# Patient Record
Sex: FEMALE | Race: WHITE | Employment: OTHER | URBAN - METROPOLITAN AREA
[De-identification: names, ages, dates, MRNs, and addresses within clinical notes are randomized per-mention and may not be internally consistent; named-entity substitution may affect disease eponyms.]

---

## 2017-01-24 ENCOUNTER — OP HISTORICAL/CONVERTED ENCOUNTER (OUTPATIENT)
Dept: OTHER | Age: 62
End: 2017-01-24

## 2017-01-31 ENCOUNTER — OP HISTORICAL/CONVERTED ENCOUNTER (OUTPATIENT)
Dept: OTHER | Age: 62
End: 2017-01-31

## 2017-02-22 ENCOUNTER — ED HISTORICAL/CONVERTED ENCOUNTER (OUTPATIENT)
Dept: OTHER | Age: 62
End: 2017-02-22

## 2017-06-26 ENCOUNTER — OP HISTORICAL/CONVERTED ENCOUNTER (OUTPATIENT)
Dept: OTHER | Age: 62
End: 2017-06-26

## 2017-08-18 ENCOUNTER — OP HISTORICAL/CONVERTED ENCOUNTER (OUTPATIENT)
Dept: OTHER | Age: 62
End: 2017-08-18

## 2017-09-14 ENCOUNTER — OP HISTORICAL/CONVERTED ENCOUNTER (OUTPATIENT)
Dept: OTHER | Age: 62
End: 2017-09-14

## 2017-10-04 ENCOUNTER — OP HISTORICAL/CONVERTED ENCOUNTER (OUTPATIENT)
Dept: OTHER | Age: 62
End: 2017-10-04

## 2017-10-20 ENCOUNTER — OP HISTORICAL/CONVERTED ENCOUNTER (OUTPATIENT)
Dept: OTHER | Age: 62
End: 2017-10-20

## 2017-12-15 ENCOUNTER — OP HISTORICAL/CONVERTED ENCOUNTER (OUTPATIENT)
Dept: OTHER | Age: 62
End: 2017-12-15

## 2017-12-21 ENCOUNTER — OP HISTORICAL/CONVERTED ENCOUNTER (OUTPATIENT)
Dept: OTHER | Age: 62
End: 2017-12-21

## 2018-04-12 ENCOUNTER — OP HISTORICAL/CONVERTED ENCOUNTER (OUTPATIENT)
Dept: OTHER | Age: 63
End: 2018-04-12

## 2018-04-23 ENCOUNTER — OP HISTORICAL/CONVERTED ENCOUNTER (OUTPATIENT)
Dept: OTHER | Age: 63
End: 2018-04-23

## 2018-08-12 ENCOUNTER — ED HISTORICAL/CONVERTED ENCOUNTER (OUTPATIENT)
Dept: OTHER | Age: 63
End: 2018-08-12

## 2018-11-01 ENCOUNTER — OP HISTORICAL/CONVERTED ENCOUNTER (OUTPATIENT)
Dept: OTHER | Age: 63
End: 2018-11-01

## 2018-11-12 ENCOUNTER — IP HISTORICAL/CONVERTED ENCOUNTER (OUTPATIENT)
Dept: OTHER | Age: 63
End: 2018-11-12

## 2018-12-11 ENCOUNTER — ED HISTORICAL/CONVERTED ENCOUNTER (OUTPATIENT)
Dept: OTHER | Age: 63
End: 2018-12-11

## 2018-12-14 ENCOUNTER — ED HISTORICAL/CONVERTED ENCOUNTER (OUTPATIENT)
Dept: OTHER | Age: 63
End: 2018-12-14

## 2019-02-14 ENCOUNTER — OP HISTORICAL/CONVERTED ENCOUNTER (OUTPATIENT)
Dept: OTHER | Age: 64
End: 2019-02-14

## 2019-04-24 ENCOUNTER — OP HISTORICAL/CONVERTED ENCOUNTER (OUTPATIENT)
Dept: OTHER | Age: 64
End: 2019-04-24

## 2019-05-02 ENCOUNTER — OP HISTORICAL/CONVERTED ENCOUNTER (OUTPATIENT)
Dept: OTHER | Age: 64
End: 2019-05-02

## 2019-05-06 ENCOUNTER — OP HISTORICAL/CONVERTED ENCOUNTER (OUTPATIENT)
Dept: OTHER | Age: 64
End: 2019-05-06

## 2019-05-14 ENCOUNTER — OP HISTORICAL/CONVERTED ENCOUNTER (OUTPATIENT)
Dept: OTHER | Age: 64
End: 2019-05-14

## 2019-05-19 ENCOUNTER — OP HISTORICAL/CONVERTED ENCOUNTER (OUTPATIENT)
Dept: OTHER | Age: 64
End: 2019-05-19

## 2019-10-23 ENCOUNTER — OP HISTORICAL/CONVERTED ENCOUNTER (OUTPATIENT)
Dept: OTHER | Age: 64
End: 2019-10-23

## 2019-11-15 ENCOUNTER — OP HISTORICAL/CONVERTED ENCOUNTER (OUTPATIENT)
Dept: OTHER | Age: 64
End: 2019-11-15

## 2020-05-28 ENCOUNTER — OP HISTORICAL/CONVERTED ENCOUNTER (OUTPATIENT)
Dept: OTHER | Age: 65
End: 2020-05-28

## 2020-10-19 ENCOUNTER — HOSPITAL ENCOUNTER (OUTPATIENT)
Dept: NON INVASIVE DIAGNOSTICS | Age: 65
Discharge: HOME | End: 2020-10-19
Attending: INTERNAL MEDICINE
Payer: MEDICARE

## 2020-10-19 ENCOUNTER — TRANSCRIBE ORDER (OUTPATIENT)
Dept: SCHEDULING | Age: 65
End: 2020-10-19

## 2020-10-19 DIAGNOSIS — M79.605 LEFT LEG PAIN: Primary | ICD-10-CM

## 2020-10-19 DIAGNOSIS — M79.605 LEFT LEG PAIN: ICD-10-CM

## 2020-10-19 DIAGNOSIS — M79.604 BILATERAL LEG PAIN: ICD-10-CM

## 2020-10-19 DIAGNOSIS — M79.605 BILATERAL LEG PAIN: ICD-10-CM

## 2020-10-19 PROCEDURE — 93970 EXTREMITY STUDY: CPT

## 2020-10-21 ENCOUNTER — TRANSCRIBE ORDER (OUTPATIENT)
Dept: SCHEDULING | Age: 65
End: 2020-10-21

## 2020-10-21 DIAGNOSIS — R06.02 SHORTNESS OF BREATH: Primary | ICD-10-CM

## 2020-10-21 DIAGNOSIS — R53.83 FATIGUE: ICD-10-CM

## 2020-10-21 DIAGNOSIS — D05.11 INTRADUCTAL CARCINOMA IN SITU OF RIGHT BREAST: ICD-10-CM

## 2020-10-29 ENCOUNTER — HOSPITAL ENCOUNTER (OUTPATIENT)
Dept: CT IMAGING | Age: 65
Discharge: HOME | End: 2020-10-29
Attending: INTERNAL MEDICINE
Payer: MEDICARE

## 2020-10-29 DIAGNOSIS — R53.83 FATIGUE: ICD-10-CM

## 2020-10-29 DIAGNOSIS — D05.11 INTRADUCTAL CARCINOMA IN SITU OF RIGHT BREAST: ICD-10-CM

## 2020-10-29 DIAGNOSIS — R06.02 SHORTNESS OF BREATH: ICD-10-CM

## 2020-10-29 PROCEDURE — 74011000636 HC RX REV CODE- 636: Performed by: INTERNAL MEDICINE

## 2020-10-29 PROCEDURE — 71260 CT THORAX DX C+: CPT

## 2020-10-29 RX ADMIN — IOPAMIDOL 100 ML: 755 INJECTION, SOLUTION INTRAVENOUS at 11:45

## 2021-01-06 ENCOUNTER — TRANSCRIBE ORDER (OUTPATIENT)
Dept: SCHEDULING | Age: 66
End: 2021-01-06

## 2021-01-06 DIAGNOSIS — M79.89 LEFT LEG SWELLING: Primary | ICD-10-CM

## 2021-01-08 ENCOUNTER — HOSPITAL ENCOUNTER (OUTPATIENT)
Dept: NON INVASIVE DIAGNOSTICS | Age: 66
Discharge: HOME | End: 2021-01-08
Attending: FAMILY MEDICINE
Payer: MEDICARE

## 2021-01-08 DIAGNOSIS — M79.89 LEFT LEG SWELLING: ICD-10-CM

## 2021-01-08 PROCEDURE — 93971 EXTREMITY STUDY: CPT

## 2021-01-19 ENCOUNTER — HOSPITAL ENCOUNTER (OUTPATIENT)
Dept: GENERAL RADIOLOGY | Age: 66
Discharge: HOME | End: 2021-01-19
Payer: MEDICARE

## 2021-01-19 ENCOUNTER — TRANSCRIBE ORDER (OUTPATIENT)
Dept: REGISTRATION | Age: 66
End: 2021-01-19

## 2021-01-19 DIAGNOSIS — M25.579 ANKLE PAIN: ICD-10-CM

## 2021-01-19 DIAGNOSIS — M79.673 FOOT PAIN: Primary | ICD-10-CM

## 2021-01-19 DIAGNOSIS — M79.673 FOOT PAIN: ICD-10-CM

## 2021-01-19 PROCEDURE — 73610 X-RAY EXAM OF ANKLE: CPT

## 2021-01-19 PROCEDURE — 73630 X-RAY EXAM OF FOOT: CPT

## 2021-03-03 ENCOUNTER — OFFICE VISIT (OUTPATIENT)
Dept: PRIMARY CARE CLINIC | Age: 66
End: 2021-03-03
Payer: MEDICARE

## 2021-03-03 VITALS
SYSTOLIC BLOOD PRESSURE: 118 MMHG | DIASTOLIC BLOOD PRESSURE: 72 MMHG | RESPIRATION RATE: 20 BRPM | HEART RATE: 65 BPM | TEMPERATURE: 97.8 F | OXYGEN SATURATION: 96 %

## 2021-03-03 DIAGNOSIS — R68.83 CHILLS: ICD-10-CM

## 2021-03-03 DIAGNOSIS — Z13.83 SCREENING FOR CARDIOVASCULAR, RESPIRATORY, AND GENITOURINARY DISEASES: Primary | ICD-10-CM

## 2021-03-03 DIAGNOSIS — Z13.89 SCREENING FOR CARDIOVASCULAR, RESPIRATORY, AND GENITOURINARY DISEASES: Primary | ICD-10-CM

## 2021-03-03 DIAGNOSIS — J34.89 FRONTAL SINUS PAIN: ICD-10-CM

## 2021-03-03 DIAGNOSIS — R53.83 FATIGUE, UNSPECIFIED TYPE: ICD-10-CM

## 2021-03-03 DIAGNOSIS — R52 GENERALIZED BODY ACHES: ICD-10-CM

## 2021-03-03 DIAGNOSIS — R11.0 NAUSEA: ICD-10-CM

## 2021-03-03 DIAGNOSIS — R19.7 DIARRHEA, UNSPECIFIED TYPE: ICD-10-CM

## 2021-03-03 DIAGNOSIS — Z13.6 SCREENING FOR CARDIOVASCULAR, RESPIRATORY, AND GENITOURINARY DISEASES: Primary | ICD-10-CM

## 2021-03-03 PROCEDURE — 99213 OFFICE O/P EST LOW 20 MIN: CPT | Performed by: NURSE PRACTITIONER

## 2021-03-03 RX ORDER — FLUTICASONE PROPIONATE 50 MCG
SPRAY, SUSPENSION (ML) NASAL
Qty: 1 BOTTLE | Refills: 1 | Status: SHIPPED | OUTPATIENT
Start: 2021-03-03 | End: 2021-09-22

## 2021-03-03 RX ORDER — ONDANSETRON 8 MG/1
8 TABLET, ORALLY DISINTEGRATING ORAL
Qty: 21 TAB | Refills: 0 | Status: ON HOLD | OUTPATIENT
Start: 2021-03-03 | End: 2021-09-19

## 2021-03-03 RX ORDER — GUAIFENESIN 600 MG/1
600 TABLET, EXTENDED RELEASE ORAL 2 TIMES DAILY
Qty: 30 TAB | Refills: 0 | Status: SHIPPED | OUTPATIENT
Start: 2021-03-03 | End: 2022-08-31

## 2021-03-03 RX ORDER — SOD CHLOR,BICARB/SQUEEZ BOTTLE
1 PACKET, WITH RINSE DEVICE NASAL 2 TIMES DAILY
Qty: 1 EACH | Refills: 1 | Status: ON HOLD | OUTPATIENT
Start: 2021-03-03 | End: 2021-09-19

## 2021-03-03 RX ORDER — SODIUM SULFIDE 10 MG/G
15 GEL TOPICAL
Qty: 240 ML | Refills: 0 | Status: ON HOLD | OUTPATIENT
Start: 2021-03-03 | End: 2021-09-19

## 2021-03-03 NOTE — PROGRESS NOTES
Chief Complaint   Patient presents with    Chills     Patient sent by PCP's office, has c/o symptoms x 4 days.      Generalized Body Aches    Diarrhea    Headache    Fatigue    Nausea     Visit Vitals  /72 (BP 1 Location: Left upper arm, BP Patient Position: Sitting, BP Cuff Size: Adult)   Pulse 65   Temp 97.8 °F (36.6 °C) (Skin)   Resp 20   SpO2 96% Comment: 2L oxygen

## 2021-03-03 NOTE — PROGRESS NOTES
Radha Campa is a 63 y.o. female who was seen in clinic today (3/3/2021) for an acute visit. Assessment/Plan:            * COVID-19 sample collected and submitted       * Patient given detailed CDC instructions contained within After Visit Summary    Diagnoses and all orders for this visit:    1. Screening for cardiovascular, respiratory, and genitourinary diseases  -     NOVEL CORONAVIRUS (COVID-19)    2. Frontal sinus pain  -     guaiFENesin ER (MUCINEX) 600 mg ER tablet; Take 1 Tab by mouth two (2) times a day. Indications: cold symptoms, cough  -     sod chlor-bicarb-squeez bottle (Neilmed Sinus Rinse Complete) pkdv; 1 Kit by sinus irrigation route two (2) times a day. Indications: stuffy nose  -     fluticasone propionate (FLONASE) 50 mcg/actuation nasal spray; 1-2 sprays each nostril daily; place tip of nozzle in nose, aim towards same side sinus/eye, sniff gently and depress quickly, do not swallow. Indications: inflammation of the nose due to an allergy    3. Chills  -     NOVEL CORONAVIRUS (COVID-19)    4. Generalized body aches  -     NOVEL CORONAVIRUS (COVID-19)    5. Fatigue, unspecified type  -     NOVEL CORONAVIRUS (COVID-19)  -     doxylamine-DM-acetaminophen (Coricidin HBP Cold-Multi Sympt) 6.25- mg/15 mL liqd; Take 15 mL by mouth every six (6) hours as needed for Pain, Fever or Cough. Indications: cold symptoms, cough, flu-like symptoms    6. Nausea  -     NOVEL CORONAVIRUS (COVID-19)  -     ondansetron (ZOFRAN ODT) 8 mg disintegrating tablet; Take 1 Tab by mouth every eight (8) hours as needed for Nausea or Vomiting. 7. Diarrhea, unspecified type  -     NOVEL CORONAVIRUS (COVID-19)       Covid like s/s with no known covid exposure. Discussed expected course/resolution/complications of diagnosis in detail with patient. Advised pt on CDC guidance, OTC medications for symptom management, red flags reviewed and should develop to seek emergency medical attn.    Reviewed with her that COVID-19 pandemic is an evolving situation with rapidly changing recommendations & guidelines, continue to practice hand hygiene, social distancing, wearing of facial coverings. Regardless of testing results, should still follow CDC guidelines as there is a chance of a false negative, or symptoms may be due to a cold or flu which are also transmissible. Medical decisions are made based on the the best information available at the time. Recommended she stay tuned for updates published by trusted sources and to advise your PCP of any unexpected changes in clinical condition     Subjective:   Nelson Pope was seen today for Chills (Patient sent by PCP's office, has c/o symptoms x 4 days. ), Generalized Body Aches, Diarrhea, Headache, Fatigue, and Nausea  Symptoms are not worsening since onset. She has no known covid 19 exposure. Has a hx of COPD and uses 2L of 02 via nasal cannula, symptoms are at baseline, she denies increased sputum production/increased sputum thickness. Hasn't attempted to treat symptoms with any OTC med. She denies a recent history/current: loss of smell/taste, cough, fever, wheezing, SOB, and HERNANDEZ. Travel Screening     Question   Response    In the last month, have you been in contact with someone who was confirmed or suspected to have Coronavirus / COVID-19? No / Unsure    Have you had a COVID-19 viral test in the last 14 days? No    Do you have any of the following new or worsening symptoms? Chills;Diarrhea;Muscle pain; Fatigue    Have you traveled internationally in the last month? No      Travel History   Travel since 02/03/21     No documented travel since 02/03/21          ROS - Pertinent items are noted in HPI    There is no problem list on file for this patient. Home Medications    Medication Sig Start Date End Date Taking? Authorizing Provider   rivaroxaban (XARELTO PO) Take  by mouth.    Yes Provider, Historical   doxylamine-DM-acetaminophen (Coricidin HBP Cold-Multi Sympt) 6.25- mg/15 mL liqd Take 15 mL by mouth every six (6) hours as needed for Pain, Fever or Cough. Indications: cold symptoms, cough, flu-like symptoms 3/3/21  Yes Lashell Minor, NP   guaiFENesin ER (MUCINEX) 600 mg ER tablet Take 1 Tab by mouth two (2) times a day. Indications: cold symptoms, cough 3/3/21  Yes Lashell Minor, NP   ondansetron (ZOFRAN ODT) 8 mg disintegrating tablet Take 1 Tab by mouth every eight (8) hours as needed for Nausea or Vomiting. 3/3/21  Yes Lashell Minor, NP   sod chlor-bicarb-squeez bottle (Neilmed Sinus Rinse Complete) pkdv 1 Kit by sinus irrigation route two (2) times a day. Indications: stuffy nose 3/3/21  Yes Lashell Minor, NP   fluticasone propionate (FLONASE) 50 mcg/actuation nasal spray 1-2 sprays each nostril daily; place tip of nozzle in nose, aim towards same side sinus/eye, sniff gently and depress quickly, do not swallow. Indications: inflammation of the nose due to an allergy 3/3/21  Yes Lashell Minor, NP      No Known Allergies       Objective:   Physical Exam  General:  alert, cooperative, no distress, wearing nasal cannula under procedural face mask   Ears: Normal external ear canals AU   Sinuses: Frontal and maxillary TTP   Mouth:  Lips, mucosa, and tongue normal. Teeth and gums normal: oropharynx: clear   Neck: supple, symmetrical, trachea midline. Heart: normal rate, regular rhythm, normal S1, S2, no murmurs, rubs, clicks or gallops. Lungs: clear to auscultation bilaterally       Visit Vitals  /72 (BP 1 Location: Left upper arm, BP Patient Position: Sitting, BP Cuff Size: Adult)   Pulse 65   Temp 97.8 °F (36.6 °C) (Skin)   Resp 20   SpO2 96% Comment: 2L oxygen         Disclaimer:        Medication risks/benefits/costs/interactions/alternatives discussed with patient. She was given an after visit summary which includes diagnoses, current medications, & vitals. She expressed understanding with the diagnosis and plan.       Aspects of this note may have been generated using voice recognition software. Despite editing, there may be some syntax errors.        Verito Baldwin NP

## 2021-03-04 ENCOUNTER — TELEPHONE (OUTPATIENT)
Dept: PRIMARY CARE CLINIC | Age: 66
End: 2021-03-04

## 2021-03-04 LAB — SARS-COV-2, NAA: NOT DETECTED

## 2021-05-20 ENCOUNTER — TRANSCRIBE ORDER (OUTPATIENT)
Dept: SCHEDULING | Age: 66
End: 2021-05-20

## 2021-05-20 DIAGNOSIS — D05.11 INTRADUCTAL CARCINOMA IN SITU OF RIGHT BREAST: Primary | ICD-10-CM

## 2021-06-01 ENCOUNTER — HOSPITAL ENCOUNTER (OUTPATIENT)
Dept: MAMMOGRAPHY | Age: 66
Discharge: HOME | End: 2021-06-01
Attending: INTERNAL MEDICINE
Payer: MEDICARE

## 2021-06-01 DIAGNOSIS — D05.11 INTRADUCTAL CARCINOMA IN SITU OF RIGHT BREAST: ICD-10-CM

## 2021-06-01 PROCEDURE — 77062 BREAST TOMOSYNTHESIS BI: CPT

## 2021-06-16 ENCOUNTER — HOSPITAL ENCOUNTER (OUTPATIENT)
Dept: GENERAL RADIOLOGY | Age: 66
Discharge: HOME | End: 2021-06-16
Payer: MEDICARE

## 2021-06-16 ENCOUNTER — TRANSCRIBE ORDER (OUTPATIENT)
Dept: REGISTRATION | Age: 66
End: 2021-06-16

## 2021-06-16 DIAGNOSIS — J44.9 COPD (CHRONIC OBSTRUCTIVE PULMONARY DISEASE) (HCC): ICD-10-CM

## 2021-06-16 DIAGNOSIS — J44.9 COPD (CHRONIC OBSTRUCTIVE PULMONARY DISEASE) (HCC): Primary | ICD-10-CM

## 2021-06-16 PROCEDURE — 71046 X-RAY EXAM CHEST 2 VIEWS: CPT

## 2021-09-18 ENCOUNTER — HOSPITAL ENCOUNTER (INPATIENT)
Age: 66
LOS: 2 days | Discharge: HOME | DRG: 813 | End: 2021-09-22
Attending: EMERGENCY MEDICINE | Admitting: HOSPITALIST
Payer: MEDICARE

## 2021-09-18 DIAGNOSIS — R04.0 NOSEBLEED: Primary | ICD-10-CM

## 2021-09-18 DIAGNOSIS — Z79.01 ANTICOAGULATED BY ANTICOAGULATION TREATMENT: ICD-10-CM

## 2021-09-18 LAB
ANION GAP SERPL CALC-SCNC: 12 MMOL/L (ref 5–15)
APTT PPP: 28.2 SEC (ref 22.1–31)
BASOPHILS # BLD: 0.1 K/UL (ref 0–0.1)
BASOPHILS NFR BLD: 1 % (ref 0–1)
BUN SERPL-MCNC: 33 MG/DL (ref 6–20)
BUN/CREAT SERPL: 24 (ref 12–20)
CA-I BLD-MCNC: 9.4 MG/DL (ref 8.5–10.1)
CHLORIDE SERPL-SCNC: 100 MMOL/L (ref 97–108)
CO2 SERPL-SCNC: 28 MMOL/L (ref 21–32)
CREAT SERPL-MCNC: 1.38 MG/DL (ref 0.55–1.02)
DIFFERENTIAL METHOD BLD: NORMAL
EOSINOPHIL # BLD: 0.2 K/UL (ref 0–0.4)
EOSINOPHIL NFR BLD: 2 % (ref 0–7)
ERYTHROCYTE [DISTWIDTH] IN BLOOD BY AUTOMATED COUNT: 14.1 % (ref 11.5–14.5)
GLUCOSE SERPL-MCNC: 105 MG/DL (ref 65–100)
HCT VFR BLD AUTO: 40.6 % (ref 35–47)
HGB BLD-MCNC: 13.3 G/DL (ref 11.5–16)
IMM GRANULOCYTES # BLD AUTO: 0 K/UL (ref 0–0.04)
IMM GRANULOCYTES NFR BLD AUTO: 0 % (ref 0–0.5)
INR PPP: 1.2 (ref 0.9–1.1)
LYMPHOCYTES # BLD: 2.8 K/UL (ref 0.8–3.5)
LYMPHOCYTES NFR BLD: 30 % (ref 12–49)
MCH RBC QN AUTO: 30.9 PG (ref 26–34)
MCHC RBC AUTO-ENTMCNC: 32.8 G/DL (ref 30–36.5)
MCV RBC AUTO: 94.2 FL (ref 80–99)
MONOCYTES # BLD: 0.9 K/UL (ref 0–1)
MONOCYTES NFR BLD: 9 % (ref 5–13)
NEUTS SEG # BLD: 5.3 K/UL (ref 1.8–8)
NEUTS SEG NFR BLD: 58 % (ref 32–75)
PLATELET # BLD AUTO: 169 K/UL (ref 150–400)
PMV BLD AUTO: 12.2 FL (ref 8.9–12.9)
POTASSIUM SERPL-SCNC: 3.7 MMOL/L (ref 3.5–5.1)
PROTHROMBIN TIME: 12.3 SEC (ref 9–11.1)
RBC # BLD AUTO: 4.31 M/UL (ref 3.8–5.2)
SODIUM SERPL-SCNC: 140 MMOL/L (ref 136–145)
THERAPEUTIC RANGE,PTTT: NORMAL SEC (ref 82–109)
WBC # BLD AUTO: 9.1 K/UL (ref 3.6–11)

## 2021-09-18 PROCEDURE — 36415 COLL VENOUS BLD VENIPUNCTURE: CPT

## 2021-09-18 PROCEDURE — 86901 BLOOD TYPING SEROLOGIC RH(D): CPT

## 2021-09-18 PROCEDURE — 80048 BASIC METABOLIC PNL TOTAL CA: CPT

## 2021-09-18 PROCEDURE — 85610 PROTHROMBIN TIME: CPT

## 2021-09-18 PROCEDURE — 85025 COMPLETE CBC W/AUTO DIFF WBC: CPT

## 2021-09-18 PROCEDURE — 85730 THROMBOPLASTIN TIME PARTIAL: CPT

## 2021-09-18 PROCEDURE — 2Y41X5Z PACKING OF NASAL REGION USING PACKING MATERIAL: ICD-10-PCS | Performed by: HOSPITALIST

## 2021-09-18 PROCEDURE — 99284 EMERGENCY DEPT VISIT MOD MDM: CPT

## 2021-09-18 RX ORDER — ATORVASTATIN CALCIUM 40 MG/1
40 TABLET, FILM COATED ORAL DAILY
COMMUNITY
Start: 2021-08-20 | End: 2022-08-31

## 2021-09-18 RX ORDER — ASPIRIN 81 MG/1
81 TABLET ORAL DAILY
COMMUNITY

## 2021-09-18 NOTE — Clinical Note
Patient Class[de-identified] OBSERVATION [104]   Type of Bed: Medical [8]   Cardiac Monitoring Required?: No   Reason for Observation: posterior nose bleed   Admitting Diagnosis: Bleeding from the nose [[id number]]   Admitting Diagnosis: Anticoagulated by anticoagulation treatment [[id number]]   Admitting Physician: Ludwin Griffin [[id number]   Attending Physician: Ludwin Griffin [[id number]]

## 2021-09-19 LAB
ABO + RH BLD: NORMAL
ANION GAP SERPL CALC-SCNC: 4 MMOL/L (ref 5–15)
BASOPHILS # BLD: 0 K/UL (ref 0–0.1)
BASOPHILS NFR BLD: 1 % (ref 0–1)
BLOOD GROUP ANTIBODIES SERPL: NEGATIVE
BUN SERPL-MCNC: 30 MG/DL (ref 6–20)
BUN/CREAT SERPL: 35 (ref 12–20)
CA-I BLD-MCNC: 9.1 MG/DL (ref 8.5–10.1)
CHLORIDE SERPL-SCNC: 105 MMOL/L (ref 97–108)
CO2 SERPL-SCNC: 32 MMOL/L (ref 21–32)
CREAT SERPL-MCNC: 0.85 MG/DL (ref 0.55–1.02)
DIFFERENTIAL METHOD BLD: ABNORMAL
EOSINOPHIL # BLD: 0.1 K/UL (ref 0–0.4)
EOSINOPHIL NFR BLD: 2 % (ref 0–7)
ERYTHROCYTE [DISTWIDTH] IN BLOOD BY AUTOMATED COUNT: 14.2 % (ref 11.5–14.5)
GLUCOSE SERPL-MCNC: 120 MG/DL (ref 65–100)
HCT VFR BLD AUTO: 36.8 % (ref 35–47)
HGB BLD-MCNC: 11.9 G/DL (ref 11.5–16)
IMM GRANULOCYTES # BLD AUTO: 0 K/UL (ref 0–0.04)
IMM GRANULOCYTES NFR BLD AUTO: 0 % (ref 0–0.5)
LYMPHOCYTES # BLD: 1.7 K/UL (ref 0.8–3.5)
LYMPHOCYTES NFR BLD: 27 % (ref 12–49)
MCH RBC QN AUTO: 30.4 PG (ref 26–34)
MCHC RBC AUTO-ENTMCNC: 32.3 G/DL (ref 30–36.5)
MCV RBC AUTO: 94.1 FL (ref 80–99)
MONOCYTES # BLD: 0.7 K/UL (ref 0–1)
MONOCYTES NFR BLD: 11 % (ref 5–13)
NEUTS SEG # BLD: 3.9 K/UL (ref 1.8–8)
NEUTS SEG NFR BLD: 59 % (ref 32–75)
NRBC # BLD: 0 K/UL (ref 0–0.01)
NRBC BLD-RTO: 0 PER 100 WBC
PLATELET # BLD AUTO: 142 K/UL (ref 150–400)
PMV BLD AUTO: 13 FL (ref 8.9–12.9)
POTASSIUM SERPL-SCNC: 3.9 MMOL/L (ref 3.5–5.1)
RBC # BLD AUTO: 3.91 M/UL (ref 3.8–5.2)
SODIUM SERPL-SCNC: 141 MMOL/L (ref 136–145)
SPECIMEN EXP DATE BLD: NORMAL
WBC # BLD AUTO: 6.6 K/UL (ref 3.6–11)

## 2021-09-19 PROCEDURE — 99218 HC RM OBSERVATION: CPT

## 2021-09-19 PROCEDURE — 85025 COMPLETE CBC W/AUTO DIFF WBC: CPT

## 2021-09-19 PROCEDURE — 74011250637 HC RX REV CODE- 250/637: Performed by: HOSPITALIST

## 2021-09-19 PROCEDURE — 74011250637 HC RX REV CODE- 250/637: Performed by: PHYSICIAN ASSISTANT

## 2021-09-19 PROCEDURE — 99222 1ST HOSP IP/OBS MODERATE 55: CPT | Performed by: OTOLARYNGOLOGY

## 2021-09-19 PROCEDURE — 80048 BASIC METABOLIC PNL TOTAL CA: CPT

## 2021-09-19 PROCEDURE — 36415 COLL VENOUS BLD VENIPUNCTURE: CPT

## 2021-09-19 PROCEDURE — 77010033678 HC OXYGEN DAILY

## 2021-09-19 RX ORDER — ESCITALOPRAM OXALATE 10 MG/1
10 TABLET ORAL EVERY EVENING
Status: DISCONTINUED | OUTPATIENT
Start: 2021-09-19 | End: 2021-09-19

## 2021-09-19 RX ORDER — ONDANSETRON 4 MG/1
4 TABLET, ORALLY DISINTEGRATING ORAL
Status: DISCONTINUED | OUTPATIENT
Start: 2021-09-19 | End: 2021-09-22 | Stop reason: HOSPADM

## 2021-09-19 RX ORDER — LEVOTHYROXINE SODIUM 100 UG/1
75 TABLET ORAL
COMMUNITY
End: 2021-09-22

## 2021-09-19 RX ORDER — POTASSIUM CHLORIDE 750 MG/1
10 TABLET, FILM COATED, EXTENDED RELEASE ORAL DAILY
COMMUNITY

## 2021-09-19 RX ORDER — FLUTICASONE PROPIONATE 50 MCG
2 SPRAY, SUSPENSION (ML) NASAL DAILY
Status: DISCONTINUED | OUTPATIENT
Start: 2021-09-19 | End: 2021-09-19

## 2021-09-19 RX ORDER — LEVOTHYROXINE SODIUM 75 UG/1
75 TABLET ORAL
Status: DISCONTINUED | OUTPATIENT
Start: 2021-09-20 | End: 2021-09-19

## 2021-09-19 RX ORDER — ESCITALOPRAM OXALATE 10 MG/1
10 TABLET ORAL DAILY
Status: ON HOLD | COMMUNITY
End: 2021-09-19

## 2021-09-19 RX ORDER — MONTELUKAST SODIUM 10 MG/1
10 TABLET ORAL DAILY
Status: DISCONTINUED | OUTPATIENT
Start: 2021-09-19 | End: 2021-09-22 | Stop reason: HOSPADM

## 2021-09-19 RX ORDER — PRAMIPEXOLE DIHYDROCHLORIDE 0.12 MG/1
0.5 TABLET ORAL
Status: DISCONTINUED | OUTPATIENT
Start: 2021-09-19 | End: 2021-09-19

## 2021-09-19 RX ORDER — PRAMIPEXOLE DIHYDROCHLORIDE 0.25 MG/1
0.25 TABLET ORAL
COMMUNITY

## 2021-09-19 RX ORDER — ACETAMINOPHEN 650 MG/1
650 SUPPOSITORY RECTAL
Status: DISCONTINUED | OUTPATIENT
Start: 2021-09-19 | End: 2021-09-22 | Stop reason: HOSPADM

## 2021-09-19 RX ORDER — TRAZODONE HYDROCHLORIDE 50 MG/1
50 TABLET ORAL
COMMUNITY

## 2021-09-19 RX ORDER — SODIUM CHLORIDE 0.9 % (FLUSH) 0.9 %
5-40 SYRINGE (ML) INJECTION EVERY 8 HOURS
Status: DISCONTINUED | OUTPATIENT
Start: 2021-09-19 | End: 2021-09-19

## 2021-09-19 RX ORDER — LANOLIN ALCOHOL/MO/W.PET/CERES
1000 CREAM (GRAM) TOPICAL DAILY
Status: DISCONTINUED | OUTPATIENT
Start: 2021-09-19 | End: 2021-09-22 | Stop reason: HOSPADM

## 2021-09-19 RX ORDER — PANTOPRAZOLE SODIUM 20 MG/1
20 TABLET, DELAYED RELEASE ORAL
Status: DISCONTINUED | OUTPATIENT
Start: 2021-09-19 | End: 2021-09-22 | Stop reason: HOSPADM

## 2021-09-19 RX ORDER — LANOLIN ALCOHOL/MO/W.PET/CERES
5000 CREAM (GRAM) TOPICAL DAILY
COMMUNITY

## 2021-09-19 RX ORDER — SODIUM CHLORIDE 0.9 % (FLUSH) 0.9 %
5-40 SYRINGE (ML) INJECTION AS NEEDED
Status: DISCONTINUED | OUTPATIENT
Start: 2021-09-19 | End: 2021-09-19

## 2021-09-19 RX ORDER — ATORVASTATIN CALCIUM 40 MG/1
40 TABLET, FILM COATED ORAL DAILY
Status: DISCONTINUED | OUTPATIENT
Start: 2021-09-19 | End: 2021-09-22 | Stop reason: HOSPADM

## 2021-09-19 RX ORDER — PRAMIPEXOLE DIHYDROCHLORIDE 0.12 MG/1
0.5 TABLET ORAL
Status: DISCONTINUED | OUTPATIENT
Start: 2021-09-19 | End: 2021-09-22 | Stop reason: HOSPADM

## 2021-09-19 RX ORDER — POTASSIUM CHLORIDE 750 MG/1
10 TABLET, FILM COATED, EXTENDED RELEASE ORAL DAILY
Status: DISCONTINUED | OUTPATIENT
Start: 2021-09-19 | End: 2021-09-22 | Stop reason: HOSPADM

## 2021-09-19 RX ORDER — MELOXICAM 15 MG/1
15 TABLET ORAL DAILY
COMMUNITY
End: 2021-09-22

## 2021-09-19 RX ORDER — MINERAL OIL
180 ENEMA (ML) RECTAL DAILY
COMMUNITY

## 2021-09-19 RX ORDER — ALBUTEROL SULFATE 90 UG/1
1 AEROSOL, METERED RESPIRATORY (INHALATION)
Status: DISCONTINUED | OUTPATIENT
Start: 2021-09-19 | End: 2021-09-22 | Stop reason: HOSPADM

## 2021-09-19 RX ORDER — OXYCODONE HYDROCHLORIDE 5 MG/1
5 TABLET ORAL
Status: DISCONTINUED | OUTPATIENT
Start: 2021-09-19 | End: 2021-09-22 | Stop reason: HOSPADM

## 2021-09-19 RX ORDER — ALBUTEROL SULFATE 90 UG/1
2 AEROSOL, METERED RESPIRATORY (INHALATION)
COMMUNITY

## 2021-09-19 RX ORDER — FUROSEMIDE 40 MG/1
80 TABLET ORAL DAILY
Status: DISCONTINUED | OUTPATIENT
Start: 2021-09-19 | End: 2021-09-22 | Stop reason: HOSPADM

## 2021-09-19 RX ORDER — MONTELUKAST SODIUM 10 MG/1
10 TABLET ORAL DAILY
COMMUNITY

## 2021-09-19 RX ORDER — PHENOL/SODIUM PHENOLATE
20 AEROSOL, SPRAY (ML) MUCOUS MEMBRANE DAILY
COMMUNITY

## 2021-09-19 RX ORDER — LEVOTHYROXINE SODIUM 75 UG/1
75 TABLET ORAL
Status: DISCONTINUED | OUTPATIENT
Start: 2021-09-19 | End: 2021-09-22 | Stop reason: HOSPADM

## 2021-09-19 RX ORDER — PAROXETINE 30 MG/1
30 TABLET, FILM COATED ORAL
COMMUNITY
End: 2022-08-31

## 2021-09-19 RX ORDER — AZITHROMYCIN 500 MG/1
500 TABLET, FILM COATED ORAL DAILY
Status: DISCONTINUED | OUTPATIENT
Start: 2021-09-19 | End: 2021-09-22 | Stop reason: HOSPADM

## 2021-09-19 RX ORDER — TRAZODONE HYDROCHLORIDE 50 MG/1
50 TABLET ORAL
Status: DISCONTINUED | OUTPATIENT
Start: 2021-09-19 | End: 2021-09-22 | Stop reason: HOSPADM

## 2021-09-19 RX ORDER — MELATONIN
1000 DAILY
COMMUNITY

## 2021-09-19 RX ORDER — TRAZODONE HYDROCHLORIDE 50 MG/1
50 TABLET ORAL
Status: DISCONTINUED | OUTPATIENT
Start: 2021-09-19 | End: 2021-09-19

## 2021-09-19 RX ORDER — ONDANSETRON 2 MG/ML
4 INJECTION INTRAMUSCULAR; INTRAVENOUS
Status: DISCONTINUED | OUTPATIENT
Start: 2021-09-19 | End: 2021-09-22 | Stop reason: HOSPADM

## 2021-09-19 RX ORDER — MELATONIN
1000 DAILY
Status: DISCONTINUED | OUTPATIENT
Start: 2021-09-19 | End: 2021-09-22 | Stop reason: HOSPADM

## 2021-09-19 RX ORDER — GUAIFENESIN 600 MG/1
600 TABLET, EXTENDED RELEASE ORAL 2 TIMES DAILY
Status: DISCONTINUED | OUTPATIENT
Start: 2021-09-19 | End: 2021-09-22 | Stop reason: HOSPADM

## 2021-09-19 RX ORDER — POLYETHYLENE GLYCOL 3350 17 G/17G
17 POWDER, FOR SOLUTION ORAL DAILY PRN
Status: DISCONTINUED | OUTPATIENT
Start: 2021-09-19 | End: 2021-09-22 | Stop reason: HOSPADM

## 2021-09-19 RX ORDER — ACETAMINOPHEN 325 MG/1
650 TABLET ORAL
Status: DISCONTINUED | OUTPATIENT
Start: 2021-09-19 | End: 2021-09-22 | Stop reason: HOSPADM

## 2021-09-19 RX ORDER — FUROSEMIDE 80 MG/1
80 TABLET ORAL DAILY
COMMUNITY
End: 2022-09-05

## 2021-09-19 RX ORDER — ONDANSETRON 4 MG/1
8 TABLET, ORALLY DISINTEGRATING ORAL
Status: DISCONTINUED | OUTPATIENT
Start: 2021-09-19 | End: 2021-09-19

## 2021-09-19 RX ADMIN — PRAMIPEXOLE DIHYDROCHLORIDE 0.5 MG: 0.12 TABLET ORAL at 22:59

## 2021-09-19 RX ADMIN — Medication 5 ML: at 04:23

## 2021-09-19 RX ADMIN — ACETAMINOPHEN 650 MG: 325 TABLET ORAL at 04:27

## 2021-09-19 RX ADMIN — FUROSEMIDE 80 MG: 40 TABLET ORAL at 12:19

## 2021-09-19 RX ADMIN — MONTELUKAST 10 MG: 10 TABLET, FILM COATED ORAL at 12:19

## 2021-09-19 RX ADMIN — OXYCODONE 5 MG: 5 TABLET ORAL at 16:26

## 2021-09-19 RX ADMIN — TRAZODONE HYDROCHLORIDE 50 MG: 50 TABLET ORAL at 22:00

## 2021-09-19 RX ADMIN — LEVOTHYROXINE SODIUM 75 MCG: 0.07 TABLET ORAL at 09:15

## 2021-09-19 RX ADMIN — PRAMIPEXOLE DIHYDROCHLORIDE 0.5 MG: 0.12 TABLET ORAL at 04:20

## 2021-09-19 RX ADMIN — GUAIFENESIN 600 MG: 600 TABLET, EXTENDED RELEASE ORAL at 22:55

## 2021-09-19 RX ADMIN — GUAIFENESIN 600 MG: 600 TABLET, EXTENDED RELEASE ORAL at 09:15

## 2021-09-19 RX ADMIN — ATORVASTATIN CALCIUM 40 MG: 40 TABLET, FILM COATED ORAL at 09:15

## 2021-09-19 RX ADMIN — PANTOPRAZOLE SODIUM 20 MG: 20 TABLET, DELAYED RELEASE ORAL at 09:15

## 2021-09-19 RX ADMIN — CYANOCOBALAMIN TAB 1000 MCG 1000 MCG: 1000 TAB at 12:19

## 2021-09-19 RX ADMIN — OXYCODONE 5 MG: 5 TABLET ORAL at 22:55

## 2021-09-19 RX ADMIN — PAROXETINE HYDROCHLORIDE 30 MG: 20 TABLET, FILM COATED ORAL at 04:21

## 2021-09-19 RX ADMIN — POTASSIUM CHLORIDE 10 MEQ: 750 TABLET, FILM COATED, EXTENDED RELEASE ORAL at 12:19

## 2021-09-19 RX ADMIN — AZITHROMYCIN MONOHYDRATE 500 MG: 500 TABLET ORAL at 12:19

## 2021-09-19 RX ADMIN — OXYCODONE 5 MG: 5 TABLET ORAL at 12:19

## 2021-09-19 RX ADMIN — PAROXETINE HYDROCHLORIDE 30 MG: 20 TABLET, FILM COATED ORAL at 22:54

## 2021-09-19 RX ADMIN — TRAZODONE HYDROCHLORIDE 50 MG: 50 TABLET ORAL at 04:22

## 2021-09-19 RX ADMIN — Medication 1000 UNITS: at 12:19

## 2021-09-19 NOTE — ED PROVIDER NOTES
EMERGENCY DEPARTMENT HISTORY AND PHYSICAL EXAM      Date: 9/18/2021  Patient Name: Faisal Cheung    History of Presenting Illness     Chief Complaint   Patient presents with    Epistaxis       History Provided By: Patient and daughter    HPI: Faisal Cheung, 63 y.o. female   presents to the ED with cc of nosebleed. Patient who is currently on aspirin and Xarelto came to emergency room complaining of nosebleed from left nare for 2 hours. The bleeding has been episodic without obvious alleviating or aggravating factors. No injury. Patient denies vomiting blood, bright red blood per rectum, hematuria, or easy bruisability. PCP: Dionne Auguste MD    No current facility-administered medications on file prior to encounter. Current Outpatient Medications on File Prior to Encounter   Medication Sig Dispense Refill    furosemide (Lasix) 80 mg tablet Take 80 mg by mouth daily.  escitalopram oxalate (Lexapro) 10 mg tablet Take 10 mg by mouth daily.  PARoxetine (PaxiL) 30 mg tablet Take 30 mg by mouth nightly.  pramipexole (Mirapex) 0.5 mg tablet Take 0.5 mg by mouth nightly.  traZODone (DESYREL) 50 mg tablet Take 50 mg by mouth nightly.  atorvastatin (LIPITOR) 40 mg tablet Take 40 mg by mouth daily.  aspirin delayed-release 81 mg tablet Take 81 mg by mouth daily.  rivaroxaban (XARELTO PO) Take  by mouth.  doxylamine-DM-acetaminophen (Coricidin HBP Cold-Multi Sympt) 6.25- mg/15 mL liqd Take 15 mL by mouth every six (6) hours as needed for Pain, Fever or Cough. Indications: cold symptoms, cough, flu-like symptoms (Patient not taking: Reported on 9/19/2021) 240 mL 0    guaiFENesin ER (MUCINEX) 600 mg ER tablet Take 1 Tab by mouth two (2) times a day.  Indications: cold symptoms, cough (Patient not taking: Reported on 9/19/2021) 30 Tab 0    ondansetron (ZOFRAN ODT) 8 mg disintegrating tablet Take 1 Tab by mouth every eight (8) hours as needed for Nausea or Vomiting. (Patient not taking: Reported on 2021) 21 Tab 0    sod chlor-bicarb-squeez bottle (Neilmed Sinus Rinse Complete) pkdv 1 Kit by sinus irrigation route two (2) times a day. Indications: stuffy nose (Patient not taking: Reported on 2021) 1 Each 1    fluticasone propionate (FLONASE) 50 mcg/actuation nasal spray 1-2 sprays each nostril daily; place tip of nozzle in nose, aim towards same side sinus/eye, sniff gently and depress quickly, do not swallow. Indications: inflammation of the nose due to an allergy (Patient not taking: Reported on 2021) 1 Bottle 1       Past History     Past Medical History:  Past Medical History:   Diagnosis Date    [facility] [address]     Chronic obstructive pulmonary disease [address] [address]     Stroke (cerebrum) [address] [address]        Past Surgical History:  Past Surgical History:   Procedure Laterality Date    HX BREAST LUMPECTOMY Right     HX HEART CATHETERIZATION         Family History:  Family History   Family history unknown: Yes       Social History:  Social History     Tobacco Use    Smoking status: Former Smoker     Packs/day: 1.50     Years: 20.00     Pack years: 30.00     Types: Cigarettes     Quit date: 2016     Years since quittin.7    Smokeless tobacco: Never Used   Substance Use Topics    Alcohol use: Not Currently    Drug use: Never       Allergies:  No Known Allergies      Review of Systems   Review of Systems   Constitutional: Negative for activity change, appetite change, chills and fever. HENT: Positive for nosebleeds. Negative for sore throat. Eyes: Negative for pain. Respiratory: Negative for shortness of breath. Cardiovascular: Negative for chest pain. Gastrointestinal: Negative for nausea. Endocrine: Negative for polyuria. Genitourinary: Negative for hematuria. Skin: Negative for rash. Neurological: Negative for headaches. Hematological: Does not bruise/bleed easily.    Psychiatric/Behavioral: Negative for suicidal ideas. All other systems reviewed and are negative. Physical Exam   Physical Exam  Vitals and nursing note reviewed. Constitutional:       Appearance: Normal appearance. HENT:      Head: Normocephalic and atraumatic. Nose:      Comments: Active bleeding from left nare and left lacrimal orifice. The exact source of bleeding unable to visualize. Mouth/Throat:      Mouth: Mucous membranes are moist.      Pharynx: Oropharynx is clear. Eyes:      Conjunctiva/sclera: Conjunctivae normal.   Cardiovascular:      Rate and Rhythm: Normal rate and regular rhythm. Heart sounds: Normal heart sounds. Pulmonary:      Effort: Pulmonary effort is normal.      Breath sounds: Normal breath sounds. Abdominal:      General: Abdomen is flat. Bowel sounds are normal.      Palpations: Abdomen is soft. Musculoskeletal:      Cervical back: Neck supple. Right lower leg: No edema. Left lower leg: No edema. Skin:     General: Skin is warm and dry. Neurological:      General: No focal deficit present. Mental Status: She is alert and oriented to person, place, and time. Psychiatric:         Mood and Affect: Mood normal.         Diagnostic Study Results     Labs -     Recent Results (from the past 12 hour(s))   CBC WITH AUTOMATED DIFF    Collection Time: 09/18/21 10:15 PM   Result Value Ref Range    WBC 9.1 3.6 - 11.0 K/uL    RBC 4.31 3.80 - 5.20 M/uL    HGB 13.3 11.5 - 16.0 g/dL    HCT 40.6 35.0 - 47.0 %    MCV 94.2 80.0 - 99.0 FL    MCH 30.9 26.0 - 34.0 PG    MCHC 32.8 30.0 - 36.5 g/dL    RDW 14.1 11.5 - 14.5 %    PLATELET 980 134 - 491 K/uL    MPV 12.2 8.9 - 12.9 FL    NEUTROPHILS 58 32 - 75 %    LYMPHOCYTES 30 12 - 49 %    MONOCYTES 9 5 - 13 %    EOSINOPHILS 2 0 - 7 %    BASOPHILS 1 0 - 1 %    IMMATURE GRANULOCYTES 0 0.0 - 0.5 %    ABS. NEUTROPHILS 5.3 1.8 - 8.0 K/UL    ABS. LYMPHOCYTES 2.8 0.8 - 3.5 K/UL    ABS. MONOCYTES 0.9 0.0 - 1.0 K/UL    ABS. EOSINOPHILS 0.2 0.0 - 0.4 K/UL    ABS. BASOPHILS 0.1 0.0 - 0.1 K/UL    ABS. IMM. GRANS. 0.0 0.00 - 0.04 K/UL    DF AUTOMATED     TYPE & SCREEN    Collection Time: 09/18/21 10:15 PM   Result Value Ref Range    Crossmatch Expiration 09/21/2021,2359     ABO/Rh(D) A Negative     Antibody screen Negative    PROTHROMBIN TIME + INR    Collection Time: 09/18/21 10:15 PM   Result Value Ref Range    Prothrombin time 12.3 (H) 9.0 - 11.1 sec    INR 1.2 (H) 0.9 - 1.1     METABOLIC PANEL, BASIC    Collection Time: 09/18/21 10:15 PM   Result Value Ref Range    Sodium 140 136 - 145 mmol/L    Potassium 3.7 3.5 - 5.1 mmol/L    Chloride 100 97 - 108 mmol/L    CO2 28 21 - 32 mmol/L    Anion gap 12 5 - 15 mmol/L    Glucose 105 (H) 65 - 100 mg/dL    BUN 33 (H) 6 - 20 mg/dL    Creatinine 1.38 (H) 0.55 - 1.02 mg/dL    BUN/Creatinine ratio 24 (H) 12 - 20      GFR est AA 47 (L) >60 ml/min/1.73m2    GFR est non-AA 39 (L) >60 ml/min/1.73m2    Calcium 9.4 8.5 - 10.1 mg/dL   PTT    Collection Time: 09/18/21 10:15 PM   Result Value Ref Range    aPTT 28.2 22.1 - 31.0 sec    aPTT, therapeutic range   82 - 109 sec       Radiologic Studies -   No orders to display     CT Results  (Last 48 hours)    None        CXR Results  (Last 48 hours)    None            Medical Decision Making   I am the first provider for this patient. I reviewed the vital signs, available nursing notes, past medical history, past surgical history, family history and social history. Vital Signs-Reviewed the patient's vital signs. Patient Vitals for the past 12 hrs:   Temp Pulse Resp BP SpO2   09/19/21 0328 97.7 °F (36.5 °C) 67 20 120/67 92 %   09/19/21 0013 -- 78 22 129/67 94 %   09/19/21 0003 98.2 °F (36.8 °C) -- -- -- --   09/18/21 2210 -- 98 22 (!) 142/80 92 %       Records Reviewed:     Provider Notes (Medical Decision Making):       ED Course:   Initial assessment performed.  The patients presenting problems have been discussed, and they are in agreement with the care plan formulated and outlined with them.  I have encouraged them to ask questions as they arise throughout their visit. The bleeding was controlled Rhino Rocket placement with intermittent episode of patient spitting up the blood from the mouth. I discussed with ENT on-call Dr Suyapa Longoria for consult. I discussed with hospitalist for admission for observation and evaluation by Dr. Suyapa Longoria     The patient will be admitted for observation in light of possible posterior bleeding on anticoagulation. PROCEDURES  Nosebleed  4.5 Rhino Rocket was placed. Patient was unable to tolerate the longer ones  Total procedure time 5 minutes    Disposition: Condition improved and stable     PLAN:  1. Current Discharge Medication List        2. Follow-up Information    None       Return to ED if worse     Diagnosis     Clinical Impression:   1. Nosebleed    2. Anticoagulated by anticoagulation treatment        Please note that this dictation was completed with [email or web address], the computer voice recognition software. Quite often unanticipated grammatical, syntax, homophones, and other interpretive errors are inadvertently transcribed by the computer software. Please disregard these errors. Please excuse any errors that have escaped final proofreading. Thank you.

## 2021-09-19 NOTE — H&P
History and Physical    Subjective:   Chief Complaint : nose bleed 2 hours  Source of information : patient     History of present illness:     65F, h/o COPD on 3L NC and allergies, h/o DVT with stroke and breast cancer takes xarelto and aspirin with nosebleed since 2 hours     Episodic, 6oz glass full of blood, with cloths presented to [facility]    FSER: posterior packing, bleeding stopped. She states she has been on xarelto for 30 + years, for DVT which is unknown, in  they tried to stop xarelto and she ended up with a stroke, no residual weakness. She has anxiety with depression and RLS. Past Medical History:   Diagnosis Date    [facility] ([facility] [address]     Chronic obstructive pulmonary disease ([facility] [address]     Stroke (cerebrum) ([facility] [address]      Past Surgical History:   Procedure Laterality Date    HX BREAST LUMPECTOMY Right     HX HEART CATHETERIZATION       Family History   Family history unknown: Yes      Social History     Tobacco Use    Smoking status: Former Smoker     Packs/day: 1.50     Years: 20.00     Pack years: 30.00     Types: Cigarettes     Quit date: 2016     Years since quittin.7    Smokeless tobacco: Never Used   Substance Use Topics    Alcohol use: Not Currently       Prior to Admission medications    Medication Sig Start Date End Date Taking? Authorizing Provider   aspirin delayed-release 81 mg tablet Take 81 mg by mouth daily. Yes Other, MD Rose   atorvastatin (LIPITOR) 40 mg tablet Take 40 mg by mouth daily. 21   Other, MD Rose   rivaroxaban (XARELTO PO) Take  by mouth. Provider, Historical   doxylamine-DM-acetaminophen (Coricidin HBP Cold-Multi Sympt) 6.25- mg/15 mL liqd Take 15 mL by mouth every six (6) hours as needed for Pain, Fever or Cough. Indications: cold symptoms, cough, flu-like symptoms 3/3/21   Brooke Roman NP   guaiFENesin ER (MUCINEX) 600 mg ER tablet Take 1 Tab by mouth two (2) times a day.  Indications: cold symptoms, cough 3/3/21 Aubree Pacheco NP   ondansetron (ZOFRAN ODT) 8 mg disintegrating tablet Take 1 Tab by mouth every eight (8) hours as needed for Nausea or Vomiting. 3/3/21   Aubree Pacheco NP   sod chlor-bicarb-squeez bottle (Neilmed Sinus Rinse Complete) pkdv 1 Kit by sinus irrigation route two (2) times a day. Indications: stuffy nose 3/3/21   Aubree Pacheco NP   fluticasone propionate (FLONASE) 50 mcg/actuation nasal spray 1-2 sprays each nostril daily; place tip of nozzle in nose, aim towards same side sinus/eye, sniff gently and depress quickly, do not swallow. Indications: inflammation of the nose due to an allergy 3/3/21   Aubree Pacheco NP     No Known Allergies          Review of Systems:  Review of Systems   Constitutional: Negative for activity change, appetite change, chills and fever. HENT: Positive for nosebleeds. Negative for sore throat. Eyes: Negative for pain. Respiratory: Negative for shortness of breath. Cardiovascular: Negative for chest pain. Gastrointestinal: Negative for nausea. Endocrine: Negative for polyuria. Genitourinary: Negative for hematuria. Skin: Negative for rash. Neurological: Negative for headaches. Hematological: Does not bruise/bleed easily. Psychiatric/Behavioral: Negative for suicidal ideas. All other systems reviewed and are negative. Vitals:     Visit Vitals  /67 (BP 1 Location: Left upper arm, BP Patient Position: At rest)   Pulse 78   Temp 98.2 °F (36.8 °C)   Resp 22   Ht 5' 7\" (1.702 m)   Wt 112.5 kg (248 lb)   SpO2 94%   BMI 38.84 kg/m²       Physical Exam:   Physical Exam  Vitals and nursing note reviewed. Constitutional:       Appearance: Normal appearance. HENT:      Head: Normocephalic and atraumatic. Nose:      Comments: Active bleeding from left nare and left lacrimal orifice. The exact source of bleeding unable to visualize. Mouth/Throat:      Mouth: Mucous membranes are moist.      Pharynx: Oropharynx is clear.    Eyes: Conjunctiva/sclera: Conjunctivae normal.   Cardiovascular:      Rate and Rhythm: Normal rate and regular rhythm. Heart sounds: Normal heart sounds. Pulmonary:      Effort: Pulmonary effort is normal.      Breath sounds: Normal breath sounds. Abdominal:      General: Abdomen is flat. Bowel sounds are normal.      Palpations: Abdomen is soft. Musculoskeletal:      Cervical back: Neck supple. Right lower leg: No edema. Left lower leg: No edema. Skin:     General: Skin is warm and dry. Neurological:      General: No focal deficit present. Mental Status: She is alert and oriented to person, place, and time. Psychiatric:         Mood and Affect: Mood normal.         Data Review:   Recent Results (from the past 24 hour(s))   CBC WITH AUTOMATED DIFF    Collection Time: 09/18/21 10:15 PM   Result Value Ref Range    WBC 9.1 3.6 - 11.0 K/uL    RBC 4.31 3.80 - 5.20 M/uL    HGB 13.3 11.5 - 16.0 g/dL    HCT 40.6 35.0 - 47.0 %    MCV 94.2 80.0 - 99.0 FL    MCH 30.9 26.0 - 34.0 PG    MCHC 32.8 30.0 - 36.5 g/dL    RDW 14.1 11.5 - 14.5 %    PLATELET 466 210 - 131 K/uL    MPV 12.2 8.9 - 12.9 FL    NEUTROPHILS 58 32 - 75 %    LYMPHOCYTES 30 12 - 49 %    MONOCYTES 9 5 - 13 %    EOSINOPHILS 2 0 - 7 %    BASOPHILS 1 0 - 1 %    IMMATURE GRANULOCYTES 0 0.0 - 0.5 %    ABS. NEUTROPHILS 5.3 1.8 - 8.0 K/UL    ABS. LYMPHOCYTES 2.8 0.8 - 3.5 K/UL    ABS. MONOCYTES 0.9 0.0 - 1.0 K/UL    ABS. EOSINOPHILS 0.2 0.0 - 0.4 K/UL    ABS. BASOPHILS 0.1 0.0 - 0.1 K/UL    ABS. IMM.  GRANS. 0.0 0.00 - 0.04 K/UL    DF AUTOMATED     TYPE & SCREEN    Collection Time: 09/18/21 10:15 PM   Result Value Ref Range    Crossmatch Expiration 09/21/2021,2359     ABO/Rh(D) A Negative     Antibody screen Negative    PROTHROMBIN TIME + INR    Collection Time: 09/18/21 10:15 PM   Result Value Ref Range    Prothrombin time 12.3 (H) 9.0 - 11.1 sec    INR 1.2 (H) 0.9 - 1.1     METABOLIC PANEL, BASIC    Collection Time: 09/18/21 10:15 PM   Result Value Ref Range    Sodium 140 136 - 145 mmol/L    Potassium 3.7 3.5 - 5.1 mmol/L    Chloride 100 97 - 108 mmol/L    CO2 28 21 - 32 mmol/L    Anion gap 12 5 - 15 mmol/L    Glucose 105 (H) 65 - 100 mg/dL    BUN 33 (H) 6 - 20 mg/dL    Creatinine 1.38 (H) 0.55 - 1.02 mg/dL    BUN/Creatinine ratio 24 (H) 12 - 20      GFR est AA 47 (L) >60 ml/min/1.73m2    GFR est non-AA 39 (L) >60 ml/min/1.73m2    Calcium 9.4 8.5 - 10.1 mg/dL   PTT    Collection Time: 09/18/21 10:15 PM   Result Value Ref Range    aPTT 28.2 22.1 - 31.0 sec    aPTT, therapeutic range   82 - 109 sec             Assessment and Plan :     (1) nose bleed: holding xarelto and aspirin. Posterior packing, HH stable, observation, ENT consult     (2) COPD + seasonal allergies: triggered #1. (3) chronic CVA: holding aspirin and xarelto for #1, will resume after 48 hours if bleeding stops. (4) HLD: statin    (5) Anxiety with depression and RLS: pramiprexole, trazodone and escitalopram.     DVT: Scds    DISPO: tomorrow when ok with ENT.  Home     Signed By: Davidson Tejada MD     September 19, 2021

## 2021-09-19 NOTE — PROGRESS NOTES
Problem: Falls - Risk of  Goal: *Absence of Falls  Description: Document Sherry Spare Fall Risk and appropriate interventions in the flowsheet.   Outcome: Progressing Towards Goal  Note: Fall Risk Interventions:            Medication Interventions: Teach patient to arise slowly, Patient to call before getting OOB    Elimination Interventions: Call light in reach, Toileting schedule/hourly rounds              Problem: Patient Education: Go to Patient Education Activity  Goal: Patient/Family Education  Outcome: Progressing Towards Goal

## 2021-09-19 NOTE — PROGRESS NOTES
Hospitalist Progress Note    Subjective:   Daily Progress Note: 9/19/2021 9:44 AM    Left nare pain as well as sinus pressure. No obvious epistaxis. Current Facility-Administered Medications   Medication Dose Route Frequency    atorvastatin (LIPITOR) tablet 40 mg  40 mg Oral DAILY    fluticasone propionate (FLONASE) 50 mcg/actuation nasal spray 2 Spray  2 Spray Both Nostrils DAILY    guaiFENesin ER (MUCINEX) tablet 600 mg  600 mg Oral BID    sodium chloride (NS) flush 5-40 mL  5-40 mL IntraVENous Q8H    sodium chloride (NS) flush 5-40 mL  5-40 mL IntraVENous PRN    acetaminophen (TYLENOL) tablet 650 mg  650 mg Oral Q6H PRN    Or    acetaminophen (TYLENOL) suppository 650 mg  650 mg Rectal Q6H PRN    polyethylene glycol (MIRALAX) packet 17 g  17 g Oral DAILY PRN    ondansetron (ZOFRAN ODT) tablet 4 mg  4 mg Oral Q8H PRN    Or    ondansetron (ZOFRAN) injection 4 mg  4 mg IntraVENous Q6H PRN    escitalopram oxalate (LEXAPRO) tablet 10 mg  10 mg Oral QPM    PARoxetine (PAXIL) tablet 30 mg  30 mg Oral QPM    levothyroxine (SYNTHROID) tablet 75 mcg  75 mcg Oral ACB    traZODone (DESYREL) tablet 50 mg  50 mg Oral QHS    pramipexole (MIRAPEX) tablet 0.5 mg  0.5 mg Oral QHS    pantoprazole (PROTONIX) tablet 20 mg  20 mg Oral ACB        Review of Systems  Review of Systems   Constitutional: Positive for malaise/fatigue. Negative for chills and fever. HENT: Negative. Rhino Rocket in the left nare. No obvious bleeding   Eyes: Negative. Respiratory: Negative for cough and shortness of breath. Cardiovascular: Negative for chest pain and leg swelling. Gastrointestinal: Negative for abdominal pain, nausea and vomiting. Genitourinary: Negative. Musculoskeletal: Negative. Skin: Negative. Neurological: Negative. Psychiatric/Behavioral: Negative.              Objective:     Visit Vitals  BP 91/60 (BP 1 Location: Right upper arm, BP Patient Position: Lying left side)   Pulse 75   Temp 97.8 °F (36.6 °C)   Resp 20   Ht 5' 7\" (1.702 m)   Wt 112.5 kg (248 lb)   SpO2 92%   Breastfeeding No   BMI 38.84 kg/m²    O2 Flow Rate (L/min): 4 l/min O2 Device: Nasal cannula    Temp (24hrs), Av.9 °F (36.6 °C), Min:97.7 °F (36.5 °C), Max:98.2 °F (36.8 °C)      No intake/output data recorded. No intake/output data recorded. Recent Results (from the past 24 hour(s))   CBC WITH AUTOMATED DIFF    Collection Time: 21 10:15 PM   Result Value Ref Range    WBC 9.1 3.6 - 11.0 K/uL    RBC 4.31 3.80 - 5.20 M/uL    HGB 13.3 11.5 - 16.0 g/dL    HCT 40.6 35.0 - 47.0 %    MCV 94.2 80.0 - 99.0 FL    MCH 30.9 26.0 - 34.0 PG    MCHC 32.8 30.0 - 36.5 g/dL    RDW 14.1 11.5 - 14.5 %    PLATELET 844 702 - 870 K/uL    MPV 12.2 8.9 - 12.9 FL    NEUTROPHILS 58 32 - 75 %    LYMPHOCYTES 30 12 - 49 %    MONOCYTES 9 5 - 13 %    EOSINOPHILS 2 0 - 7 %    BASOPHILS 1 0 - 1 %    IMMATURE GRANULOCYTES 0 0.0 - 0.5 %    ABS. NEUTROPHILS 5.3 1.8 - 8.0 K/UL    ABS. LYMPHOCYTES 2.8 0.8 - 3.5 K/UL    ABS. MONOCYTES 0.9 0.0 - 1.0 K/UL    ABS. EOSINOPHILS 0.2 0.0 - 0.4 K/UL    ABS. BASOPHILS 0.1 0.0 - 0.1 K/UL    ABS. IMM.  GRANS. 0.0 0.00 - 0.04 K/UL    DF AUTOMATED     TYPE & SCREEN    Collection Time: 21 10:15 PM   Result Value Ref Range    Crossmatch Expiration 2021,2359     ABO/Rh(D) A Negative     Antibody screen Negative    PROTHROMBIN TIME + INR    Collection Time: 21 10:15 PM   Result Value Ref Range    Prothrombin time 12.3 (H) 9.0 - 11.1 sec    INR 1.2 (H) 0.9 - 1.1     METABOLIC PANEL, BASIC    Collection Time: 21 10:15 PM   Result Value Ref Range    Sodium 140 136 - 145 mmol/L    Potassium 3.7 3.5 - 5.1 mmol/L    Chloride 100 97 - 108 mmol/L    CO2 28 21 - 32 mmol/L    Anion gap 12 5 - 15 mmol/L    Glucose 105 (H) 65 - 100 mg/dL    BUN 33 (H) 6 - 20 mg/dL    Creatinine 1.38 (H) 0.55 - 1.02 mg/dL    BUN/Creatinine ratio 24 (H) 12 - 20      GFR est AA 47 (L) >60 ml/min/1.73m2    GFR est non-AA 39 (L) >60 ml/min/1.73m2    Calcium 9.4 8.5 - 10.1 mg/dL   PTT    Collection Time: 09/18/21 10:15 PM   Result Value Ref Range    aPTT 28.2 22.1 - 31.0 sec    aPTT, therapeutic range   82 - 109 sec        No orders to display        PHYSICAL EXAM:    Physical Exam  Vitals reviewed. Constitutional:       General: She is not in acute distress. Appearance: She is not ill-appearing. HENT:      Head: Normocephalic and atraumatic. Nose:      Comments: Rhino Rocket left nare. No obvious bleeding     Mouth/Throat:      Mouth: Mucous membranes are moist.      Pharynx: Oropharynx is clear. Eyes:      Conjunctiva/sclera: Conjunctivae normal.   Cardiovascular:      Rate and Rhythm: Normal rate and regular rhythm. Heart sounds: Normal heart sounds. Pulmonary:      Effort: Pulmonary effort is normal.      Breath sounds: Normal breath sounds. Abdominal:      General: Abdomen is flat. Bowel sounds are normal.      Palpations: Abdomen is soft. Musculoskeletal:         General: Normal range of motion. Cervical back: Normal range of motion and neck supple. Skin:     General: Skin is warm and dry. Neurological:      General: No focal deficit present. Mental Status: She is alert and oriented to person, place, and time. Mental status is at baseline. Psychiatric:         Mood and Affect: Mood normal.          Data Review    Recent Results (from the past 24 hour(s))   CBC WITH AUTOMATED DIFF    Collection Time: 09/18/21 10:15 PM   Result Value Ref Range    WBC 9.1 3.6 - 11.0 K/uL    RBC 4.31 3.80 - 5.20 M/uL    HGB 13.3 11.5 - 16.0 g/dL    HCT 40.6 35.0 - 47.0 %    MCV 94.2 80.0 - 99.0 FL    MCH 30.9 26.0 - 34.0 PG    MCHC 32.8 30.0 - 36.5 g/dL    RDW 14.1 11.5 - 14.5 %    PLATELET 870 974 - 290 K/uL    MPV 12.2 8.9 - 12.9 FL    NEUTROPHILS 58 32 - 75 %    LYMPHOCYTES 30 12 - 49 %    MONOCYTES 9 5 - 13 %    EOSINOPHILS 2 0 - 7 %    BASOPHILS 1 0 - 1 %    IMMATURE GRANULOCYTES 0 0.0 - 0.5 %    ABS.  NEUTROPHILS 5. 3 1.8 - 8.0 K/UL    ABS. LYMPHOCYTES 2.8 0.8 - 3.5 K/UL    ABS. MONOCYTES 0.9 0.0 - 1.0 K/UL    ABS. EOSINOPHILS 0.2 0.0 - 0.4 K/UL    ABS. BASOPHILS 0.1 0.0 - 0.1 K/UL    ABS. IMM. GRANS. 0.0 0.00 - 0.04 K/UL    DF AUTOMATED     TYPE & SCREEN    Collection Time: 09/18/21 10:15 PM   Result Value Ref Range    Crossmatch Expiration 09/21/2021,2359     ABO/Rh(D) A Negative     Antibody screen Negative    PROTHROMBIN TIME + INR    Collection Time: 09/18/21 10:15 PM   Result Value Ref Range    Prothrombin time 12.3 (H) 9.0 - 11.1 sec    INR 1.2 (H) 0.9 - 1.1     METABOLIC PANEL, BASIC    Collection Time: 09/18/21 10:15 PM   Result Value Ref Range    Sodium 140 136 - 145 mmol/L    Potassium 3.7 3.5 - 5.1 mmol/L    Chloride 100 97 - 108 mmol/L    CO2 28 21 - 32 mmol/L    Anion gap 12 5 - 15 mmol/L    Glucose 105 (H) 65 - 100 mg/dL    BUN 33 (H) 6 - 20 mg/dL    Creatinine 1.38 (H) 0.55 - 1.02 mg/dL    BUN/Creatinine ratio 24 (H) 12 - 20      GFR est AA 47 (L) >60 ml/min/1.73m2    GFR est non-AA 39 (L) >60 ml/min/1.73m2    Calcium 9.4 8.5 - 10.1 mg/dL   PTT    Collection Time: 09/18/21 10:15 PM   Result Value Ref Range    aPTT 28.2 22.1 - 31.0 sec    aPTT, therapeutic range   82 - 109 sec        Assessment/Plan:     Active Problems:    Bleeding from the nose (9/18/2021)      Anticoagulated by anticoagulation treatment (9/18/2021)      Hospital course: This is 72-year-old female admitted on 9/18/2021 with epistaxis from the left nare. 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 Patient was discontinued on her anticoagulation and had a stroke 72 hours later. This reason patient has been on Xarelto or anticoagulation for 30+ years. Unfortunately she developed large volume epistaxis from the left nare. Rhino Rocket was placed with cessation of the bleeding. Anticoagulation and aspirin have been discontinued. ENT consult pending.   Most likely the cause was related to the patient's chronic respiratory failure with hypoxia and requiring 3 L of oxygen via nasal cannula. She does not use humidified circuit. Chest x-ray revealed reticular opacities and peribronchial thickening. Have started azithromycin for sinus prophylaxis as well as possible bronchitis. Impression\plan:    1. Left nare epistaxis  Rhino Rocket with cessation of bleeding  Hemoglobin stable  Xarelto and aspirin discontinued  ENT consultation  Prophylactic azithromycin    2. History of DVT and CVA  Holding Xarelto and aspirin for now    3. Chronic respiratory failure with hypoxia secondary to emphysema  Continue oxygen via nasal cannula, humidified circuit    4. Bronchitis via x-ray  Azithromycin    5. KASSANDRA  BUN/creatinine ratio greater than 20 most likely related to dehydration  Continue gentle hydration    CODE STATUS: Full code  Ulcer prophylaxis: Protonix  DVT prophylaxis: SCDs    Discharge barriers: Cessation of epistaxis, ENT consultation    Care Plan discussed with: Patient/Family    Total time spent with patient: >35 minutes.

## 2021-09-19 NOTE — ROUTINE PROCESS
TRANSFER - OUT REPORT:    Verbal report given to Beatrice ELIZABETH RN(name) on Chandan Davies  being transferred to (unit) for routine progression of care       Report consisted of patients Situation, Background, Assessment and   Recommendations(SBAR). Information from the following report(s) SBAR was reviewed with the receiving nurse. Lines:       Opportunity for questions and clarification was provided.       Patient transported with:   Monitor

## 2021-09-20 PROBLEM — R04.0 EPISTAXIS: Status: ACTIVE | Noted: 2021-09-20

## 2021-09-20 LAB
ALBUMIN SERPL-MCNC: 3.4 G/DL (ref 3.5–5)
ALBUMIN/GLOB SERPL: 0.9 {RATIO} (ref 1.1–2.2)
ALP SERPL-CCNC: 52 U/L (ref 45–117)
ALT SERPL-CCNC: 35 U/L (ref 12–78)
ANION GAP SERPL CALC-SCNC: 1 MMOL/L (ref 5–15)
AST SERPL W P-5'-P-CCNC: 32 U/L (ref 15–37)
BASOPHILS # BLD: 0 K/UL (ref 0–0.1)
BASOPHILS NFR BLD: 1 % (ref 0–1)
BILIRUB SERPL-MCNC: 0.5 MG/DL (ref 0.2–1)
BUN SERPL-MCNC: 20 MG/DL (ref 6–20)
BUN/CREAT SERPL: 27 (ref 12–20)
CA-I BLD-MCNC: 9.4 MG/DL (ref 8.5–10.1)
CHLORIDE SERPL-SCNC: 106 MMOL/L (ref 97–108)
CO2 SERPL-SCNC: 35 MMOL/L (ref 21–32)
CREAT SERPL-MCNC: 0.75 MG/DL (ref 0.55–1.02)
DIFFERENTIAL METHOD BLD: ABNORMAL
EOSINOPHIL # BLD: 0.1 K/UL (ref 0–0.4)
EOSINOPHIL NFR BLD: 2 % (ref 0–7)
ERYTHROCYTE [DISTWIDTH] IN BLOOD BY AUTOMATED COUNT: 14.5 % (ref 11.5–14.5)
GLOBULIN SER CALC-MCNC: 3.8 G/DL (ref 2–4)
GLUCOSE BLD STRIP.AUTO-MCNC: 134 MG/DL (ref 65–117)
GLUCOSE SERPL-MCNC: 124 MG/DL (ref 65–100)
HCT VFR BLD AUTO: 39.4 % (ref 35–47)
HGB BLD-MCNC: 12.1 G/DL (ref 11.5–16)
IMM GRANULOCYTES # BLD AUTO: 0 K/UL (ref 0–0.04)
IMM GRANULOCYTES NFR BLD AUTO: 0 % (ref 0–0.5)
LYMPHOCYTES # BLD: 1.9 K/UL (ref 0.8–3.5)
LYMPHOCYTES NFR BLD: 21 % (ref 12–49)
MCH RBC QN AUTO: 30 PG (ref 26–34)
MCHC RBC AUTO-ENTMCNC: 30.7 G/DL (ref 30–36.5)
MCV RBC AUTO: 97.8 FL (ref 80–99)
MONOCYTES # BLD: 1 K/UL (ref 0–1)
MONOCYTES NFR BLD: 12 % (ref 5–13)
NEUTS SEG # BLD: 5.7 K/UL (ref 1.8–8)
NEUTS SEG NFR BLD: 64 % (ref 32–75)
NRBC # BLD: 0 K/UL (ref 0–0.01)
NRBC BLD-RTO: 0 PER 100 WBC
PERFORMED BY, TECHID: ABNORMAL
PLATELET # BLD AUTO: 140 K/UL (ref 150–400)
PMV BLD AUTO: 12.7 FL (ref 8.9–12.9)
POTASSIUM SERPL-SCNC: 4.3 MMOL/L (ref 3.5–5.1)
PROT SERPL-MCNC: 7.2 G/DL (ref 6.4–8.2)
RBC # BLD AUTO: 4.03 M/UL (ref 3.8–5.2)
SODIUM SERPL-SCNC: 142 MMOL/L (ref 136–145)
WBC # BLD AUTO: 8.8 K/UL (ref 3.6–11)

## 2021-09-20 PROCEDURE — 74011250637 HC RX REV CODE- 250/637: Performed by: HOSPITALIST

## 2021-09-20 PROCEDURE — 77010033678 HC OXYGEN DAILY

## 2021-09-20 PROCEDURE — 65270000029 HC RM PRIVATE

## 2021-09-20 PROCEDURE — 36415 COLL VENOUS BLD VENIPUNCTURE: CPT

## 2021-09-20 PROCEDURE — 85025 COMPLETE CBC W/AUTO DIFF WBC: CPT

## 2021-09-20 PROCEDURE — 80053 COMPREHEN METABOLIC PANEL: CPT

## 2021-09-20 PROCEDURE — 74011250637 HC RX REV CODE- 250/637: Performed by: PHYSICIAN ASSISTANT

## 2021-09-20 PROCEDURE — 99218 HC RM OBSERVATION: CPT

## 2021-09-20 PROCEDURE — 94760 N-INVAS EAR/PLS OXIMETRY 1: CPT

## 2021-09-20 PROCEDURE — 82962 GLUCOSE BLOOD TEST: CPT

## 2021-09-20 RX ORDER — BENZONATATE 100 MG/1
100 CAPSULE ORAL
Status: DISCONTINUED | OUTPATIENT
Start: 2021-09-20 | End: 2021-09-22 | Stop reason: HOSPADM

## 2021-09-20 RX ADMIN — PAROXETINE HYDROCHLORIDE 30 MG: 20 TABLET, FILM COATED ORAL at 21:52

## 2021-09-20 RX ADMIN — PANTOPRAZOLE SODIUM 20 MG: 20 TABLET, DELAYED RELEASE ORAL at 10:06

## 2021-09-20 RX ADMIN — CYANOCOBALAMIN TAB 1000 MCG 1000 MCG: 1000 TAB at 10:06

## 2021-09-20 RX ADMIN — GUAIFENESIN 600 MG: 600 TABLET, EXTENDED RELEASE ORAL at 10:06

## 2021-09-20 RX ADMIN — LEVOTHYROXINE SODIUM 75 MCG: 0.07 TABLET ORAL at 10:06

## 2021-09-20 RX ADMIN — OXYCODONE 5 MG: 5 TABLET ORAL at 04:50

## 2021-09-20 RX ADMIN — TRAZODONE HYDROCHLORIDE 50 MG: 50 TABLET ORAL at 21:52

## 2021-09-20 RX ADMIN — OXYCODONE 5 MG: 5 TABLET ORAL at 10:06

## 2021-09-20 RX ADMIN — ACETAMINOPHEN 650 MG: 325 TABLET ORAL at 13:33

## 2021-09-20 RX ADMIN — Medication 1000 UNITS: at 10:06

## 2021-09-20 RX ADMIN — BENZONATATE 100 MG: 100 CAPSULE ORAL at 06:22

## 2021-09-20 RX ADMIN — POTASSIUM CHLORIDE 10 MEQ: 750 TABLET, FILM COATED, EXTENDED RELEASE ORAL at 10:06

## 2021-09-20 RX ADMIN — FUROSEMIDE 80 MG: 40 TABLET ORAL at 10:06

## 2021-09-20 RX ADMIN — AZITHROMYCIN MONOHYDRATE 500 MG: 500 TABLET ORAL at 10:06

## 2021-09-20 RX ADMIN — OXYCODONE 5 MG: 5 TABLET ORAL at 17:02

## 2021-09-20 RX ADMIN — ATORVASTATIN CALCIUM 40 MG: 40 TABLET, FILM COATED ORAL at 10:06

## 2021-09-20 RX ADMIN — MONTELUKAST 10 MG: 10 TABLET, FILM COATED ORAL at 10:06

## 2021-09-20 RX ADMIN — PRAMIPEXOLE DIHYDROCHLORIDE 0.5 MG: 0.12 TABLET ORAL at 21:50

## 2021-09-20 RX ADMIN — OXYCODONE 5 MG: 5 TABLET ORAL at 21:51

## 2021-09-20 RX ADMIN — GUAIFENESIN 600 MG: 600 TABLET, EXTENDED RELEASE ORAL at 21:52

## 2021-09-20 NOTE — CONSULTS
Otolaryngology-HNS Consult    Subjective:    Nila Dawson   63 y.o.   1955     Chief complaint/reason for consult -epistaxis    History of present illness    72-year-old female baseline history of oxygen dependency at home, history of pulmonary embolism and stroke on aspirin and Xarelto at home. Presented with significant epistaxis on evening prior to admission. She ultimately had packing placed by the emergency room physician. This has appeared to have controlled the bleeding at this point. Patient states she had some small nosebleed 1 day prior from the left side. Her daughter is present who reports that the patient has been manipulating the nose and picking at it recently as well. No prior history of significant epistaxis. She has been on antiplatelet medications for up to 30 years. Denies any recent issues with poorly controlled blood pressure. Does endorse some nasal congestion. Review of Systems  Review of Systems   Constitutional: Negative for chills and fever. HENT: Positive for congestion, hearing loss and nosebleeds. Negative for ear pain and tinnitus. Eyes: Negative for blurred vision and double vision. Respiratory: Positive for shortness of breath. Negative for cough and sputum production. Cardiovascular: Negative for chest pain and palpitations. Gastrointestinal: Negative for heartburn, nausea and vomiting. Musculoskeletal: Negative for joint pain and neck pain. Skin: Negative. Neurological: Positive for headaches. Negative for dizziness, speech change and weakness. Endo/Heme/Allergies: Negative for environmental allergies. Bruises/bleeds easily. Psychiatric/Behavioral: Negative for memory loss. The patient does not have insomnia.           Past Medical History:   Diagnosis Date    Breast cancer ([address]     Chronic obstructive pulmonary disease ([facility]     Stroke (cerebrum) ([facility] 2015     Past Surgical History:   Procedure Laterality Date    HX BREAST LUMPECTOMY Right     HX HEART CATHETERIZATION        Family History   Family history unknown: Yes     Social History     Tobacco Use    Smoking status: Former Smoker     Packs/day: 1.50     Years: 20.00     Pack years: 30.00     Types: Cigarettes     Quit date: 2016     Years since quittin.7    Smokeless tobacco: Never Used   Substance Use Topics    Alcohol use: Not Currently      Prior to Admission medications    Medication Sig Start Date End Date Taking? Authorizing Provider   furosemide (Lasix) 80 mg tablet Take 80 mg by mouth daily. Yes Provider, Historical   PARoxetine (PaxiL) 30 mg tablet Take 30 mg by mouth nightly. Yes Provider, Historical   pramipexole (Mirapex) 0.5 mg tablet Take 0.5 mg by mouth nightly. Yes Provider, Historical   traZODone (DESYREL) 50 mg tablet Take 50 mg by mouth nightly. Yes Provider, Historical   meloxicam (MOBIC) 15 mg tablet Take 15 mg by mouth daily. Yes Provider, Historical   montelukast (SINGULAIR) 10 mg tablet Take 10 mg by mouth daily. Yes Provider, Historical   tiotropium (Spiriva with HandiHaler) 18 mcg inhalation capsule Take 1 Capsule by inhalation daily. Yes Provider, Historical   potassium chloride SR (KLOR-CON 10) 10 mEq tablet Take 10 mEq by mouth daily. Yes Provider, Historical   cyanocobalamin (Vitamin B-12) 1,000 mcg tablet Take 1,000 mcg by mouth daily. Yes Provider, Historical   Omeprazole delayed release (PRILOSEC D/R) 20 mg tablet Take 20 mg by mouth daily. Yes Provider, Historical   levothyroxine (Synthroid) 100 mcg tablet Take 75 mcg by mouth Daily (before breakfast). Yes Provider, Historical   albuterol (PROVENTIL HFA, VENTOLIN HFA, PROAIR HFA) 90 mcg/actuation inhaler Take 1 Puff by inhalation every six (6) hours as needed for Wheezing. Yes Provider, Historical   fexofenadine (ALLEGRA) 180 mg tablet Take 180 mg by mouth daily.    Yes Provider, Historical   cholecalciferol (Vitamin D3) (1000 Units /25 mcg) tablet Take 1,000 Units by mouth daily. Yes Provider, Historical   atorvastatin (LIPITOR) 40 mg tablet Take 40 mg by mouth daily. 8/20/21  Yes Other, MD Rose   aspirin delayed-release 81 mg tablet Take 81 mg by mouth daily. Yes Other, MD Rose   rivaroxaban (XARELTO PO) Take 20 mg by mouth. Yes Provider, Historical   guaiFENesin ER (MUCINEX) 600 mg ER tablet Take 1 Tab by mouth two (2) times a day. Indications: cold symptoms, cough 3/3/21  Yes Rylie Purcell, MASON   fluticasone propionate (FLONASE) 50 mcg/actuation nasal spray 1-2 sprays each nostril daily; place tip of nozzle in nose, aim towards same side sinus/eye, sniff gently and depress quickly, do not swallow. Indications: inflammation of the nose due to an allergy 3/3/21  Yes Rylie Purcell NP        No Known Allergies      Objective:     Visit Vitals  /65 (BP 1 Location: Left upper arm, BP Patient Position: At rest)   Pulse 77   Temp 98.2 °F (36.8 °C)   Resp 20   Ht 5' 7\" (1.702 m)   Wt 248 lb (112.5 kg)   SpO2 92%   Breastfeeding No   BMI 38.84 kg/m²        Physical Exam  Vitals reviewed. Constitutional:       General: She is awake. She is not in acute distress. Appearance: Normal appearance. She is well-groomed. She is obese. Interventions: Nasal cannula in place. HENT:      Head: Normocephalic and atraumatic. Jaw: There is normal jaw occlusion. No trismus, tenderness or malocclusion. Salivary Glands: Right salivary gland is not diffusely enlarged or tender. Left salivary gland is not diffusely enlarged or tender. Right Ear: Tympanic membrane, ear canal and external ear normal. Decreased hearing noted. Left Ear: Tympanic membrane, ear canal and external ear normal. Decreased hearing noted. Ears:      Hardwick exam findings: does not lateralize. Right Rinne: AC > BC. Left Rinne: AC > BC. Nose: Mucosal edema present. No nasal deformity.       Left Nostril: Epistaxis (Rapid Rhino packing in left nostril, saturated with dark blood no fresh blood noted) present. Right Turbinates: Not enlarged, swollen or pale. Right Sinus: No maxillary sinus tenderness or frontal sinus tenderness. Left Sinus: No maxillary sinus tenderness or frontal sinus tenderness. Mouth/Throat:      Lips: Pink. No lesions. Mouth: Mucous membranes are dry. No oral lesions. Dentition: Normal dentition. No dental caries. Tongue: No lesions. Palate: No mass and lesions. Pharynx: Uvula midline. No oropharyngeal exudate or posterior oropharyngeal erythema. Tonsils: No tonsillar exudate. 0 on the right. 0 on the left. Comments: Spots of dried blood along posterior oropharynx  Eyes:      General: Vision grossly intact. Extraocular Movements: Extraocular movements intact. Right eye: No nystagmus. Left eye: No nystagmus. Conjunctiva/sclera: Conjunctivae normal.      Pupils: Pupils are equal, round, and reactive to light. Neck:      Thyroid: No thyroid mass, thyromegaly or thyroid tenderness. Trachea: Trachea and phonation normal. No tracheal tenderness or tracheal deviation. Cardiovascular:      Rate and Rhythm: Normal rate and regular rhythm. Pulmonary:      Effort: Pulmonary effort is normal. No respiratory distress. Breath sounds: No stridor. Musculoskeletal:         General: No swelling or tenderness. Normal range of motion. Cervical back: No edema or erythema. Lymphadenopathy:      Cervical: No cervical adenopathy. Skin:     General: Skin is warm and dry. Findings: No lesion or rash. Neurological:      General: No focal deficit present. Mental Status: She is alert and oriented to person, place, and time. Mental status is at baseline. Cranial Nerves: Cranial nerves are intact. Psychiatric:         Mood and Affect: Mood normal.         Behavior: Behavior normal. Behavior is cooperative.            Recent Results (from the past 24 hour(s))   CBC WITH AUTOMATED DIFF    Collection Time: 09/19/21  9:23 AM   Result Value Ref Range    WBC 6.6 3.6 - 11.0 K/uL    RBC 3.91 3.80 - 5.20 M/uL    HGB 11.9 11.5 - 16.0 g/dL    HCT 36.8 35.0 - 47.0 %    MCV 94.1 80.0 - 99.0 FL    MCH 30.4 26.0 - 34.0 PG    MCHC 32.3 30.0 - 36.5 g/dL    RDW 14.2 11.5 - 14.5 %    PLATELET 897 (L) 876 - 400 K/uL    MPV 13.0 (H) 8.9 - 12.9 FL    NRBC 0.0 0.0  WBC    ABSOLUTE NRBC 0.00 0.00 - 0.01 K/uL    NEUTROPHILS 59 32 - 75 %    LYMPHOCYTES 27 12 - 49 %    MONOCYTES 11 5 - 13 %    EOSINOPHILS 2 0 - 7 %    BASOPHILS 1 0 - 1 %    IMMATURE GRANULOCYTES 0 0 - 0.5 %    ABS. NEUTROPHILS 3.9 1.8 - 8.0 K/UL    ABS. LYMPHOCYTES 1.7 0.8 - 3.5 K/UL    ABS. MONOCYTES 0.7 0.0 - 1.0 K/UL    ABS. EOSINOPHILS 0.1 0.0 - 0.4 K/UL    ABS. BASOPHILS 0.0 0.0 - 0.1 K/UL    ABS. IMM.  GRANS. 0.0 0.00 - 0.04 K/UL    DF AUTOMATED     METABOLIC PANEL, BASIC    Collection Time: 09/19/21  9:23 AM   Result Value Ref Range    Sodium 141 136 - 145 mmol/L    Potassium 3.9 3.5 - 5.1 mmol/L    Chloride 105 97 - 108 mmol/L    CO2 32 21 - 32 mmol/L    Anion gap 4 (L) 5 - 15 mmol/L    Glucose 120 (H) 65 - 100 mg/dL    BUN 30 (H) 6 - 20 mg/dL    Creatinine 0.85 0.55 - 1.02 mg/dL    BUN/Creatinine ratio 35 (H) 12 - 20      GFR est AA >60 >60 ml/min/1.73m2    GFR est non-AA >60 >60 ml/min/1.73m2    Calcium 9.1 8.5 - 10.1 mg/dL   CBC WITH AUTOMATED DIFF    Collection Time: 09/20/21  6:45 AM   Result Value Ref Range    WBC 8.8 3.6 - 11.0 K/uL    RBC 4.03 3.80 - 5.20 M/uL    HGB 12.1 11.5 - 16.0 g/dL    HCT 39.4 35.0 - 47.0 %    MCV 97.8 80.0 - 99.0 FL    MCH 30.0 26.0 - 34.0 PG    MCHC 30.7 30.0 - 36.5 g/dL    RDW 14.5 11.5 - 14.5 %    PLATELET 724 (L) 854 - 400 K/uL    MPV 12.7 8.9 - 12.9 FL    NRBC 0.0 0.0  WBC    ABSOLUTE NRBC 0.00 0.00 - 0.01 K/uL    NEUTROPHILS 64 32 - 75 %    LYMPHOCYTES 21 12 - 49 %    MONOCYTES 12 5 - 13 %    EOSINOPHILS 2 0 - 7 %    BASOPHILS 1 0 - 1 %    IMMATURE GRANULOCYTES 0 0 - 0.5 %    ABS. NEUTROPHILS 5.7 1.8 - 8.0 K/UL    ABS. LYMPHOCYTES 1.9 0.8 - 3.5 K/UL    ABS. MONOCYTES 1.0 0.0 - 1.0 K/UL    ABS. EOSINOPHILS 0.1 0.0 - 0.4 K/UL    ABS. BASOPHILS 0.0 0.0 - 0.1 K/UL    ABS. IMM. GRANS. 0.0 0.00 - 0.04 K/UL    DF AUTOMATED     METABOLIC PANEL, COMPREHENSIVE    Collection Time: 09/20/21  6:45 AM   Result Value Ref Range    Sodium 142 136 - 145 mmol/L    Potassium 4.3 3.5 - 5.1 mmol/L    Chloride 106 97 - 108 mmol/L    CO2 35 (H) 21 - 32 mmol/L    Anion gap 1 (L) 5 - 15 mmol/L    Glucose 124 (H) 65 - 100 mg/dL    BUN 20 6 - 20 mg/dL    Creatinine 0.75 0.55 - 1.02 mg/dL    BUN/Creatinine ratio 27 (H) 12 - 20      GFR est AA >60 >60 ml/min/1.73m2    GFR est non-AA >60 >60 ml/min/1.73m2    Calcium 9.4 8.5 - 10.1 mg/dL    Bilirubin, total 0.5 0.2 - 1.0 mg/dL    AST (SGOT) 32 15 - 37 U/L    ALT (SGPT) 35 12 - 78 U/L    Alk. phosphatase 52 45 - 117 U/L    Protein, total 7.2 6.4 - 8.2 g/dL    Albumin 3.4 (L) 3.5 - 5.0 g/dL    Globulin 3.8 2.0 - 4.0 g/dL    A-G Ratio 0.9 (L) 1.1 - 2.2     GLUCOSE, POC    Collection Time: 09/20/21  7:52 AM   Result Value Ref Range    Glucose (POC) 134 (H) 65 - 117 mg/dL    Performed by Neisha Saunders           I have personally reviewed all pertinent notes, labs, results, and imaging studies for this patient. Assessment/Plan:     Epistaxis  Oxygen dependence  Antiplatelet therapy    Patient likely has severe anterior epistaxis from the left side secondary to nasal manipulation and antiplatelet therapy. She has been packed and this is holding appropriately. Currently her aspirin Xarelto are being held, due to her high risk with prior thromboembolic activity I will defer to medical and hematology team as to her appropriate antiplatelet regimen in the setting of recent heavy bleeding. Hemoglobin hematocrit are stable. Would recommend packing remain in for at least 48 hours. I would likely remove the packing Tuesday morning.   In the meantime she can remain on oral antibiotics and pain control. We will add saline spray for the right nostril. Monitor oxygenation as she is dependent upon nasal cannula but only has a patent right nasal cavity at this time. Thank you for this consult. Wojciech Coreas MD, [address] [facility]    [address] [place] [address] [address]  [facility]    [id number] [address]  Renea, [place] [address] [facility] [id number]

## 2021-09-20 NOTE — PROGRESS NOTES
Hospitalist Progress Note         ANGUS Lynn, FNP-C    Daily Progress Note: 9/20/2021    Hospital course:     This is 62-year-old female admitted on 9/18/2021 with epistaxis from the left nare. 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 Patient was discontinued on her anticoagulation and had a stroke 72 hours later. This reason patient has been on Xarelto or anticoagulation for 30+ years. Unfortunately she developed large volume epistaxis from the left nare. Rhino Rocket was placed with cessation of the bleeding. Anticoagulation and aspirin have been discontinued. ENT consult pending. Most likely the cause was related to the patient's chronic respiratory failure with hypoxia and requiring 3 L of oxygen via nasal cannula. She does not use humidified circuit. Chest x-ray revealed reticular opacities and peribronchial thickening. Have started azithromycin for sinus prophylaxis as well as possible bronchitis. Subjective:   Subjective   Patient seen on f/u alert and oriented lying in bed w/ spouse at bedside  No complaints voiced on examination  No acute distress noted    Review of Systems:   Review of Systems   Constitutional: Negative. HENT: Negative. Eyes: Negative. Respiratory: Negative. Cardiovascular: Negative. Genitourinary: Negative. Musculoskeletal: Negative. Skin: Negative. Neurological: Negative. Endo/Heme/Allergies: Negative. Psychiatric/Behavioral: Negative. Objective:   Objective      Vitals:  Patient Vitals for the past 12 hrs:   Temp Pulse Resp BP SpO2   09/20/21 0836 -- -- -- -- 92 %   09/20/21 0752 98.2 °F (36.8 °C) 77 20 127/65 (!) 89 %   09/20/21 0449 98.1 °F (36.7 °C) 80 20 (!) 141/73 93 %   09/19/21 2158 98.3 °F (36.8 °C) 76 20 131/66 90 %        Physical Exam:  Physical Exam  Vitals reviewed. Constitutional:       Appearance: She is obese. HENT:      Nose:      Comments: Rhino Rocket left nare.   No obvious bleeding     Mouth/Throat:      Mouth: Mucous membranes are moist.   Eyes:      Conjunctiva/sclera: Conjunctivae normal.   Cardiovascular:      Rate and Rhythm: Normal rate and regular rhythm. Pulses: Normal pulses. Heart sounds: Normal heart sounds. Pulmonary:      Effort: Pulmonary effort is normal.      Breath sounds: Normal breath sounds. Abdominal:      General: Bowel sounds are normal.   Musculoskeletal:         General: Normal range of motion. Skin:     General: Skin is warm and dry. Capillary Refill: Capillary refill takes less than 2 seconds. Neurological:      Mental Status: She is alert and oriented to person, place, and time. Mental status is at baseline. Psychiatric:         Mood and Affect: Mood normal.         Behavior: Behavior normal.          Lab Results:  Recent Results (from the past 24 hour(s))   CBC WITH AUTOMATED DIFF    Collection Time: 09/19/21  9:23 AM   Result Value Ref Range    WBC 6.6 3.6 - 11.0 K/uL    RBC 3.91 3.80 - 5.20 M/uL    HGB 11.9 11.5 - 16.0 g/dL    HCT 36.8 35.0 - 47.0 %    MCV 94.1 80.0 - 99.0 FL    MCH 30.4 26.0 - 34.0 PG    MCHC 32.3 30.0 - 36.5 g/dL    RDW 14.2 11.5 - 14.5 %    PLATELET 640 (L) 590 - 400 K/uL    MPV 13.0 (H) 8.9 - 12.9 FL    NRBC 0.0 0.0  WBC    ABSOLUTE NRBC 0.00 0.00 - 0.01 K/uL    NEUTROPHILS 59 32 - 75 %    LYMPHOCYTES 27 12 - 49 %    MONOCYTES 11 5 - 13 %    EOSINOPHILS 2 0 - 7 %    BASOPHILS 1 0 - 1 %    IMMATURE GRANULOCYTES 0 0 - 0.5 %    ABS. NEUTROPHILS 3.9 1.8 - 8.0 K/UL    ABS. LYMPHOCYTES 1.7 0.8 - 3.5 K/UL    ABS. MONOCYTES 0.7 0.0 - 1.0 K/UL    ABS. EOSINOPHILS 0.1 0.0 - 0.4 K/UL    ABS. BASOPHILS 0.0 0.0 - 0.1 K/UL    ABS. IMM.  GRANS. 0.0 0.00 - 0.04 K/UL    DF AUTOMATED     METABOLIC PANEL, BASIC    Collection Time: 09/19/21  9:23 AM   Result Value Ref Range    Sodium 141 136 - 145 mmol/L    Potassium 3.9 3.5 - 5.1 mmol/L    Chloride 105 97 - 108 mmol/L    CO2 32 21 - 32 mmol/L    Anion gap 4 (L) 5 - 15 mmol/L    Glucose 120 (H) 65 - 100 mg/dL    BUN 30 (H) 6 - 20 mg/dL    Creatinine 0.85 0.55 - 1.02 mg/dL    BUN/Creatinine ratio 35 (H) 12 - 20      GFR est AA >60 >60 ml/min/1.73m2    GFR est non-AA >60 >60 ml/min/1.73m2    Calcium 9.1 8.5 - 10.1 mg/dL   CBC WITH AUTOMATED DIFF    Collection Time: 09/20/21  6:45 AM   Result Value Ref Range    WBC 8.8 3.6 - 11.0 K/uL    RBC 4.03 3.80 - 5.20 M/uL    HGB 12.1 11.5 - 16.0 g/dL    HCT 39.4 35.0 - 47.0 %    MCV 97.8 80.0 - 99.0 FL    MCH 30.0 26.0 - 34.0 PG    MCHC 30.7 30.0 - 36.5 g/dL    RDW 14.5 11.5 - 14.5 %    PLATELET 139 (L) 019 - 400 K/uL    MPV 12.7 8.9 - 12.9 FL    NRBC 0.0 0.0  WBC    ABSOLUTE NRBC 0.00 0.00 - 0.01 K/uL    NEUTROPHILS 64 32 - 75 %    LYMPHOCYTES 21 12 - 49 %    MONOCYTES 12 5 - 13 %    EOSINOPHILS 2 0 - 7 %    BASOPHILS 1 0 - 1 %    IMMATURE GRANULOCYTES 0 0 - 0.5 %    ABS. NEUTROPHILS 5.7 1.8 - 8.0 K/UL    ABS. LYMPHOCYTES 1.9 0.8 - 3.5 K/UL    ABS. MONOCYTES 1.0 0.0 - 1.0 K/UL    ABS. EOSINOPHILS 0.1 0.0 - 0.4 K/UL    ABS. BASOPHILS 0.0 0.0 - 0.1 K/UL    ABS. IMM. GRANS. 0.0 0.00 - 0.04 K/UL    DF AUTOMATED     METABOLIC PANEL, COMPREHENSIVE    Collection Time: 09/20/21  6:45 AM   Result Value Ref Range    Sodium 142 136 - 145 mmol/L    Potassium 4.3 3.5 - 5.1 mmol/L    Chloride 106 97 - 108 mmol/L    CO2 35 (H) 21 - 32 mmol/L    Anion gap 1 (L) 5 - 15 mmol/L    Glucose 124 (H) 65 - 100 mg/dL    BUN 20 6 - 20 mg/dL    Creatinine 0.75 0.55 - 1.02 mg/dL    BUN/Creatinine ratio 27 (H) 12 - 20      GFR est AA >60 >60 ml/min/1.73m2    GFR est non-AA >60 >60 ml/min/1.73m2    Calcium 9.4 8.5 - 10.1 mg/dL    Bilirubin, total 0.5 0.2 - 1.0 mg/dL    AST (SGOT) 32 15 - 37 U/L    ALT (SGPT) 35 12 - 78 U/L    Alk.  phosphatase 52 45 - 117 U/L    Protein, total 7.2 6.4 - 8.2 g/dL    Albumin 3.4 (L) 3.5 - 5.0 g/dL    Globulin 3.8 2.0 - 4.0 g/dL    A-G Ratio 0.9 (L) 1.1 - 2.2     GLUCOSE, POC    Collection Time: 09/20/21  7:52 AM   Result Value Ref Range    Glucose (POC) 134 (H) 65 - 117 mg/dL    Performed by Juan Diego Jamison           Diagnostic Images:  CT Results  (Last 48 hours)    None          Current Medications:    Current Facility-Administered Medications:     benzonatate (TESSALON) capsule 100 mg, 100 mg, Oral, TID PRN, Bam Gardner MD, 100 mg at 09/20/21 0622    sodium chloride (OCEAN) 0.65 % nasal squeeze bottle 2 Spray, 2 Spray, Right Nostril, Q2H PRN, Wojciech Maciel MD    atorvastatin (LIPITOR) tablet 40 mg, 40 mg, Oral, DAILY, Bam Gardner MD, 40 mg at 09/19/21 0915    guaiFENesin ER (MUCINEX) tablet 600 mg, 600 mg, Oral, BID, Bam Gardner MD, 600 mg at 09/19/21 2255    acetaminophen (TYLENOL) tablet 650 mg, 650 mg, Oral, Q6H PRN, 650 mg at 09/19/21 0427 **OR** acetaminophen (TYLENOL) suppository 650 mg, 650 mg, Rectal, Q6H PRN, Bam Gardner MD    polyethylene glycol (MIRALAX) packet 17 g, 17 g, Oral, DAILY PRN, Bam Gardner MD    ondansetron (ZOFRAN ODT) tablet 4 mg, 4 mg, Oral, Q8H PRN **OR** ondansetron (ZOFRAN) injection 4 mg, 4 mg, IntraVENous, Q6H PRN, Bam Gardner MD    levothyroxine (SYNTHROID) tablet 75 mcg, 75 mcg, Oral, ACB, Bam Gardner MD, 75 mcg at 09/19/21 0915    pantoprazole (PROTONIX) tablet 20 mg, 20 mg, Oral, ACBMaximo Ilona Roles, MD, 20 mg at 09/19/21 0915    azithromycin (ZITHROMAX) tablet 500 mg, 500 mg, Oral, DAILY, Charly Walton PA-C, 500 mg at 09/19/21 1219    oxyCODONE IR (ROXICODONE) tablet 5 mg, 5 mg, Oral, Q4H PRN, Charly Walton PA-C, 5 mg at 09/20/21 0450    albuterol (PROVENTIL HFA, VENTOLIN HFA, PROAIR HFA) inhaler 1 Puff, 1 Puff, Inhalation, Q6H PRN, Charly Walton PA-C    cholecalciferol (VITAMIN D3) (1000 Units /25 mcg) tablet 1,000 Units, 1,000 Units, Oral, DAILY, Chava Walton PA-C, 1,000 Units at 09/19/21 1219    cyanocobalamin tablet 1,000 mcg, 1,000 mcg, Oral, DAILY, Charly Walton PA-C, 1,000 mcg at 09/19/21 1219   furosemide (LASIX) tablet 80 mg, 80 mg, Oral, DAILY, Kimberly Walton PA-C, 80 mg at 09/19/21 1219    montelukast (SINGULAIR) tablet 10 mg, 10 mg, Oral, DAILY, Charly Walton PA-C, 10 mg at 09/19/21 1219    PARoxetine (PAXIL) tablet 30 mg, 30 mg, Oral, QHS, Charly Walton PA-C, 30 mg at 09/19/21 2254    potassium chloride SR (KLOR-CON 10) tablet 10 mEq, 10 mEq, Oral, DAILY, Kimberly Walton PA-C, 10 mEq at 09/19/21 1219    pramipexole (MIRAPEX) tablet 0.5 mg, 0.5 mg, Oral, QHS, Charly Walton PA-C, 0.5 mg at 09/19/21 2259    tiotropium bromide (SPIRIVA RESPIMAT) 2.5 mcg /actuation, 2 Puff, Inhalation, DAILY, Charly Walton PA-C    traZODone (DESYREL) tablet 50 mg, 50 mg, Oral, QHS, Charly Walton PA-C, 50 mg at 09/19/21 2200       ASSESSMENT:    1. Left nare epistaxis: Rhino Rocket with cessation of bleeding. Remove packing on Tuesday morning. Hemoglobin stable. Xarelto and aspirin discontinued. ENT following. Prophylactic azithromycin     2. History of DVT, CVA, and cardiac embolism: Holding Xarelto and aspirin for now. Obtain hematology consult for anticoag recommendation     3. Chronic respiratory failure with hypoxia secondary to emphysema: Continue oxygen via nasal cannula, humidified circuit     4. Bronchitis: via x-ray: Azithromycin     5. KASSANDRA: BUN/creatinine ratio greater than 20 most likely related to dehydration  Continue gentle hydration        Full Code  Dvt Prophylaxis SCDs  GI Prophylaxis protonix  Discharge barriers: Cessation of epistaxis, ENT clearance      Above treatment plan reviewed and discussed with patient/spouse in detail at bedside, all questions answered. Care Plan discussed with: Interdisciplinary team    Total time spent with patient: >35 minutes.     Manuel Dalton NP

## 2021-09-20 NOTE — CONSULTS
Hematology/Oncology Consult    Patient: Aliza Wing MRN: [id number]     YOB: 1955  Age: 63 y.o. Sex: female      BHUPINDER Wing is a 63 y.o. female who is being seen for epistaxis in the setting of hypercoagulable state. Ms. Michael Brown is a pleasant 47-year-old female patient who follows with my partner, Dr. Soni Ward. She has a history of right breast DCIS diagnosed in  which is treated with surgery and radiation therapy. She was reportedly unable to tolerate antiestrogen therapy. She also reports a history of unprovoked pulmonary embolism over 30 years ago and has been on long-term anticoagulation since that time. At the time of admission she has been on Xarelto 20 mg p.o. daily as well as aspirin 81 mg daily. She reports that in  when Xarelto was held for 3 days at the time of her breast surgery, she developed a TIA. She now presents with 1 day history of significant epistaxis from the left nostril. She denies any prior history of epistaxis. She does report manipulating the left nostril over the last few days. Patient reports significant amount of blood and clots. Her left nostril is now packed and she does not seem to have any active bleeding at this time.     Past Medical History:   Diagnosis Date    [facility]     Chronic obstructive pulmonary disease [facility]     Stroke (cerebrum) ([facility]      Past Surgical History:   Procedure Laterality Date    HX BREAST LUMPECTOMY Right     HX HEART CATHETERIZATION        Family History   Family history unknown: Yes     Social History     Tobacco Use    Smoking status: Former Smoker     Packs/day: 1.50     Years: 20.00     Pack years: 30.00     Types: Cigarettes     Quit date: 2016     Years since quittin.7    Smokeless tobacco: Never Used   Substance Use Topics    Alcohol use: Not Currently      Current Facility-Administered Medications   Medication Dose Route Frequency Provider Last Rate Last Admin    benzonatate (TESSALON) capsule 100 mg  100 mg Oral TID PRN Brittaney Baig MD   100 mg at 09/20/21 0622    sodium chloride (OCEAN) 0.65 % nasal squeeze bottle 2 Spray  2 Spray Right Nostril Q2H PRN Alysha Saavedra MD        atorvastatin (LIPITOR) tablet 40 mg  40 mg Oral DAILY Brittaney Baig MD   40 mg at 09/20/21 1006    guaiFENesin ER (MUCINEX) tablet 600 mg  600 mg Oral BID Brittaney Baig MD   600 mg at 09/20/21 1006    acetaminophen (TYLENOL) tablet 650 mg  650 mg Oral Q6H PRN Brittaney Baig MD   650 mg at 09/20/21 1333    Or    acetaminophen (TYLENOL) suppository 650 mg  650 mg Rectal Q6H PRN Brittaney Baig MD        polyethylene glycol (MIRALAX) packet 17 g  17 g Oral DAILY PRN Brittaney Baig MD        ondansetron (ZOFRAN ODT) tablet 4 mg  4 mg Oral Q8H PRN Brittaney Baig MD        Or    ondansetron [facility]) injection 4 mg  4 mg IntraVENous Q6H PRN Brittaney Baig MD        levothyroxine (SYNTHROID) tablet 75 mcg  75 mcg Oral ACB Brittaney Baig MD   75 mcg at 09/20/21 1006    pantoprazole (PROTONIX) tablet 20 mg  20 mg Oral ACB Brittaney Baig MD   20 mg at 09/20/21 1006    azithromycin (ZITHROMAX) tablet 500 mg  500 mg Oral DAILY Laith Walton PA-C   500 mg at 09/20/21 1006    oxyCODONE IR (ROXICODONE) tablet 5 mg  5 mg Oral Q4H PRN Francisco Javier Bruno PA-C   5 mg at 09/20/21 1702    albuterol (PROVENTIL HFA, VENTOLIN HFA, PROAIR HFA) inhaler 1 Puff  1 Puff Inhalation Q6H PRN Laith Walton PA-C        cholecalciferol (VITAMIN D3) (1000 Units /25 mcg) tablet 1,000 Units  1,000 Units Oral DAILY Francisco Javier Bruno PA-C   1,000 Units at 09/20/21 1006    cyanocobalamin tablet 1,000 mcg  1,000 mcg Oral DAILY Francisco Javier Bruno PA-C   1,000 mcg at 09/20/21 1006    furosemide (LASIX) tablet 80 mg  80 mg Oral DAILY Francisco Javier Bruno PA-C   80 mg at 09/20/21 1006    montelukast (SINGULAIR) tablet 10 mg  10 mg Oral DAILY Art Daily, PA-C   10 mg at 09/20/21 1006    PARoxetine (PAXIL) tablet 30 mg  30 mg Oral QHS Art Daily, PA-C   30 mg at 09/19/21 2254    potassium chloride SR (KLOR-CON 10) tablet 10 mEq  10 mEq Oral DAILY Art Daily, PA-C   10 mEq at 09/20/21 1006    pramipexole (MIRAPEX) tablet 0.5 mg  0.5 mg Oral QHS Art Daily, PA-C   0.5 mg at 09/19/21 2259    tiotropium bromide (SPIRIVA RESPIMAT) 2.5 mcg /actuation  2 Puff Inhalation DAILY ReasonerChristiano Ahr, PA-C        traZODone (DESYREL) tablet 50 mg  50 mg Oral QHS Art Daily, PA-C   50 mg at 09/19/21 2200        No Known Allergies    Review of Systems:  Constitutional No fevers, chills, night sweats, excessive fatigue or weight loss. Allergic/Immunologic No recent allergic reactions   Eyes No significant visual difficulties. No diplopia. ENMT No problems with hearing, no sore throat, no sinus drainage. Endocrine No hot flashes or night sweats. No cold intolerance, polyuria, or polydipsia   Hematologic/Lymphatic No easy bruising or bleeding. The patient denies any tender or palpable lymph nodes   Breasts No abnormal masses of breast, nipple discharge or pain. Respiratory No dyspnea on exertion, orthopnea, chest pain, cough or hemoptysis. Cardiovascular No anginal chest pain, irregular heart beat, tachycardia, palpitations or orthopnea. Gastrointestinal No nausea, vomiting, diarrhea, constipation, cramping, dysphagia, reflux, heartburn, GI bleeding, or early satiety. No change in bowel habits. Genitourinary (M) No hematuria, dysuria, increased frequency, urgency, hesitancy or incontinence. Musculoskeletal No joint pain, swelling or redness. No decreased range of motion. Integumentary No chronic rashes, inflammation, ulcerations, pruritus, petechiae, purpura, ecchymoses, or skin changes. Neurologic No headache, blurred vision, and no areas of focal weakness or numbness. Normal gait. No sensory problems. Psychiatric No insomnia, depression, shira or mood swings. No psychotropic drugs. Objective:     Vitals:    09/20/21 1046 09/20/21 1051 09/20/21 1108 09/20/21 1458   BP:  (!) 112/58  112/63   Pulse:  82  80   Resp:  17  17   Temp:  97.8 °F (36.6 °C)  98 °F (36.7 °C)   SpO2: 92% (!) 89% 96% 90%   Weight:       Height:            Physical Exam:  Constitutional Alert, cooperative, oriented. Mood and affect appropriate. Appears close to chronological age. Well nourished. Well developed. Head Normocephalic; no scars   Eyes Conjunctivae and sclerae are clear and without icterus. Pupils are reactive and equal.   ENMT  left nostril is packed by rapid Rhino packing. Neck Supple without masses or thyromegaly. No jugular venous distension. Hematologic/Lymphatic No petechiae or purpura. No tender or palpable lymph nodes in the cervical, supraclavicular, axillary or inguinal area. Respiratory Lungs are clear to auscultation without rhonchi or wheezing. Cardiovascular Regular rate and rhythm of heart without murmurs, gallops or rubs. Chest / Line Site Chest is symmetric with no chest wall deformities. Abdomen Non-tender, non-distended, no masses, ascites or hepatosplenomegaly. Good bowel sounds. No guarding or rebound tenderness. No pulsatile masses. Musculoskeletal No tenderness or swelling, normal range of motion without obvious weakness. Extremities No visible deformities, no cyanosis, clubbing or edema. Skin No rashes, scars, or lesions suggestive of malignancy. No petechiae, purpura, or ecchymoses. No excoriations. Neurologic No sensory or motor deficits, normal cerebellar function, normal gait, cranial nerves intact. Psychiatric Alert and oriented times three. Coherent speech. Verbalizes understanding of our discussions today.      Lab/Data Review:  Recent Labs     09/20/21  0645 09/19/21  0923 09/18/21  2215   WBC 8.8 6.6 9.1   HGB 12.1 11.9 13.3   HCT 39.4 36.8 40.6   * 142* 169 Recent Labs     09/20/21  0645 09/19/21  0923 09/18/21  2215    141 140   K 4.3 3.9 3.7    105 100   CO2 35* 32 28   * 120* 105*   BUN 20 30* 33*   CREA 0.75 0.85 1.38*   CA 9.4 9.1 9.4   ALB 3.4*  --   --    TBILI 0.5  --   --    ALT 35  --   --    INR  --   --  1.2*     No results for input(s): PH, PCO2, PO2, HCO3, FIO2 in the last 72 hours. Recent Results (from the past 24 hour(s))   CBC WITH AUTOMATED DIFF    Collection Time: 09/20/21  6:45 AM   Result Value Ref Range    WBC 8.8 3.6 - 11.0 K/uL    RBC 4.03 3.80 - 5.20 M/uL    HGB 12.1 11.5 - 16.0 g/dL    HCT 39.4 35.0 - 47.0 %    MCV 97.8 80.0 - 99.0 FL    MCH 30.0 26.0 - 34.0 PG    MCHC 30.7 30.0 - 36.5 g/dL    RDW 14.5 11.5 - 14.5 %    PLATELET 971 (L) 801 - 400 K/uL    MPV 12.7 8.9 - 12.9 FL    NRBC 0.0 0.0  WBC    ABSOLUTE NRBC 0.00 0.00 - 0.01 K/uL    NEUTROPHILS 64 32 - 75 %    LYMPHOCYTES 21 12 - 49 %    MONOCYTES 12 5 - 13 %    EOSINOPHILS 2 0 - 7 %    BASOPHILS 1 0 - 1 %    IMMATURE GRANULOCYTES 0 0 - 0.5 %    ABS. NEUTROPHILS 5.7 1.8 - 8.0 K/UL    ABS. LYMPHOCYTES 1.9 0.8 - 3.5 K/UL    ABS. MONOCYTES 1.0 0.0 - 1.0 K/UL    ABS. EOSINOPHILS 0.1 0.0 - 0.4 K/UL    ABS. BASOPHILS 0.0 0.0 - 0.1 K/UL    ABS. IMM. GRANS. 0.0 0.00 - 0.04 K/UL    DF AUTOMATED     METABOLIC PANEL, COMPREHENSIVE    Collection Time: 09/20/21  6:45 AM   Result Value Ref Range    Sodium 142 136 - 145 mmol/L    Potassium 4.3 3.5 - 5.1 mmol/L    Chloride 106 97 - 108 mmol/L    CO2 35 (H) 21 - 32 mmol/L    Anion gap 1 (L) 5 - 15 mmol/L    Glucose 124 (H) 65 - 100 mg/dL    BUN 20 6 - 20 mg/dL    Creatinine 0.75 0.55 - 1.02 mg/dL    BUN/Creatinine ratio 27 (H) 12 - 20      GFR est AA >60 >60 ml/min/1.73m2    GFR est non-AA >60 >60 ml/min/1.73m2    Calcium 9.4 8.5 - 10.1 mg/dL    Bilirubin, total 0.5 0.2 - 1.0 mg/dL    AST (SGOT) 32 15 - 37 U/L    ALT (SGPT) 35 12 - 78 U/L    Alk.  phosphatase 52 45 - 117 U/L    Protein, total 7.2 6.4 - 8.2 g/dL    Albumin 3.4 (L) 3.5 - 5.0 g/dL    Globulin 3.8 2.0 - 4.0 g/dL    A-G Ratio 0.9 (L) 1.1 - 2.2     GLUCOSE, POC    Collection Time: 09/20/21  7:52 AM   Result Value Ref Range    Glucose (POC) 134 (H) 65 - 117 mg/dL    Performed by Alonso Kehr         No results found. Assessment and Plan:     Hospital Problems  Date Reviewed: 3/3/2021        Codes Class Noted POA    * (Principal) Epistaxis ICD-10-CM: R04.0  ICD-9-CM: 784.7  9/20/2021 Unknown        Bleeding from the nose ICD-10-CM: R04.0  ICD-9-CM: 784.7  9/18/2021 Unknown        Anticoagulated by anticoagulation treatment ICD-10-CM: Z79.01  ICD-9-CM: V58.61  9/18/2021 Unknown              Epistaxis;  -Significant epistaxis from the left nostril in the setting of long-term anticoagulation for hypercoagulable state.  -Anticoagulation including antiplatelet therapy is rightly being held. -She currently has a left nostril Rhino packing which is likely to be removed tomorrow per Dr. Alvin Smith.  -There is been about a 2 g/dL follow hemoglobin since admission, but appears to be stabilizing. Hypercoagulable state:  -Reportedly diagnosed with unprovoked pulmonary embolism over 30 years ago and has been on long-term anticoagulation since that time. -Most recently on Xarelto 20 mg daily and aspirin 81 mg daily. Both of these medications have been held due to admission with significant epistaxis.  -We will reassess patient's condition including any further bleeding issues tomorrow.  -Options include restarting aspirin 81 mg p.o. daily for now.  -We could consider restarting Xarelto at a lower dose of 10 mg per daily in a week, provided the patient does not have any further bleeding.  -We will discuss with ENT. History of TIA:  -Reportedly diagnosed in 2015 when Xarelto was held for 3 days for breast surgery.  -Details are unclear at this time.  -As noted above we will consider starting aspirin 81 mg p.o. daily, if no further bleeding after packing is removed tomorrow.     Right breast DCIS:  -Diagnosed in 2015, status post surgery and radiation therapy.  -Could not tolerate antiestrogen therapy. Thank you for the consult. Patient has an appointment with Dr. Onelia Martin on 9/27/2021.     Signed By: Gissel Chiu MD     September 20, 2021

## 2021-09-20 NOTE — PROGRESS NOTES
Reason for Admission: Bleeding from the nose, Anticoagulation                       RUR Score: 8%                    Plan for utilizing home health: none at this time          PCP: First and Last name:  Ulises Hager MD     Name of Practice:    Are you a current patient: Yes/No: yes   Approximate date of last visit: last week   Can you participate in a virtual visit with your PCP: yes                    Current Advanced Directive/Advance Care Plan: Full Code      Healthcare Decision Maker:   Click here to complete [id number] including selection of the Healthcare Decision Maker Relationship (ie \"Primary\")             Primary Decision Maker: Yazan [facility] - [phone]                  Transition of Care Plan:  Cm met with the patient at the bedside along with her friend, Wendie Romberg, to complete assessment. Patient reported she lives in her own \"mini\" house in her son's backyard. She reported she is independent in her level of functioning and takes care of herself. She does not use a cane or walker but reported she uses continuous oxygen at 4L [place] through [facility]. We discussed potential discharge needs and she denies any at this time. CM will continue to follow.

## 2021-09-21 LAB
ALBUMIN SERPL-MCNC: 3.2 G/DL (ref 3.5–5)
ALBUMIN/GLOB SERPL: 0.8 {RATIO} (ref 1.1–2.2)
ALP SERPL-CCNC: 54 U/L (ref 45–117)
ALT SERPL-CCNC: 30 U/L (ref 12–78)
ANION GAP SERPL CALC-SCNC: 2 MMOL/L (ref 5–15)
AST SERPL W P-5'-P-CCNC: 27 U/L (ref 15–37)
BASOPHILS # BLD: 0 K/UL (ref 0–0.1)
BASOPHILS NFR BLD: 0 % (ref 0–1)
BILIRUB SERPL-MCNC: 0.5 MG/DL (ref 0.2–1)
BUN SERPL-MCNC: 17 MG/DL (ref 6–20)
BUN/CREAT SERPL: 22 (ref 12–20)
CA-I BLD-MCNC: 9.3 MG/DL (ref 8.5–10.1)
CHLORIDE SERPL-SCNC: 101 MMOL/L (ref 97–108)
CO2 SERPL-SCNC: 34 MMOL/L (ref 21–32)
CREAT SERPL-MCNC: 0.78 MG/DL (ref 0.55–1.02)
DIFFERENTIAL METHOD BLD: ABNORMAL
EOSINOPHIL # BLD: 0.1 K/UL (ref 0–0.4)
EOSINOPHIL NFR BLD: 2 % (ref 0–7)
ERYTHROCYTE [DISTWIDTH] IN BLOOD BY AUTOMATED COUNT: 14.2 % (ref 11.5–14.5)
GLOBULIN SER CALC-MCNC: 3.9 G/DL (ref 2–4)
GLUCOSE SERPL-MCNC: 149 MG/DL (ref 65–100)
HCT VFR BLD AUTO: 37.3 % (ref 35–47)
HGB BLD-MCNC: 11.8 G/DL (ref 11.5–16)
IMM GRANULOCYTES # BLD AUTO: 0 K/UL (ref 0–0.04)
IMM GRANULOCYTES NFR BLD AUTO: 0 % (ref 0–0.5)
LYMPHOCYTES # BLD: 2.2 K/UL (ref 0.8–3.5)
LYMPHOCYTES NFR BLD: 25 % (ref 12–49)
MCH RBC QN AUTO: 30.6 PG (ref 26–34)
MCHC RBC AUTO-ENTMCNC: 31.6 G/DL (ref 30–36.5)
MCV RBC AUTO: 96.6 FL (ref 80–99)
MONOCYTES # BLD: 1.2 K/UL (ref 0–1)
MONOCYTES NFR BLD: 13 % (ref 5–13)
NEUTS SEG # BLD: 5.5 K/UL (ref 1.8–8)
NEUTS SEG NFR BLD: 60 % (ref 32–75)
NRBC # BLD: 0 K/UL (ref 0–0.01)
NRBC BLD-RTO: 0 PER 100 WBC
PLATELET # BLD AUTO: 145 K/UL (ref 150–400)
PMV BLD AUTO: 12.8 FL (ref 8.9–12.9)
POTASSIUM SERPL-SCNC: 4.2 MMOL/L (ref 3.5–5.1)
PROT SERPL-MCNC: 7.1 G/DL (ref 6.4–8.2)
RBC # BLD AUTO: 3.86 M/UL (ref 3.8–5.2)
SODIUM SERPL-SCNC: 137 MMOL/L (ref 136–145)
WBC # BLD AUTO: 9.1 K/UL (ref 3.6–11)

## 2021-09-21 PROCEDURE — 65270000029 HC RM PRIVATE

## 2021-09-21 PROCEDURE — 74011250637 HC RX REV CODE- 250/637: Performed by: HOSPITALIST

## 2021-09-21 PROCEDURE — 36415 COLL VENOUS BLD VENIPUNCTURE: CPT

## 2021-09-21 PROCEDURE — 80053 COMPREHEN METABOLIC PANEL: CPT

## 2021-09-21 PROCEDURE — 85025 COMPLETE CBC W/AUTO DIFF WBC: CPT

## 2021-09-21 PROCEDURE — 94760 N-INVAS EAR/PLS OXIMETRY 1: CPT

## 2021-09-21 PROCEDURE — 99232 SBSQ HOSP IP/OBS MODERATE 35: CPT | Performed by: OTOLARYNGOLOGY

## 2021-09-21 PROCEDURE — 77010033678 HC OXYGEN DAILY

## 2021-09-21 PROCEDURE — 74011250637 HC RX REV CODE- 250/637: Performed by: PHYSICIAN ASSISTANT

## 2021-09-21 RX ORDER — OXYMETAZOLINE HCL 0.05 %
2 SPRAY, NON-AEROSOL (ML) NASAL
Status: DISCONTINUED | OUTPATIENT
Start: 2021-09-21 | End: 2021-09-22 | Stop reason: HOSPADM

## 2021-09-21 RX ORDER — AZITHROMYCIN 500 MG/1
500 TABLET, FILM COATED ORAL DAILY
Qty: 2 TABLET | Refills: 0 | Status: SHIPPED | OUTPATIENT
Start: 2021-09-22 | End: 2021-09-24

## 2021-09-21 RX ORDER — LEVOTHYROXINE SODIUM 75 UG/1
75 TABLET ORAL
Qty: 30 TABLET | Refills: 0 | Status: SHIPPED | OUTPATIENT
Start: 2021-09-22 | End: 2023-09-01

## 2021-09-21 RX ADMIN — FUROSEMIDE 80 MG: 40 TABLET ORAL at 08:42

## 2021-09-21 RX ADMIN — OXYCODONE 5 MG: 5 TABLET ORAL at 17:10

## 2021-09-21 RX ADMIN — PANTOPRAZOLE SODIUM 20 MG: 20 TABLET, DELAYED RELEASE ORAL at 08:42

## 2021-09-21 RX ADMIN — OXYCODONE 5 MG: 5 TABLET ORAL at 12:59

## 2021-09-21 RX ADMIN — AZITHROMYCIN MONOHYDRATE 500 MG: 500 TABLET ORAL at 08:42

## 2021-09-21 RX ADMIN — OXYCODONE 5 MG: 5 TABLET ORAL at 06:17

## 2021-09-21 RX ADMIN — TRAZODONE HYDROCHLORIDE 50 MG: 50 TABLET ORAL at 21:23

## 2021-09-21 RX ADMIN — GUAIFENESIN 600 MG: 600 TABLET, EXTENDED RELEASE ORAL at 08:42

## 2021-09-21 RX ADMIN — ATORVASTATIN CALCIUM 40 MG: 40 TABLET, FILM COATED ORAL at 08:42

## 2021-09-21 RX ADMIN — ACETAMINOPHEN 650 MG: 325 TABLET ORAL at 08:42

## 2021-09-21 RX ADMIN — OXYCODONE 5 MG: 5 TABLET ORAL at 21:24

## 2021-09-21 RX ADMIN — CYANOCOBALAMIN TAB 1000 MCG 1000 MCG: 1000 TAB at 08:42

## 2021-09-21 RX ADMIN — POTASSIUM CHLORIDE 10 MEQ: 750 TABLET, FILM COATED, EXTENDED RELEASE ORAL at 08:42

## 2021-09-21 RX ADMIN — PRAMIPEXOLE DIHYDROCHLORIDE 0.5 MG: 0.12 TABLET ORAL at 21:23

## 2021-09-21 RX ADMIN — GUAIFENESIN 600 MG: 600 TABLET, EXTENDED RELEASE ORAL at 21:23

## 2021-09-21 RX ADMIN — PAROXETINE HYDROCHLORIDE 30 MG: 20 TABLET, FILM COATED ORAL at 21:24

## 2021-09-21 RX ADMIN — LEVOTHYROXINE SODIUM 75 MCG: 0.07 TABLET ORAL at 08:42

## 2021-09-21 RX ADMIN — Medication 1000 UNITS: at 08:42

## 2021-09-21 RX ADMIN — MONTELUKAST 10 MG: 10 TABLET, FILM COATED ORAL at 08:42

## 2021-09-21 RX ADMIN — OXYCODONE 5 MG: 5 TABLET ORAL at 02:17

## 2021-09-21 RX ADMIN — ACETAMINOPHEN 650 MG: 325 TABLET ORAL at 00:27

## 2021-09-21 NOTE — DISCHARGE SUMMARY
Admit date: 9/18/2021   Admitting Provider: Amanda Tavarez MD    Discharge date: 9/21/2021  Discharging Provider: Sarah Hagen NP      * Admission Diagnoses: Bleeding from the nose [R04.0]  Anticoagulated by anticoagulation treatment [Z79.01]  Epistaxis [R04.0]    * Discharge Diagnoses:    Hospital Problems as of 9/21/2021 Date Reviewed: 3/3/2021        Codes Class Noted - Resolved POA    * (Principal) Epistaxis ICD-10-CM: R04.0  ICD-9-CM: 784.7  9/20/2021 - Present Unknown        Bleeding from the nose ICD-10-CM: R04.0  ICD-9-CM: 784.7  9/18/2021 - Present Unknown        Anticoagulated by anticoagulation treatment ICD-10-CM: Z79.01  ICD-9-CM: V58.61  9/18/2021 - Present Unknown              * Hospital Course: This is 53-year-old female admitted on 9/18/2021 with epistaxis from the left nare.  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  Patient was discontinued on her anticoagulation and had a stroke 72 hours later.  This reason patient has been on Xarelto or anticoagulation for 30+ years.  Unfortunately she developed large volume epistaxis from the left nare.  Rhino Rocket was placed with cessation of the bleeding.  Anticoagulation and aspirin have been discontinued.  ENT consult pending.  Most likely the cause was related to the patient's chronic respiratory failure with hypoxia and requiring 3 L of oxygen via nasal cannula.  She does not use humidified circuit.  Chest x-ray revealed reticular opacities and peribronchial thickening.  Continue azithromycin for sinus prophylaxis as well as possible bronchitis. * Procedures:   * No surgery found *      Consults: Hematology/Oncology and ENT    Significant Diagnostic Studies:     Discharge Exam:  Vitals reviewed. Constitutional:       Appearance: She is obese. HENT:      Nose:      Comments: Rhino Rocket left nare.   No obvious bleeding     Mouth/Throat:      Mouth: Mucous membranes are moist.   Eyes:      Conjunctiva/sclera: Conjunctivae normal.   Cardiovascular:      Rate and Rhythm: Normal rate and regular rhythm. Pulses: Normal pulses. Heart sounds: Normal heart sounds. Pulmonary:      Effort: Pulmonary effort is normal. 4L NC, CTA     Breath sounds: Normal breath sounds. Abdominal:      General: Bowel sounds are normal.   Musculoskeletal:         General: Normal range of motion. Skin:     General: Skin is warm and dry. Capillary Refill: Capillary refill takes less than 2 seconds. Neurological:      Mental Status: She is alert and oriented to person, place, and time. Mental status is at baseline. Psychiatric:         Mood and Affect: Mood normal.         Behavior: Behavior normal.     * Discharge Condition: stable  * Disposition: Home    Discharge Medications:  Current Discharge Medication List      START taking these medications    Details   sodium chloride (OCEAN) 0.65 % nasal squeeze bottle 0.1 mL by Right Nostril route every two (2) hours as needed for Congestion or Nasal Dryness for up to 30 days. Qty: 60 mL, Refills: 0  Start date: 9/21/2021, End date: 10/21/2021      azithromycin (ZITHROMAX) 500 mg tab Take 1 Tablet by mouth daily for 2 doses. Qty: 2 Tablet, Refills: 0  Start date: 9/22/2021, End date: 9/24/2021         CONTINUE these medications which have CHANGED    Details   levothyroxine (SYNTHROID) 75 mcg tablet Take 1 Tablet by mouth Daily (before breakfast) for 30 days. Qty: 30 Tablet, Refills: 0  Start date: 9/22/2021, End date: 10/22/2021         CONTINUE these medications which have NOT CHANGED    Details   furosemide (Lasix) 80 mg tablet Take 80 mg by mouth daily. PARoxetine (PaxiL) 30 mg tablet Take 30 mg by mouth nightly. pramipexole (Mirapex) 0.5 mg tablet Take 0.5 mg by mouth nightly. traZODone (DESYREL) 50 mg tablet Take 50 mg by mouth nightly. montelukast (SINGULAIR) 10 mg tablet Take 10 mg by mouth daily.       tiotropium (Spiriva with HandiHaler) 18 mcg inhalation capsule Take 1 Capsule by inhalation daily. potassium chloride SR (KLOR-CON 10) 10 mEq tablet Take 10 mEq by mouth daily. cyanocobalamin (Vitamin B-12) 1,000 mcg tablet Take 1,000 mcg by mouth daily. Omeprazole delayed release (PRILOSEC D/R) 20 mg tablet Take 20 mg by mouth daily. albuterol (PROVENTIL HFA, VENTOLIN HFA, PROAIR HFA) 90 mcg/actuation inhaler Take 1 Puff by inhalation every six (6) hours as needed for Wheezing. fexofenadine (ALLEGRA) 180 mg tablet Take 180 mg by mouth daily. cholecalciferol (Vitamin D3) (1000 Units /25 mcg) tablet Take 1,000 Units by mouth daily. atorvastatin (LIPITOR) 40 mg tablet Take 40 mg by mouth daily. aspirin delayed-release 81 mg tablet Take 81 mg by mouth daily. guaiFENesin ER (MUCINEX) 600 mg ER tablet Take 1 Tab by mouth two (2) times a day. Indications: cold symptoms, cough  Qty: 30 Tab, Refills: 0    Associated Diagnoses: Frontal sinus pain         STOP taking these medications       meloxicam (MOBIC) 15 mg tablet Comments:   Reason for Stopping:         rivaroxaban (XARELTO PO) Comments:   Reason for Stopping:         fluticasone propionate (FLONASE) 50 mcg/actuation nasal spray Comments:   Reason for Stopping:               * Follow-up Care/Patient Instructions: Activity: Activity as tolerated  Diet: Cardiac Diet  Wound Care: None needed    Patient to continue all medications as prescribed  Patient maintain all f/u appointments  Patient to hold Rosaishan Rice until restarted by hematology  Patient stable for discharge    Follow-up Information     Follow up With Specialties Details Why Contact Zo Kellogg MD Family Medicine  Please call to schedule your follow up when you are discharged.  [address]  [phone]      Geni Cedillo MD Otolaryngology In 1 week  [address] [facility] [address] [facility]      Jenna Haley MD Hematology and Oncology In 1 week  [address] [facility] [address] [address]  [phone]          Time Spent: > 35 minutes    CC: Portia Aadms MD    Signed:  Octavio Vargas NP  9/21/2021  12:08 PM

## 2021-09-21 NOTE — PROGRESS NOTES
Physician Progress Note      PATIENT:               Sonya Lin  CSN #:                  [id number]  :                       1955  ADMIT DATE:       2021 10:21 PM  [address] DATE:  RESPONDING  PROVIDER #:        Wes Ng NP [address] NP          QUERY TEXT:    Patient admitted with epistaxis and is on chronic anticoagulation. If possible, please document in the progress notes and discharge summary if you are evaluating and/or treating any of the following: The medical record reflects the following:  Risk Factors: 64 yo female with history of PE, COPD, allergic rhinitis  Clinical Indicators: patient has been on Xarelto or anticoagulation for 30+ years  Treatment: Xarelto and aspirin discontinued    Thank you,  Timothy Redmond RN, CCDS, CPC  Options provided:  -- Epistaxis associated with Xarelto. -- Bleeding unrelated to anticoagulation  -- Other - I will add my own diagnosis  -- Disagree - Not applicable / Not valid  -- Disagree - Clinically unable to determine / Unknown  -- Refer to Clinical Documentation Reviewer    PROVIDER RESPONSE TEXT:    This patient has bleeding associated with Xarelto.     Query created by: Nidia Archibald on 2021 3:26 PM      Electronically signed by:  Wes Ng NP [address] NP 2021 10:23 AM

## 2021-09-21 NOTE — PROGRESS NOTES
S:    Follow-up visit. Patient denies any further bleeding at this point    O:    Visit Vitals  BP (!) 109/59 (BP 1 Location: Left upper arm, BP Patient Position: Semi fowlers)   Pulse 69   Temp 98 °F (36.7 °C)   Resp 16   Ht 5' 7\" (1.702 m)   Wt 248 lb (112.5 kg)   SpO2 93%   Breastfeeding No   BMI 38.84 kg/m²        Left nasal packing remains in place, dried blood. Packing is deflated and gently removed  Minimal ooze noted from anterior septum no significant bleeding  Oropharynx showing minimal blood in posterior cavity no further pooling after drinking water      Recent Results (from the past 24 hour(s))   CBC WITH AUTOMATED DIFF    Collection Time: 09/21/21  6:46 AM   Result Value Ref Range    WBC 9.1 3.6 - 11.0 K/uL    RBC 3.86 3.80 - 5.20 M/uL    HGB 11.8 11.5 - 16.0 g/dL    HCT 37.3 35.0 - 47.0 %    MCV 96.6 80.0 - 99.0 FL    MCH 30.6 26.0 - 34.0 PG    MCHC 31.6 30.0 - 36.5 g/dL    RDW 14.2 11.5 - 14.5 %    PLATELET 516 (L) 852 - 400 K/uL    MPV 12.8 8.9 - 12.9 FL    NRBC 0.0 0.0  WBC    ABSOLUTE NRBC 0.00 0.00 - 0.01 K/uL    NEUTROPHILS 60 32 - 75 %    LYMPHOCYTES 25 12 - 49 %    MONOCYTES 13 5 - 13 %    EOSINOPHILS 2 0 - 7 %    BASOPHILS 0 0 - 1 %    IMMATURE GRANULOCYTES 0 0 - 0.5 %    ABS. NEUTROPHILS 5.5 1.8 - 8.0 K/UL    ABS. LYMPHOCYTES 2.2 0.8 - 3.5 K/UL    ABS. MONOCYTES 1.2 (H) 0.0 - 1.0 K/UL    ABS. EOSINOPHILS 0.1 0.0 - 0.4 K/UL    ABS. BASOPHILS 0.0 0.0 - 0.1 K/UL    ABS. IMM.  GRANS. 0.0 0.00 - 0.04 K/UL    DF AUTOMATED     METABOLIC PANEL, COMPREHENSIVE    Collection Time: 09/21/21  6:46 AM   Result Value Ref Range    Sodium 137 136 - 145 mmol/L    Potassium 4.2 3.5 - 5.1 mmol/L    Chloride 101 97 - 108 mmol/L    CO2 34 (H) 21 - 32 mmol/L    Anion gap 2 (L) 5 - 15 mmol/L    Glucose 149 (H) 65 - 100 mg/dL    BUN 17 6 - 20 mg/dL    Creatinine 0.78 0.55 - 1.02 mg/dL    BUN/Creatinine ratio 22 (H) 12 - 20      GFR est AA >60 >60 ml/min/1.73m2    GFR est non-AA >60 >60 ml/min/1.73m2 Calcium 9.3 8.5 - 10.1 mg/dL    Bilirubin, total 0.5 0.2 - 1.0 mg/dL    AST (SGOT) 27 15 - 37 U/L    ALT (SGPT) 30 12 - 78 U/L    Alk. phosphatase 54 45 - 117 U/L    Protein, total 7.1 6.4 - 8.2 g/dL    Albumin 3.2 (L) 3.5 - 5.0 g/dL    Globulin 3.9 2.0 - 4.0 g/dL    A-G Ratio 0.8 (L) 1.1 - 2.2                  A/P:    Epistaxis    Packing has been removed. Will monitor. Will need to restart her antiplatelet medications per hematology recommendations. Use nasal saline and Afrin spray as needed. We will continue to follow while inpatient. Wojciech Winston MD, [address] [facility]    [address]  [facility]    [address] [address]  [place], [place] [place] [address]

## 2021-09-22 VITALS
TEMPERATURE: 97.8 F | DIASTOLIC BLOOD PRESSURE: 59 MMHG | HEART RATE: 65 BPM | BODY MASS INDEX: 38.92 KG/M2 | OXYGEN SATURATION: 95 % | RESPIRATION RATE: 16 BRPM | HEIGHT: 67 IN | SYSTOLIC BLOOD PRESSURE: 97 MMHG | WEIGHT: 248 LBS

## 2021-09-22 LAB
ALBUMIN SERPL-MCNC: 3.1 G/DL (ref 3.5–5)
ALBUMIN/GLOB SERPL: 0.9 {RATIO} (ref 1.1–2.2)
ALP SERPL-CCNC: 54 U/L (ref 45–117)
ALT SERPL-CCNC: 32 U/L (ref 12–78)
ANION GAP SERPL CALC-SCNC: 4 MMOL/L (ref 5–15)
AST SERPL W P-5'-P-CCNC: 28 U/L (ref 15–37)
BASOPHILS # BLD: 0.1 K/UL (ref 0–0.1)
BASOPHILS NFR BLD: 1 % (ref 0–1)
BILIRUB SERPL-MCNC: 0.5 MG/DL (ref 0.2–1)
BUN SERPL-MCNC: 16 MG/DL (ref 6–20)
BUN/CREAT SERPL: 24 (ref 12–20)
CA-I BLD-MCNC: 9.1 MG/DL (ref 8.5–10.1)
CHLORIDE SERPL-SCNC: 100 MMOL/L (ref 97–108)
CO2 SERPL-SCNC: 36 MMOL/L (ref 21–32)
CREAT SERPL-MCNC: 0.68 MG/DL (ref 0.55–1.02)
DIFFERENTIAL METHOD BLD: ABNORMAL
EOSINOPHIL # BLD: 0.1 K/UL (ref 0–0.4)
EOSINOPHIL NFR BLD: 2 % (ref 0–7)
ERYTHROCYTE [DISTWIDTH] IN BLOOD BY AUTOMATED COUNT: 13.7 % (ref 11.5–14.5)
GLOBULIN SER CALC-MCNC: 3.4 G/DL (ref 2–4)
GLUCOSE SERPL-MCNC: 109 MG/DL (ref 65–100)
HCT VFR BLD AUTO: 38 % (ref 35–47)
HGB BLD-MCNC: 12 G/DL (ref 11.5–16)
IMM GRANULOCYTES # BLD AUTO: 0 K/UL (ref 0–0.04)
IMM GRANULOCYTES NFR BLD AUTO: 0 % (ref 0–0.5)
LYMPHOCYTES # BLD: 1.9 K/UL (ref 0.8–3.5)
LYMPHOCYTES NFR BLD: 30 % (ref 12–49)
MCH RBC QN AUTO: 30.3 PG (ref 26–34)
MCHC RBC AUTO-ENTMCNC: 31.6 G/DL (ref 30–36.5)
MCV RBC AUTO: 96 FL (ref 80–99)
MONOCYTES # BLD: 0.8 K/UL (ref 0–1)
MONOCYTES NFR BLD: 13 % (ref 5–13)
NEUTS SEG # BLD: 3.5 K/UL (ref 1.8–8)
NEUTS SEG NFR BLD: 54 % (ref 32–75)
NRBC # BLD: 0 K/UL (ref 0–0.01)
NRBC BLD-RTO: 0 PER 100 WBC
PLATELET # BLD AUTO: 142 K/UL (ref 150–400)
PMV BLD AUTO: 12.3 FL (ref 8.9–12.9)
POTASSIUM SERPL-SCNC: 4.2 MMOL/L (ref 3.5–5.1)
PROT SERPL-MCNC: 6.5 G/DL (ref 6.4–8.2)
RBC # BLD AUTO: 3.96 M/UL (ref 3.8–5.2)
SODIUM SERPL-SCNC: 140 MMOL/L (ref 136–145)
WBC # BLD AUTO: 6.4 K/UL (ref 3.6–11)

## 2021-09-22 PROCEDURE — 74011250637 HC RX REV CODE- 250/637: Performed by: NURSE PRACTITIONER

## 2021-09-22 PROCEDURE — 74011250637 HC RX REV CODE- 250/637: Performed by: PHYSICIAN ASSISTANT

## 2021-09-22 PROCEDURE — 80053 COMPREHEN METABOLIC PANEL: CPT

## 2021-09-22 PROCEDURE — 94640 AIRWAY INHALATION TREATMENT: CPT

## 2021-09-22 PROCEDURE — 36415 COLL VENOUS BLD VENIPUNCTURE: CPT

## 2021-09-22 PROCEDURE — 94760 N-INVAS EAR/PLS OXIMETRY 1: CPT

## 2021-09-22 PROCEDURE — 77010033678 HC OXYGEN DAILY

## 2021-09-22 PROCEDURE — 74011250637 HC RX REV CODE- 250/637: Performed by: HOSPITALIST

## 2021-09-22 PROCEDURE — 85025 COMPLETE CBC W/AUTO DIFF WBC: CPT

## 2021-09-22 RX ORDER — GUAIFENESIN 100 MG/5ML
81 LIQUID (ML) ORAL DAILY
Status: DISCONTINUED | OUTPATIENT
Start: 2021-09-22 | End: 2021-09-22 | Stop reason: HOSPADM

## 2021-09-22 RX ADMIN — MONTELUKAST 10 MG: 10 TABLET, FILM COATED ORAL at 08:32

## 2021-09-22 RX ADMIN — Medication 1000 UNITS: at 08:32

## 2021-09-22 RX ADMIN — POTASSIUM CHLORIDE 10 MEQ: 750 TABLET, FILM COATED, EXTENDED RELEASE ORAL at 08:32

## 2021-09-22 RX ADMIN — ASPIRIN 81 MG: 81 TABLET, CHEWABLE ORAL at 08:32

## 2021-09-22 RX ADMIN — LEVOTHYROXINE SODIUM 75 MCG: 0.07 TABLET ORAL at 08:32

## 2021-09-22 RX ADMIN — FUROSEMIDE 80 MG: 40 TABLET ORAL at 08:32

## 2021-09-22 RX ADMIN — PANTOPRAZOLE SODIUM 20 MG: 20 TABLET, DELAYED RELEASE ORAL at 08:32

## 2021-09-22 RX ADMIN — TIOTROPIUM BROMIDE INHALATION SPRAY 2 PUFF: 3.12 SPRAY, METERED RESPIRATORY (INHALATION) at 09:09

## 2021-09-22 RX ADMIN — ATORVASTATIN CALCIUM 40 MG: 40 TABLET, FILM COATED ORAL at 08:32

## 2021-09-22 RX ADMIN — GUAIFENESIN 600 MG: 600 TABLET, EXTENDED RELEASE ORAL at 08:32

## 2021-09-22 RX ADMIN — AZITHROMYCIN MONOHYDRATE 500 MG: 500 TABLET ORAL at 08:32

## 2021-09-22 RX ADMIN — CYANOCOBALAMIN TAB 1000 MCG 1000 MCG: 1000 TAB at 08:32

## 2021-09-22 NOTE — PROGRESS NOTES
Patient ready for discharge. All discharge instructions discussed with the patient. She verbalized understanding. All questions and concerns addressed at this time. Granddaughter to provide transportation. She is waiting at the main entrance. Patient has all personal belongings with her. Patient to be assisted to main entrance via w/c. She needs O2 provided until she can get to her vehicle. Discharge plan of care/case management plan validated with provider discharge order.

## 2021-09-22 NOTE — DISCHARGE SUMMARY
Admit date: 9/18/2021   Admitting Provider: Isreal Singh MD    Discharge date: 9/22/2021  Discharging Provider: Chela Caba NP      * Admission Diagnoses: Bleeding from the nose [R04.0]  Anticoagulated by anticoagulation treatment [Z79.01]  Epistaxis [R04.0]    * Discharge Diagnoses:    Hospital Problems as of 9/22/2021 Date Reviewed: 3/3/2021        Codes Class Noted - Resolved POA    * (Principal) Epistaxis ICD-10-CM: R04.0  ICD-9-CM: 784.7  9/20/2021 - Present Unknown        Bleeding from the nose ICD-10-CM: R04.0  ICD-9-CM: 784.7  9/18/2021 - Present Unknown        Anticoagulated by anticoagulation treatment ICD-10-CM: Z79.01  ICD-9-CM: V58.61  9/18/2021 - Present Unknown              * Hospital Course: This is 62-year-old female admitted on 9/18/2021 with epistaxis from the left nare.  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  Patient was discontinued on her anticoagulation and had a stroke 72 hours later.  This reason patient has been on Xarelto or anticoagulation for 30+ years.  Unfortunately she developed large volume epistaxis from the left nare.  Rhino Rocket was placed with cessation of the bleeding.  Anticoagulation and aspirin have been discontinued.  ENT consult pending.  Most likely the cause was related to the patient's chronic respiratory failure with hypoxia and requiring 3 L of oxygen via nasal cannula.  She does not use humidified circuit.  Chest x-ray revealed reticular opacities and peribronchial thickening.  Continue azithromycin for sinus prophylaxis as well as possible bronchitis. * Procedures:   * No surgery found *      Consults: Hematology/Oncology and ENT    Significant Diagnostic Studies:     Discharge Exam:  Vitals reviewed. Constitutional:       Appearance: She is obese.    HENT:      Nose:      Comments: No obvious bleeding     Mouth/Throat:      Mouth: Mucous membranes are moist.   Eyes:      Conjunctiva/sclera: Conjunctivae normal.   Cardiovascular: Rate and Rhythm: Normal rate and regular rhythm. Pulses: Normal pulses. Heart sounds: Normal heart sounds. Pulmonary:      Effort: Pulmonary effort is normal. 4L NC, CTA     Breath sounds: Normal breath sounds. Abdominal:      General: Bowel sounds are normal.   Musculoskeletal:         General: Normal range of motion. Skin:     General: Skin is warm and dry. Capillary Refill: Capillary refill takes less than 2 seconds. Neurological:      Mental Status: She is alert and oriented to person, place, and time. Mental status is at baseline. Psychiatric:         Mood and Affect: Mood normal.         Behavior: Behavior normal.     * Discharge Condition: stable  * Disposition: Home    Discharge Medications:  Current Discharge Medication List      START taking these medications    Details   sodium chloride (OCEAN) 0.65 % nasal squeeze bottle 0.1 mL by Right Nostril route every two (2) hours as needed for Congestion or Nasal Dryness for up to 30 days. Qty: 60 mL, Refills: 0  Start date: 9/21/2021, End date: 10/21/2021      azithromycin (ZITHROMAX) 500 mg tab Take 1 Tablet by mouth daily for 2 doses. Qty: 2 Tablet, Refills: 0  Start date: 9/22/2021, End date: 9/24/2021         CONTINUE these medications which have CHANGED    Details   levothyroxine (SYNTHROID) 75 mcg tablet Take 1 Tablet by mouth Daily (before breakfast) for 30 days. Qty: 30 Tablet, Refills: 0  Start date: 9/22/2021, End date: 10/22/2021         CONTINUE these medications which have NOT CHANGED    Details   furosemide (Lasix) 80 mg tablet Take 80 mg by mouth daily. PARoxetine (PaxiL) 30 mg tablet Take 30 mg by mouth nightly. pramipexole (Mirapex) 0.5 mg tablet Take 0.5 mg by mouth nightly. traZODone (DESYREL) 50 mg tablet Take 50 mg by mouth nightly. montelukast (SINGULAIR) 10 mg tablet Take 10 mg by mouth daily.       tiotropium (Spiriva with HandiHaler) 18 mcg inhalation capsule Take 1 Capsule by inhalation daily.      potassium chloride SR (KLOR-CON 10) 10 mEq tablet Take 10 mEq by mouth daily. cyanocobalamin (Vitamin B-12) 1,000 mcg tablet Take 1,000 mcg by mouth daily. Omeprazole delayed release (PRILOSEC D/R) 20 mg tablet Take 20 mg by mouth daily. albuterol (PROVENTIL HFA, VENTOLIN HFA, PROAIR HFA) 90 mcg/actuation inhaler Take 1 Puff by inhalation every six (6) hours as needed for Wheezing. fexofenadine (ALLEGRA) 180 mg tablet Take 180 mg by mouth daily. cholecalciferol (Vitamin D3) (1000 Units /25 mcg) tablet Take 1,000 Units by mouth daily. atorvastatin (LIPITOR) 40 mg tablet Take 40 mg by mouth daily. aspirin delayed-release 81 mg tablet Take 81 mg by mouth daily. guaiFENesin ER (MUCINEX) 600 mg ER tablet Take 1 Tab by mouth two (2) times a day. Indications: cold symptoms, cough  Qty: 30 Tab, Refills: 0    Associated Diagnoses: Frontal sinus pain         STOP taking these medications       meloxicam (MOBIC) 15 mg tablet Comments:   Reason for Stopping:         rivaroxaban (XARELTO PO) Comments:   Reason for Stopping:         fluticasone propionate (FLONASE) 50 mcg/actuation nasal spray Comments:   Reason for Stopping:               * Follow-up Care/Patient Instructions: Activity: Activity as tolerated  Diet: Cardiac Diet  Wound Care: None needed    Patient to continue all medications as prescribed  Patient maintain all f/u appointments  Patient to hold [place] until restarted by hematology  Patient stable for discharge  Patient educated to not blow nose or dig into her nose    Follow-up Information     Follow up With Specialties Details Why Contact Miah Adhikari MD Family Medicine  Please call to schedule your follow up when you are discharged.  [address] [facility]  [phone]      Sathish Boyce MD Otolaryngology On 9/29/2021 Hospital discharge follow up on September 29, 2021 at 9:00 am. [address] [facility] [address]  [phone]      Wilson Mancuso MD Hematology and Oncology On 9/27/2021 Hospital discharge appointment  on September 27, 2021 at 2:00 pm. [address] [address]  [phone]          Time Spent: > 35 minutes    CC: Damon Jerome MD    Signed:  Valery Oliva NP  9/22/2021  12:08 PM

## 2021-09-22 NOTE — PROGRESS NOTES
Hematology/Oncology   Progress Note    Patient: Randi Vasquez MRN: [id number]     YOB: 1955  Age: 63 y.o. Sex: female      Admit Date: 9/18/2021    LOS: 1 day     Chief Complaint: Admitted with epistaxis    Subjective:     Rhino packing has been removed from the left nostril. No further epistaxis noted. Constitutional No fevers, chills, night sweats, excessive fatigue or weight loss. Allergic/Immunologic No recent allergic reactions   Eyes No significant visual difficulties. No diplopia. ENMT No problems with hearing, no sore throat, no sinus drainage. Endocrine No hot flashes or night sweats. No cold intolerance, polyuria, or polydipsia   Hematologic/Lymphatic No easy bruising or bleeding. The patient denies any tender or palpable lymph nodes   Breasts No abnormal masses of breast, nipple discharge or pain. Respiratory No dyspnea on exertion, orthopnea, chest pain, cough or hemoptysis. Cardiovascular No anginal chest pain, irregular heart beat, tachycardia, palpitations or orthopnea. Gastrointestinal No nausea, vomiting, diarrhea, constipation, cramping, dysphagia, reflux, heartburn, GI bleeding, or early satiety. No change in bowel habits. Genitourinary (M) No hematuria, dysuria, increased frequency, urgency, hesitancy or incontinence. Musculoskeletal No joint pain, swelling or redness. No decreased range of motion. Integumentary No chronic rashes, inflammation, ulcerations, pruritus, petechiae, purpura, ecchymoses, or skin changes. Neurologic No headache, blurred vision, and no areas of focal weakness or numbness. Normal gait. No sensory problems. Psychiatric No insomnia, depression, shira or mood swings. No psychotropic drugs.         Current Facility-Administered Medications:     oxymetazoline (AFRIN) 0.05 % nasal spray 2 Spray, 2 Spray, Left Nostril, BID PRN, Wojciech Maciel MD    benzonatate (TESSALON) capsule 100 mg, 100 mg, Oral, TID PRN, Maximo Junaid Crisostomo MD, 100 mg at 09/20/21 0622    sodium chloride (OCEAN) 0.65 % nasal squeeze bottle 2 Spray, 2 Spray, Right Nostril, Q2H PRN, Wojciech Maciel MD    atorvastatin (LIPITOR) tablet 40 mg, 40 mg, Oral, DAILY, Yani Bearden MD, 40 mg at 09/21/21 8264    guaiFENesin ER (MUCINEX) tablet 600 mg, 600 mg, Oral, BID, Yani Bearden MD, 600 mg at 09/21/21 5037    acetaminophen (TYLENOL) tablet 650 mg, 650 mg, Oral, Q6H PRN, 650 mg at 09/21/21 0842 **OR** acetaminophen (TYLENOL) suppository 650 mg, 650 mg, Rectal, Q6H PRN, Yani Bearden MD    polyethylene glycol (MIRALAX) packet 17 g, 17 g, Oral, DAILY PRN, Yani Bearden MD    ondansetron (ZOFRAN ODT) tablet 4 mg, 4 mg, Oral, Q8H PRN **OR** ondansetron (ZOFRAN) injection 4 mg, 4 mg, IntraVENous, Q6H PRN, Yani Bearden MD    levothyroxine (SYNTHROID) tablet 75 mcg, 75 mcg, Oral, ACB, Yani Bearden MD, 75 mcg at 09/21/21 0842    pantoprazole (PROTONIX) tablet 20 mg, 20 mg, Oral, ACBYani MD, 20 mg at 09/21/21 0842    azithromycin (ZITHROMAX) tablet 500 mg, 500 mg, Oral, DAILY, Charly Walton PA-C, 500 mg at 09/21/21 9860    oxyCODONE IR (ROXICODONE) tablet 5 mg, 5 mg, Oral, Q4H PRN, Charly Walton PA-C, 5 mg at 09/21/21 1710    albuterol (PROVENTIL HFA, VENTOLIN HFA, PROAIR HFA) inhaler 1 Puff, 1 Puff, Inhalation, Q6H PRN, Charly Walton PA-C    cholecalciferol (VITAMIN D3) (1000 Units /25 mcg) tablet 1,000 Units, 1,000 Units, Oral, DAILY, Will Walton PA-C, 1,000 Units at 09/21/21 1153    cyanocobalamin tablet 1,000 mcg, 1,000 mcg, Oral, DAILY, Charly Walton PA-C, 1,000 mcg at 09/21/21 2314    furosemide (LASIX) tablet 80 mg, 80 mg, Oral, DAILY, Charly Walton PA-C, 80 mg at 09/21/21 0842    montelukast (SINGULAIR) tablet 10 mg, 10 mg, Oral, DAILY, Charly Walton PA-C, 10 mg at 09/21/21 0842    PARoxetine (PAXIL) tablet 30 mg, 30 mg, Oral, QHS, Will Walton PA-C, 30 mg at 09/20/21 2152    potassium chloride SR (KLOR-CON 10) tablet 10 mEq, 10 mEq, Oral, DAILY, Charly Walton PA-C, 10 mEq at 09/21/21 9043    pramipexole (MIRAPEX) tablet 0.5 mg, 0.5 mg, Oral, QHS, Zakiya Walton PA-C, 0.5 mg at 09/20/21 2150    tiotropium bromide (SPIRIVA RESPIMAT) 2.5 mcg /actuation, 2 Puff, Inhalation, DAILY, Charly Walton PA-C    traZODone (DESYREL) tablet 50 mg, 50 mg, Oral, QHS, Casey Blackburn PA-C, 50 mg at 09/20/21 2152     Objective:     Vitals:    09/21/21 0837 09/21/21 1125 09/21/21 1200 09/21/21 1518   BP:   (!) 109/59 110/63   Pulse:   69 68   Resp:   16 16   Temp:   98 °F (36.7 °C) 98 °F (36.7 °C)   SpO2: 92% 92% 93% 93%   Weight:       Height:              Physical Exam:   Constitutional Alert, cooperative, oriented. Mood and affect appropriate. Appears close to chronological age. Well nourished. Well developed. Head Normocephalic; no scars   Eyes Conjunctivae and sclerae are clear and without icterus. Pupils are reactive and equal.   ENMT Sinuses are nontender. No oral exudates, ulcers, masses, thrush or mucositis. Oropharynx clear. Tongue normal.   Neck Supple without masses or thyromegaly. No jugular venous distension. Hematologic/Lymphatic No petechiae or purpura. No tender or palpable lymph nodes in the cervical, supraclavicular, axillary or inguinal area. Respiratory Lungs are clear to auscultation without rhonchi or wheezing. Cardiovascular Regular rate and rhythm of heart without murmurs, gallops or rubs. Chest / Line Site Chest is symmetric with no chest wall deformities. Abdomen Non-tender, non-distended, no masses, ascites or hepatosplenomegaly. Good bowel sounds. No guarding or rebound tenderness. No pulsatile masses. Musculoskeletal No tenderness or swelling, normal range of motion without obvious weakness. Extremities No visible deformities, no cyanosis, clubbing or edema.     Skin No rashes, scars, or lesions suggestive of malignancy. No petechiae, purpura, or ecchymoses. No excoriations. Neurologic No sensory or motor deficits, normal cerebellar function, normal gait, cranial nerves intact. Psychiatric Alert and oriented times three. Coherent speech. Verbalizes understanding of our discussions today. Lab/Data Review:  Recent Labs     09/21/21  0646 09/20/21  0645 09/19/21 0923   WBC 9.1 8.8 6.6   HGB 11.8 12.1 11.9   HCT 37.3 39.4 36.8   * 140* 142*     Recent Labs     09/21/21  0646 09/20/21  0645 09/19/21  0923 09/18/21 2215 09/18/21 2215    142 141   < > 140   K 4.2 4.3 3.9   < > 3.7    106 105   < > 100   CO2 34* 35* 32   < > 28   * 124* 120*   < > 105*   BUN 17 20 30*   < > 33*   CREA 0.78 0.75 0.85   < > 1.38*   CA 9.3 9.4 9.1   < > 9.4   ALB 3.2* 3.4*  --   --   --    TBILI 0.5 0.5  --   --   --    ALT 30 35  --   --   --    INR  --   --   --   --  1.2*    < > = values in this interval not displayed. No results for input(s): PH, PCO2, PO2, HCO3, FIO2 in the last 72 hours. Recent Results (from the past 24 hour(s))   CBC WITH AUTOMATED DIFF    Collection Time: 09/21/21  6:46 AM   Result Value Ref Range    WBC 9.1 3.6 - 11.0 K/uL    RBC 3.86 3.80 - 5.20 M/uL    HGB 11.8 11.5 - 16.0 g/dL    HCT 37.3 35.0 - 47.0 %    MCV 96.6 80.0 - 99.0 FL    MCH 30.6 26.0 - 34.0 PG    MCHC 31.6 30.0 - 36.5 g/dL    RDW 14.2 11.5 - 14.5 %    PLATELET 655 (L) 320 - 400 K/uL    MPV 12.8 8.9 - 12.9 FL    NRBC 0.0 0.0  WBC    ABSOLUTE NRBC 0.00 0.00 - 0.01 K/uL    NEUTROPHILS 60 32 - 75 %    LYMPHOCYTES 25 12 - 49 %    MONOCYTES 13 5 - 13 %    EOSINOPHILS 2 0 - 7 %    BASOPHILS 0 0 - 1 %    IMMATURE GRANULOCYTES 0 0 - 0.5 %    ABS. NEUTROPHILS 5.5 1.8 - 8.0 K/UL    ABS. LYMPHOCYTES 2.2 0.8 - 3.5 K/UL    ABS. MONOCYTES 1.2 (H) 0.0 - 1.0 K/UL    ABS. EOSINOPHILS 0.1 0.0 - 0.4 K/UL    ABS. BASOPHILS 0.0 0.0 - 0.1 K/UL    ABS. IMM.  GRANS. 0.0 0.00 - 0.04 K/UL    DF AUTOMATED     METABOLIC PANEL, COMPREHENSIVE    Collection Time: 09/21/21  6:46 AM   Result Value Ref Range    Sodium 137 136 - 145 mmol/L    Potassium 4.2 3.5 - 5.1 mmol/L    Chloride 101 97 - 108 mmol/L    CO2 34 (H) 21 - 32 mmol/L    Anion gap 2 (L) 5 - 15 mmol/L    Glucose 149 (H) 65 - 100 mg/dL    BUN 17 6 - 20 mg/dL    Creatinine 0.78 0.55 - 1.02 mg/dL    BUN/Creatinine ratio 22 (H) 12 - 20      GFR est AA >60 >60 ml/min/1.73m2    GFR est non-AA >60 >60 ml/min/1.73m2    Calcium 9.3 8.5 - 10.1 mg/dL    Bilirubin, total 0.5 0.2 - 1.0 mg/dL    AST (SGOT) 27 15 - 37 U/L    ALT (SGPT) 30 12 - 78 U/L    Alk. phosphatase 54 45 - 117 U/L    Protein, total 7.1 6.4 - 8.2 g/dL    Albumin 3.2 (L) 3.5 - 5.0 g/dL    Globulin 3.9 2.0 - 4.0 g/dL    A-G Ratio 0.8 (L) 1.1 - 2.2          Radiology:   CT Results  (Last 48 hours)    None           Assessment and Plan:     Principal Problem:    Epistaxis (9/20/2021)    Active Problems:    Bleeding from the nose (9/18/2021)      Anticoagulated by anticoagulation treatment (9/18/2021)    Epistaxis;  -Significant epistaxis from the left nostril in the setting of long-term anticoagulation for hypercoagulable state.  -Anticoagulation including antiplatelet therapy is rightly being held. -There is been about a 2 g/dL fall in hemoglobin since admission, but appears to be stabilizing.  -Rhino packing has been removed without any evidence of further epistaxis.     Hypercoagulable state:  -Reportedly diagnosed with unprovoked pulmonary embolism over 30 years ago and has been on long-term anticoagulation since that time. -Most recently on Xarelto 20 mg daily and aspirin 81 mg daily.   Both of these medications have been held due to admission with significant epistaxis.  -Options include restarting aspirin 81 mg p.o. daily for now.  -We could consider restarting Xarelto at a lower dose of 10 mg per daily in a week, provided the patient does not have any further bleeding.     History of TIA:  -Reportedly diagnosed in 2015 when Xarelto was held for 3 days for breast surgery.  -Details are unclear at this time.  -As noted above we will consider starting aspirin 81 mg p.o. daily, if no further bleeding after packing is removed tomorrow.     Right breast DCIS:  -Diagnosed in 2015, status post surgery and radiation therapy.  -Could not tolerate antiestrogen therapy. Patient has an appointment with Dr. Mara Meade on 9/27/2021.       Signed By: Ramo Cisneros MD     September 21, 2021

## 2021-09-22 NOTE — DISCHARGE INSTRUCTIONS
Patient Education        Nosebleeds: Care Instructions  Your Care Instructions     Nosebleeds are common, especially if you have colds or allergies. Many things can cause a nosebleed. Some nosebleeds stop on their own with pressure. Others need packing. Some get cauterized (sealed). If you have gauze or other packing materials in your nose, you will need to follow up with your doctor to have the packing removed. You may need more treatment if you get nosebleeds a lot. The doctor has checked you carefully, but problems can develop later. If you notice any problems or new symptoms, get medical treatment right away. Follow-up care is a key part of your treatment and safety. Be sure to make and go to all appointments, and call your doctor if you are having problems. It's also a good idea to know your test results and keep a list of the medicines you take. How can you care for yourself at home? · If you get another nosebleed:  ? Sit up and tilt your head slightly forward. This keeps blood from going down your throat. ? Use your thumb and index finger to pinch your nose shut for 10 minutes. Use a clock. Do not check to see if the bleeding has stopped before the 10 minutes are up. If the bleeding has not stopped, pinch your nose shut for another 10 minutes. ? When the bleeding has stopped, try not to pick, rub, or blow your nose for 12 hours. Avoiding these things helps keep your nose from bleeding again. · If your doctor prescribed antibiotics, take them as directed. Do not stop taking them just because you feel better. You need to take the full course of antibiotics. To prevent nosebleeds  · Do not blow your nose too hard. · Try not to lift or strain after a nosebleed. · Raise your head on a pillow while you sleep. · Put a thin layer of a saline- or water-based nasal gel, such as NasoGel, inside your nose. Put it on the septum, which divides your nostrils.  This will prevent dryness that can cause nosebleeds. · Use a vaporizer or humidifier to add moisture to your bedroom. Follow the directions for cleaning the machine. · Do not use aspirin, ibuprofen (Advil, Motrin), or naproxen (Aleve) for 36 to 48 hours after a nosebleed unless your doctor tells you to. You can use acetaminophen (Tylenol) for pain relief. · Talk to your doctor about stopping any other medicines you are taking. Some medicines may make you more likely to get a nosebleed. · Do not use cold medicines or nasal sprays without first talking to your doctor. They can make your nose dry. When should you call for help? Call 911 anytime you think you may need emergency care. For example, call if:    · You passed out (lost consciousness). Call your doctor now or seek immediate medical care if:    · You get another nosebleed and your nose is still bleeding after you have applied pressure 3 times for 10 minutes each time (30 minutes total).     · There is a lot of blood running down the back of your throat even after you pinch your nose and tilt your head forward.     · You have a fever.     · You have sinus pain. Watch closely for changes in your health, and be sure to contact your doctor if:    · You get nosebleeds often, even if they stop.     · You do not get better as expected. Where can you learn more? Go to http://www.carcamo.com/  Enter S156 in the search box to learn more about \"Nosebleeds: Care Instructions. \"  Current as of: July 1, 2021               Content Version: 13.0  © 2006-2021 Healthwise, Incorporated. Care instructions adapted under license by [employer] (which disclaims liability or warranty for this information). If you have questions about a medical condition or this instruction, always ask your healthcare professional. [place] [address] any warranty or liability for your use of this information.          Patient Education        Nosebleeds: Care Instructions  Your Care Instructions     Nosebleeds are common, especially if you have colds or allergies. Many things can cause a nosebleed. Some nosebleeds stop on their own with pressure. Others need packing. Some get cauterized (sealed). If you have gauze or other packing materials in your nose, you will need to follow up with your doctor to have the packing removed. You may need more treatment if you get nosebleeds a lot. The doctor has checked you carefully, but problems can develop later. If you notice any problems or new symptoms, get medical treatment right away. Follow-up care is a key part of your treatment and safety. Be sure to make and go to all appointments, and call your doctor if you are having problems. It's also a good idea to know your test results and keep a list of the medicines you take. How can you care for yourself at home? · If you get another nosebleed:  ? Sit up and tilt your head slightly forward. This keeps blood from going down your throat. ? Use your thumb and index finger to pinch your nose shut for 10 minutes. Use a clock. Do not check to see if the bleeding has stopped before the 10 minutes are up. If the bleeding has not stopped, pinch your nose shut for another 10 minutes. ? When the bleeding has stopped, try not to pick, rub, or blow your nose for 12 hours. Avoiding these things helps keep your nose from bleeding again. · If your doctor prescribed antibiotics, take them as directed. Do not stop taking them just because you feel better. You need to take the full course of antibiotics. To prevent nosebleeds  · Do not blow your nose too hard. · Try not to lift or strain after a nosebleed. · Raise your head on a pillow while you sleep. · Put a thin layer of a saline- or water-based nasal gel, such as NasoGel, inside your nose. Put it on the septum, which divides your nostrils. This will prevent dryness that can cause nosebleeds.   · Use a vaporizer or humidifier to add moisture to your bedroom. Follow the directions for cleaning the machine. · Do not use aspirin, ibuprofen (Advil, Motrin), or naproxen (Aleve) for 36 to 48 hours after a nosebleed unless your doctor tells you to. You can use acetaminophen (Tylenol) for pain relief. · Talk to your doctor about stopping any other medicines you are taking. Some medicines may make you more likely to get a nosebleed. · Do not use cold medicines or nasal sprays without first talking to your doctor. They can make your nose dry. When should you call for help? Call 911 anytime you think you may need emergency care. For example, call if:    · You passed out (lost consciousness). Call your doctor now or seek immediate medical care if:    · You get another nosebleed and your nose is still bleeding after you have applied pressure 3 times for 10 minutes each time (30 minutes total).     · There is a lot of blood running down the back of your throat even after you pinch your nose and tilt your head forward.     · You have a fever.     · You have sinus pain. Watch closely for changes in your health, and be sure to contact your doctor if:    · You get nosebleeds often, even if they stop.     · You do not get better as expected. Where can you learn more? Go to http://www.carcamo.com/  Enter S156 in the search box to learn more about \"Nosebleeds: Care Instructions. \"  Current as of: July 1, 2021               Content Version: 13.0  © 0689-9279 [employer]. Care instructions adapted under license by [employer] (which disclaims liability or warranty for this information). If you have questions about a medical condition or this instruction, always ask your healthcare professional. Tito Ng any warranty or liability for your use of this information.        Patient Education   Ã¯Â»Â¿  Anticoagulants: After Your Visit  Your Care Instructions  Your doctor prescribed an anticoagulant medicine. Anticoagulants, often called blood thinners, prevent new blood clots from forming and keep existing clots from getting larger. They do not actually thin the blood, but they make the blood take longer to clot. This lowers the risk of a blood clot moving to the lungs (pulmonary embolism) or moving to the brain and causing a stroke. Blood thinners come in two forms. Heparin is given by shot, either under your skin or through a needle in your vein, and starts working right away. Warfarin (Coumadin) comes in pill form and takes longer to work. Your doctor may have you begin taking both forms at the same time. As soon as the pills start to work, you will stop the shots but continue to take the pills. If you have a blood clot in your leg, you may need to take warfarin for several months. People who have heart conditions such as atrial fibrillation often need to take it for the rest of their lives. The right dose of this medicine is different for each person. You will need regular blood tests to see if your dose is correct. Follow-up care is a key part of your treatment and safety. Be sure to make and go to all appointments, and call your doctor if you are having problems. Itâs also a good idea to know your test results and keep a list of the medicines you take. How do you take blood thinners? · Take your medicines exactly as prescribed. Call your doctor if you think you are having a problem with your medicine. · Call your doctor if you are not sure what to do if you missed a dose of blood thinner. ¨ Your doctor can tell you exactly what to do so you do not take too much or too little blood thinner. Then you will be as safe as possible. · Some general rules for what to do if you miss a dose:  ¨ If you remember it in the same day, take the missed dose. Then go back to your regular schedule. ¨ If it is the next day, or almost time to take the next dose, do not take the missed dose.  Do not double the dose to make up for the missed one. At your next regularly scheduled time, take your normal dose. ¨ If you miss your dose for 2 or more days, call your doctor. · To help you stay on schedule, use a calendar to remind you when to take your blood thinner. When you take the medicine, note it on the calendar. · If you are going to give yourself shots, your doctor will give you instructions for how to safely inject the medicine. Follow the directions carefully. · Do not take any vitamins, over-the-counter medicines, or herbal products without talking to your doctor first.  · Avoid contact sports and other activities that could lead to injury. Make your home safe and take other measures to reduce your risk of falling. Always wear a seat belt while in a car. · Do not suddenly change your intake of vitamin Kârich foods, such as broccoli, cabbage, asparagus, lettuce, and spinach. This will help blood thinners work evenly from day to day. · Limit alcohol to 2 drinks a day for men and 1 drink a day for women. Alcohol may interfere with blood thinners. It also increases your risk of falls, which can cause bruising and bleeding. · Tell your dentist, pharmacist, and other health professionals that you take blood thinners. Wear medical alert jewelry that says you take blood thinners. You can buy this at most drugstores. When should you call for help? Call 911 anytime you think you may need emergency care. For example, call if:  · You cough up blood. · You vomit blood or what looks like coffee grounds. · You pass maroon or very bloody stools. Call your doctor now or seek immediate medical care if:  · You have new bruises or blood spots under your skin. · You have a nosebleed. · Your gums bleed when you brush your teeth. · You have blood in your urine. · Your stools are black and tarlike or have streaks of blood. · You have vaginal bleeding when you are not having your period, or heavy period bleeding.   Watch closely for changes in your health, and be sure to contact your doctor if:  · You have questions about your medicine. Where can you learn more? Go to DealShowClixer.be  Enter H150 in the search box to learn more about \"Anticoagulants: After Your Visit. \"   Â© 0180-5395 Healthwise, Incorporated. Care instructions adapted under license by [employer] (which disclaims liability or warranty for this information). This care instruction is for use with your licensed healthcare professional. If you have questions about a medical condition or this instruction, always ask your healthcare professional. [place] [address] any warranty or liability for your use of this information. Content Version: 3.2.26169; Last Revised: July 1, 2009   Patient to continue all medications as prescribed  Patient maintain all f/u appointments  Patient to hold Kinsey Calderon until restarted by hematology  Patient stable for discharge    Follow-up Information     Follow up With Specialties Details Why Contact Soledad uDrham MD Family Medicine  Please call to schedule your follow up when you are discharged.  [address]  [phone]      Laine Jarvis MD Otolaryngology In 1 week  [address] [facility] [address] [facility] [address] [facility]      Bette Rowe MD Hematology and Oncology In 1 week  [address] [facility] [address] [facility] [id number] [phone]          Time Spent: > 35 minutes    CC: Delicia Esteves MD

## 2021-09-29 ENCOUNTER — OFFICE VISIT (OUTPATIENT)
Dept: ENT CLINIC | Age: 66
End: 2021-09-29
Payer: MEDICARE

## 2021-09-29 VITALS
BODY MASS INDEX: 38.92 KG/M2 | HEIGHT: 67 IN | RESPIRATION RATE: 18 BRPM | HEART RATE: 75 BPM | SYSTOLIC BLOOD PRESSURE: 120 MMHG | OXYGEN SATURATION: 93 % | DIASTOLIC BLOOD PRESSURE: 72 MMHG | WEIGHT: 248 LBS | TEMPERATURE: 98.3 F

## 2021-09-29 DIAGNOSIS — Z79.01 ANTICOAGULATED BY ANTICOAGULATION TREATMENT: Primary | ICD-10-CM

## 2021-09-29 DIAGNOSIS — R04.0 EPISTAXIS: ICD-10-CM

## 2021-09-29 DIAGNOSIS — Z99.81 OXYGEN DEPENDENT: ICD-10-CM

## 2021-09-29 PROCEDURE — 31231 NASAL ENDOSCOPY DX: CPT | Performed by: OTOLARYNGOLOGY

## 2021-09-29 PROCEDURE — G9899 SCRN MAM PERF RSLTS DOC: HCPCS | Performed by: OTOLARYNGOLOGY

## 2021-09-29 PROCEDURE — G8432 DEP SCR NOT DOC, RNG: HCPCS | Performed by: OTOLARYNGOLOGY

## 2021-09-29 PROCEDURE — 1111F DSCHRG MED/CURRENT MED MERGE: CPT | Performed by: OTOLARYNGOLOGY

## 2021-09-29 PROCEDURE — 99214 OFFICE O/P EST MOD 30 MIN: CPT | Performed by: OTOLARYNGOLOGY

## 2021-09-29 PROCEDURE — 3017F COLORECTAL CA SCREEN DOC REV: CPT | Performed by: OTOLARYNGOLOGY

## 2021-09-29 PROCEDURE — G8419 CALC BMI OUT NRM PARAM NOF/U: HCPCS | Performed by: OTOLARYNGOLOGY

## 2021-09-29 PROCEDURE — G8427 DOCREV CUR MEDS BY ELIG CLIN: HCPCS | Performed by: OTOLARYNGOLOGY

## 2021-09-29 RX ORDER — ESCITALOPRAM OXALATE 20 MG/1
TABLET ORAL
COMMUNITY
Start: 2021-07-27 | End: 2022-08-31

## 2021-09-29 RX ORDER — RIVAROXABAN 10 MG/1
10 TABLET, FILM COATED ORAL DAILY
COMMUNITY
Start: 2021-09-27

## 2021-09-29 RX ORDER — POTASSIUM CHLORIDE 750 MG/1
CAPSULE, EXTENDED RELEASE ORAL
COMMUNITY
Start: 2021-08-23 | End: 2022-08-31

## 2021-09-29 NOTE — LETTER
9/29/2021    Patient: Jet Harris   YOB: 1955   Date of Visit: 9/29/2021     [place] Nose, MD  [address] [facility] [address]  Via Fax: [phone]    Dear Javier Bueno MD,      Thank you for referring Ms. Edward to [facility] [address], [facility] for evaluation. My notes for this consultation are attached. If you have questions, please do not hesitate to call me. I look forward to following your patient along with you.       Sincerely,    Stacey Prescott MD

## 2021-09-29 NOTE — PROGRESS NOTES
Subjective:    Danny Arcos   63 y.o.   1955     Followup Visit    Location -nose    Quality -epistaxis    Severity -severe    Duration -10 days    Timing -intermittent    Context -patient following up from hospitalization she was discharged on 1 week ago. She had left nasal packing done was hospitalized due to blood loss and oxygen requirements. Since discharge her Xarelto has been cut down to 10 mg. She has not had bleeding since being home. She is using humidified oxygen and using saline. She admits to manipulating the left nostril with her finger    Modifying Features -none    Associated symptoms/signs -none      Review of Systems  Review of Systems   Constitutional: Negative for chills and fever. HENT: Positive for nosebleeds. Negative for ear pain, hearing loss and tinnitus. Eyes: Negative for blurred vision and double vision. Respiratory: Positive for shortness of breath. Negative for cough and sputum production. Cardiovascular: Negative for chest pain and palpitations. Gastrointestinal: Negative for heartburn, nausea and vomiting. Musculoskeletal: Negative for joint pain and neck pain. Skin: Negative. Neurological: Negative for dizziness, speech change, weakness and headaches. Endo/Heme/Allergies: Negative for environmental allergies. Bruises/bleeds easily. Psychiatric/Behavioral: Negative for memory loss. The patient does not have insomnia. Past Medical History:   Diagnosis Date    [facility] ([facility] [address]     Chronic obstructive pulmonary disease ([facility] [address]     Stroke (cerebrum) ([facility] [address] 2015     Prior to Admission medications    Medication Sig Start Date End Date Taking?  Authorizing Provider   Xarelto 10 mg tablet  9/27/21   Provider, Historical   potassium chloride SA (MICRO-K) 10 mEq capsule  8/23/21   Provider, Historical   escitalopram oxalate (LEXAPRO) 20 mg tablet  7/27/21   Provider, Historical   levothyroxine (SYNTHROID) 75 mcg tablet Take 1 Tablet by mouth Daily (before breakfast) for 30 days. 9/22/21 10/22/21  Cherise Laughlin NP   sodium chloride (OCEAN) 0.65 % nasal squeeze bottle 0.1 mL by Right Nostril route every two (2) hours as needed for Congestion or Nasal Dryness for up to 30 days. 9/21/21 10/21/21  Cherise Laughlin NP   furosemide (Lasix) 80 mg tablet Take 80 mg by mouth daily. Provider, Historical   PARoxetine (PaxiL) 30 mg tablet Take 30 mg by mouth nightly. Provider, Historical   pramipexole (Mirapex) 0.5 mg tablet Take 0.5 mg by mouth nightly. Provider, Historical   traZODone (DESYREL) 50 mg tablet Take 50 mg by mouth nightly. Provider, Historical   montelukast (SINGULAIR) 10 mg tablet Take 10 mg by mouth daily. Provider, Historical   tiotropium (Spiriva with HandiHaler) 18 mcg inhalation capsule Take 1 Capsule by inhalation daily. Provider, Historical   potassium chloride SR (KLOR-CON 10) 10 mEq tablet Take 10 mEq by mouth daily. Provider, Historical   cyanocobalamin (Vitamin B-12) 1,000 mcg tablet Take 1,000 mcg by mouth daily. Provider, Historical   Omeprazole delayed release (PRILOSEC D/R) 20 mg tablet Take 20 mg by mouth daily. Provider, Historical   albuterol (PROVENTIL HFA, VENTOLIN HFA, PROAIR HFA) 90 mcg/actuation inhaler Take 1 Puff by inhalation every six (6) hours as needed for Wheezing. Provider, Historical   fexofenadine (ALLEGRA) 180 mg tablet Take 180 mg by mouth daily. Provider, Historical   cholecalciferol (Vitamin D3) (1000 Units /25 mcg) tablet Take 1,000 Units by mouth daily. Provider, Historical   atorvastatin (LIPITOR) 40 mg tablet Take 40 mg by mouth daily. 8/20/21   OtherRose MD   aspirin delayed-release 81 mg tablet Take 81 mg by mouth daily. Rose Myers MD   guaiFENesin ER (MUCINEX) 600 mg ER tablet Take 1 Tab by mouth two (2) times a day.  Indications: cold symptoms, cough 3/3/21   Sonya Diaz NP            Objective:     Visit Vitals  /72 (BP 1 Location: Left upper arm, BP Patient Position: Sitting, BP Cuff Size: Adult)   Pulse 75   Temp 98.3 °F (36.8 °C) (Temporal)   Resp 18   Ht 5' 7\" (1.702 m)   Wt 248 lb (112.5 kg)   SpO2 93%   BMI 38.84 kg/m²        Physical Exam  Vitals reviewed. Constitutional:       General: She is awake. She is not in acute distress. Appearance: Normal appearance. She is well-groomed. She is obese. Interventions: Nasal cannula in place. HENT:      Head: Normocephalic and atraumatic. Jaw: There is normal jaw occlusion. No trismus, tenderness or malocclusion. Salivary Glands: Right salivary gland is not diffusely enlarged or tender. Left salivary gland is not diffusely enlarged or tender. Right Ear: Tympanic membrane, ear canal and external ear normal. Decreased hearing noted. Left Ear: Tympanic membrane, ear canal and external ear normal. Decreased hearing noted. Ears:      Hardwick exam findings: does not lateralize. Right Rinne: AC > BC. Left Rinne: AC > BC. Comments: Hearing aids     Nose: Septal deviation and mucosal edema present. No nasal deformity. Right Turbinates: Not enlarged, swollen or pale. Left Turbinates: Not enlarged, swollen or pale. Right Sinus: No maxillary sinus tenderness or frontal sinus tenderness. Left Sinus: No maxillary sinus tenderness or frontal sinus tenderness. Comments: Dense crusting and ulceration to the left nasal septum anteriorly     Mouth/Throat:      Lips: Pink. No lesions. Mouth: Mucous membranes are moist. No oral lesions. Dentition: Normal dentition. No dental caries. Tongue: No lesions. Palate: No mass and lesions. Pharynx: Oropharynx is clear. Uvula midline. No oropharyngeal exudate or posterior oropharyngeal erythema. Tonsils: No tonsillar exudate. 0 on the right. 0 on the left. Eyes:      General: Vision grossly intact. Extraocular Movements: Extraocular movements intact. Right eye: No nystagmus. Left eye: No nystagmus. Conjunctiva/sclera: Conjunctivae normal.      Pupils: Pupils are equal, round, and reactive to light. Neck:      Thyroid: No thyroid mass, thyromegaly or thyroid tenderness. Trachea: Trachea and phonation normal. No tracheal tenderness or tracheal deviation. Cardiovascular:      Rate and Rhythm: Normal rate and regular rhythm. Pulmonary:      Effort: Pulmonary effort is normal. No respiratory distress. Breath sounds: No stridor. Musculoskeletal:         General: No swelling or tenderness. Normal range of motion. Cervical back: No edema or erythema. Lymphadenopathy:      Cervical: No cervical adenopathy. Skin:     General: Skin is warm and dry. Findings: No lesion or rash. Neurological:      General: No focal deficit present. Mental Status: She is alert and oriented to person, place, and time. Mental status is at baseline. Cranial Nerves: Cranial nerves are intact. Coordination: Romberg sign negative. Gait: Gait is intact. Psychiatric:         Mood and Affect: Mood normal.         Behavior: Behavior normal. Behavior is cooperative. Procedure: Nasal endoscopy - Nasal passage is anesthetized with topical lidocaine/oxymetazoline spray. After several minutes the fiberoptic scope is used to examine the nasal cavity. Findings:    Septum -anterior superior deviation left; ulceration with crusting anterior    Turbinates -edematous    Middle meatus -mucoid discharge    Nasopharynx -normal eustachian tubes, no adenoidal tissue    No other sign or source of epistaxis      Assessment/Plan:     Encounter Diagnoses   Name Primary?  Anticoagulated by anticoagulation treatment Yes    Epistaxis     Oxygen dependent      Bleeding has improved since packing. No other site noted. I highly suspect this coming from the ulceration and crusting from the anterior septum. Exacerbated by high-dose blood thinner.   We stressed the use of humidified oxygen, nasal saline and Ayr gel, handout given. Xarelto has been cut in half also. Follow-up in 1 month or sooner if needed    Orders Placed This Encounter    NASAL ENDOSCOPY,DX    Xarelto 10 mg tablet    potassium chloride SA (MICRO-K) 10 mEq capsule    escitalopram oxalate (LEXAPRO) 20 mg tablet           Wojciech Murray MD, [address] [facility]    [address]  [place] [id number] . [id number]  [place], [place] [address] [place] [phone]

## 2021-11-22 ENCOUNTER — OFFICE VISIT (OUTPATIENT)
Dept: ENT CLINIC | Age: 66
End: 2021-11-22
Payer: MEDICARE

## 2021-11-22 VITALS
OXYGEN SATURATION: 97 % | RESPIRATION RATE: 18 BRPM | DIASTOLIC BLOOD PRESSURE: 74 MMHG | BODY MASS INDEX: 38.92 KG/M2 | HEIGHT: 67 IN | HEART RATE: 70 BPM | WEIGHT: 248 LBS | SYSTOLIC BLOOD PRESSURE: 122 MMHG | TEMPERATURE: 98.1 F

## 2021-11-22 DIAGNOSIS — R04.0 EPISTAXIS: ICD-10-CM

## 2021-11-22 DIAGNOSIS — Z79.01 ANTICOAGULATED BY ANTICOAGULATION TREATMENT: Primary | ICD-10-CM

## 2021-11-22 DIAGNOSIS — J34.0: ICD-10-CM

## 2021-11-22 DIAGNOSIS — Z99.81 OXYGEN DEPENDENT: ICD-10-CM

## 2021-11-22 PROCEDURE — G9899 SCRN MAM PERF RSLTS DOC: HCPCS | Performed by: OTOLARYNGOLOGY

## 2021-11-22 PROCEDURE — 99214 OFFICE O/P EST MOD 30 MIN: CPT | Performed by: OTOLARYNGOLOGY

## 2021-11-22 PROCEDURE — G8432 DEP SCR NOT DOC, RNG: HCPCS | Performed by: OTOLARYNGOLOGY

## 2021-11-22 PROCEDURE — 3017F COLORECTAL CA SCREEN DOC REV: CPT | Performed by: OTOLARYNGOLOGY

## 2021-11-22 PROCEDURE — G8417 CALC BMI ABV UP PARAM F/U: HCPCS | Performed by: OTOLARYNGOLOGY

## 2021-11-22 PROCEDURE — G8427 DOCREV CUR MEDS BY ELIG CLIN: HCPCS | Performed by: OTOLARYNGOLOGY

## 2021-11-22 NOTE — PROGRESS NOTES
Visit Vitals  /74 (BP 1 Location: Left upper arm, BP Patient Position: Sitting, BP Cuff Size: Adult)   Pulse 70   Temp 98.1 °F (36.7 °C) (Temporal)   Resp 18   Ht 5' 7\" (1.702 m)   Wt 248 lb (112.5 kg)   SpO2 97%   BMI 38.84 kg/m²     Chief Complaint   Patient presents with    Follow-up     Epistaxis

## 2021-11-22 NOTE — PROGRESS NOTES
Subjective:    Curt Arcos   63 y.o.   1955     Followup Visit    Location -nose    Quality -epistaxis    Severity -severe    Duration -10 days    Timing -intermittent    Context -patient following up from hospitalization she was discharged on 1 week ago. She had left nasal packing done was hospitalized due to blood loss and oxygen requirements. Since discharge her Xarelto has been cut down to 10 mg. She has not had bleeding since being home. She is using humidified oxygen and using saline. She admits to manipulating the left nostril with her finger    Modifying Features -none    Associated symptoms/signs -none    11/22/2021 -1 month follow-up, she is overall doing better. No further bleeding from the nose, continues on her routine for nasal moisturization and remains on a lower dose of the Xarelto. She states that the left side is feeling more blocked than usual.    Review of Systems  Review of Systems   Constitutional: Negative for chills and fever. HENT: Positive for nosebleeds. Negative for ear pain, hearing loss and tinnitus. Eyes: Negative for blurred vision and double vision. Respiratory: Positive for shortness of breath. Negative for cough and sputum production. Cardiovascular: Negative for chest pain and palpitations. Gastrointestinal: Negative for heartburn, nausea and vomiting. Musculoskeletal: Negative for joint pain and neck pain. Skin: Negative. Neurological: Negative for dizziness, speech change, weakness and headaches. Endo/Heme/Allergies: Negative for environmental allergies. Bruises/bleeds easily. Psychiatric/Behavioral: Negative for memory loss. The patient does not have insomnia. Past Medical History:   Diagnosis Date    [facility] ([facility] [address]     Chronic obstructive pulmonary disease ([facility] [address]     Stroke (cerebrum) ([facility] [address] 2015     Prior to Admission medications    Medication Sig Start Date End Date Taking?  Authorizing Provider   Xarelto 10 mg tablet  9/27/21   Provider, Historical   potassium chloride SA (MICRO-K) 10 mEq capsule  8/23/21   Provider, Historical   escitalopram oxalate (LEXAPRO) 20 mg tablet  7/27/21   Provider, Historical   furosemide (Lasix) 80 mg tablet Take 80 mg by mouth daily. Provider, Historical   PARoxetine (PaxiL) 30 mg tablet Take 30 mg by mouth nightly. Provider, Historical   pramipexole (Mirapex) 0.5 mg tablet Take 0.5 mg by mouth nightly. Provider, Historical   traZODone (DESYREL) 50 mg tablet Take 50 mg by mouth nightly. Provider, Historical   montelukast (SINGULAIR) 10 mg tablet Take 10 mg by mouth daily. Provider, Historical   tiotropium (Spiriva with HandiHaler) 18 mcg inhalation capsule Take 1 Capsule by inhalation daily. Provider, Historical   potassium chloride SR (KLOR-CON 10) 10 mEq tablet Take 10 mEq by mouth daily. Provider, Historical   cyanocobalamin (Vitamin B-12) 1,000 mcg tablet Take 1,000 mcg by mouth daily. Provider, Historical   Omeprazole delayed release (PRILOSEC D/R) 20 mg tablet Take 20 mg by mouth daily. Provider, Historical   albuterol (PROVENTIL HFA, VENTOLIN HFA, PROAIR HFA) 90 mcg/actuation inhaler Take 1 Puff by inhalation every six (6) hours as needed for Wheezing. Provider, Historical   fexofenadine (ALLEGRA) 180 mg tablet Take 180 mg by mouth daily. Provider, Historical   cholecalciferol (Vitamin D3) (1000 Units /25 mcg) tablet Take 1,000 Units by mouth daily. Provider, Historical   atorvastatin (LIPITOR) 40 mg tablet Take 40 mg by mouth daily. 8/20/21   Louis, MD Rsoe   aspirin delayed-release 81 mg tablet Take 81 mg by mouth daily. Rose Myers MD   guaiFENesin ER (MUCINEX) 600 mg ER tablet Take 1 Tab by mouth two (2) times a day.  Indications: cold symptoms, cough 3/3/21   Heather Duncan NP            Objective:     Visit Vitals  /74 (BP 1 Location: Left upper arm, BP Patient Position: Sitting, BP Cuff Size: Adult)   Pulse 70   Temp 98.1 °F (36.7 °C) (Temporal)   Resp 18   Ht 5' 7\" (1.702 m)   Wt 248 lb (112.5 kg)   SpO2 97%   BMI 38.84 kg/m²        Physical Exam  Vitals reviewed. Constitutional:       General: She is awake. She is not in acute distress. Appearance: Normal appearance. She is well-groomed. She is obese. Interventions: Nasal cannula in place. HENT:      Head: Normocephalic and atraumatic. Jaw: There is normal jaw occlusion. No trismus, tenderness or malocclusion. Salivary Glands: Right salivary gland is not diffusely enlarged or tender. Left salivary gland is not diffusely enlarged or tender. Right Ear: Tympanic membrane, ear canal and external ear normal. Decreased hearing noted. Left Ear: Tympanic membrane, ear canal and external ear normal. Decreased hearing noted. Ears:      Hardwick exam findings: does not lateralize. Right Rinne: AC > BC. Left Rinne: AC > BC. Comments: Hearing aids     Nose: Septal deviation and mucosal edema present. No nasal deformity. Right Turbinates: Not enlarged, swollen or pale. Left Turbinates: Not enlarged, swollen or pale. Right Sinus: No maxillary sinus tenderness or frontal sinus tenderness. Left Sinus: No maxillary sinus tenderness or frontal sinus tenderness. Comments: Dense crusting and ulceration to the left nasal septum anteriorly      Crusting right nasal septum suctioned     Mouth/Throat:      Lips: Pink. No lesions. Mouth: Mucous membranes are dry. No oral lesions. Dentition: Normal dentition. No dental caries. Tongue: No lesions. Palate: No mass and lesions. Pharynx: Oropharynx is clear. Uvula midline. No oropharyngeal exudate or posterior oropharyngeal erythema. Tonsils: No tonsillar exudate. 0 on the right. 0 on the left. Eyes:      General: Vision grossly intact. Extraocular Movements: Extraocular movements intact. Right eye: No nystagmus. Left eye: No nystagmus. Conjunctiva/sclera: Conjunctivae normal.      Pupils: Pupils are equal, round, and reactive to light. Neck:      Thyroid: No thyroid mass, thyromegaly or thyroid tenderness. Trachea: Trachea and phonation normal. No tracheal tenderness or tracheal deviation. Cardiovascular:      Rate and Rhythm: Normal rate and regular rhythm. Pulmonary:      Effort: Pulmonary effort is normal. No respiratory distress. Breath sounds: No stridor. Musculoskeletal:         General: No swelling or tenderness. Normal range of motion. Cervical back: No edema or erythema. Lymphadenopathy:      Cervical: No cervical adenopathy. Skin:     General: Skin is warm and dry. Findings: No lesion or rash. Neurological:      General: No focal deficit present. Mental Status: She is alert and oriented to person, place, and time. Mental status is at baseline. Cranial Nerves: Cranial nerves are intact. Coordination: Romberg sign negative. Gait: Gait is intact. Psychiatric:         Mood and Affect: Mood normal.         Behavior: Behavior normal. Behavior is cooperative. Assessment/Plan:     Encounter Diagnoses   Name Primary?  Anticoagulated by anticoagulation treatment Yes    Epistaxis     Oxygen dependent     Ulcer, nasal, septum      Bleeding has improved. I highly suspect this coming from the ulceration and crusting from the anterior septum. We stressed the use of humidified oxygen, nasal saline and Ayr gel, handout given. Ulcer is debrided today, improved. Will need to followup regularly for this. Follow-up in 3 months or sooner if needed    No orders of the defined types were placed in this encounter. Follow-up and Dispositions    · Return in about 3 months (around 2/22/2022). Wojciech Mar MD, [address]    [address] [place] [address] [address]  [address] [place] [address] [facility]

## 2021-12-17 ENCOUNTER — TRANSCRIBE ORDER (OUTPATIENT)
Dept: REGISTRATION | Age: 66
End: 2021-12-17

## 2021-12-17 ENCOUNTER — HOSPITAL ENCOUNTER (OUTPATIENT)
Dept: GENERAL RADIOLOGY | Age: 66
Discharge: HOME | End: 2021-12-17
Payer: MEDICARE

## 2021-12-17 DIAGNOSIS — J44.9 COPD (CHRONIC OBSTRUCTIVE PULMONARY DISEASE) (HCC): ICD-10-CM

## 2021-12-17 DIAGNOSIS — J44.9 COPD (CHRONIC OBSTRUCTIVE PULMONARY DISEASE) (HCC): Primary | ICD-10-CM

## 2021-12-17 PROCEDURE — 71046 X-RAY EXAM CHEST 2 VIEWS: CPT

## 2022-02-23 ENCOUNTER — OFFICE VISIT (OUTPATIENT)
Dept: ENT CLINIC | Age: 67
End: 2022-02-23
Payer: MEDICARE

## 2022-02-23 VITALS
DIASTOLIC BLOOD PRESSURE: 70 MMHG | HEIGHT: 67 IN | BODY MASS INDEX: 38.77 KG/M2 | RESPIRATION RATE: 16 BRPM | TEMPERATURE: 96.9 F | SYSTOLIC BLOOD PRESSURE: 106 MMHG | OXYGEN SATURATION: 94 % | WEIGHT: 247 LBS | HEART RATE: 65 BPM

## 2022-02-23 DIAGNOSIS — J34.0: Primary | ICD-10-CM

## 2022-02-23 DIAGNOSIS — Z99.81 OXYGEN DEPENDENT: ICD-10-CM

## 2022-02-23 DIAGNOSIS — R04.0 EPISTAXIS: ICD-10-CM

## 2022-02-23 DIAGNOSIS — J34.2 DNS (DEVIATED NASAL SEPTUM): ICD-10-CM

## 2022-02-23 PROCEDURE — 99214 OFFICE O/P EST MOD 30 MIN: CPT | Performed by: OTOLARYNGOLOGY

## 2022-02-23 PROCEDURE — 3017F COLORECTAL CA SCREEN DOC REV: CPT | Performed by: OTOLARYNGOLOGY

## 2022-02-23 PROCEDURE — G8417 CALC BMI ABV UP PARAM F/U: HCPCS | Performed by: OTOLARYNGOLOGY

## 2022-02-23 PROCEDURE — G9899 SCRN MAM PERF RSLTS DOC: HCPCS | Performed by: OTOLARYNGOLOGY

## 2022-02-23 PROCEDURE — G8427 DOCREV CUR MEDS BY ELIG CLIN: HCPCS | Performed by: OTOLARYNGOLOGY

## 2022-02-23 PROCEDURE — G8432 DEP SCR NOT DOC, RNG: HCPCS | Performed by: OTOLARYNGOLOGY

## 2022-02-23 NOTE — PROGRESS NOTES
Subjective:    Kelin Arcos   61 y.o.   1955     Followup Visit    Location -nose    Quality -epistaxis    Severity -severe    Duration -10 days    Timing -intermittent    Context -patient following up from hospitalization she was discharged on 1 week ago. She had left nasal packing done was hospitalized due to blood loss and oxygen requirements. Since discharge her Xarelto has been cut down to 10 mg. She has not had bleeding since being home. She is using humidified oxygen and using saline. She admits to manipulating the left nostril with her finger    Modifying Features -none    Associated symptoms/signs -none    11/22/2021 -1 month follow-up, she is overall doing better. No further bleeding from the nose, continues on her routine for nasal moisturization and remains on a lower dose of the Xarelto. She states that the left side is feeling more blocked than usual.    2/23/22 - 3 mos f/u - still having some nasal pain left side, no bleeding; she had Covid last month had to use a full face mask for her O2; now back on nasal O2    Review of Systems  Review of Systems   Constitutional: Negative for chills and fever. HENT: Positive for nosebleeds. Negative for ear pain, hearing loss and tinnitus. Eyes: Negative for blurred vision and double vision. Respiratory: Positive for shortness of breath. Negative for cough and sputum production. Cardiovascular: Negative for chest pain and palpitations. Gastrointestinal: Negative for heartburn, nausea and vomiting. Musculoskeletal: Negative for joint pain and neck pain. Skin: Negative. Neurological: Negative for dizziness, speech change, weakness and headaches. Endo/Heme/Allergies: Negative for environmental allergies. Bruises/bleeds easily. Psychiatric/Behavioral: Negative for memory loss. The patient does not have insomnia.           Past Medical History:   Diagnosis Date    Breast cancer ([facility] [address]     [facility] pulmonary disease [facility])     Stroke (cerebrum) ([facility] [address] 2015     Prior to Admission medications    Medication Sig Start Date End Date Taking? Authorizing Provider   Xarelto 10 mg tablet  9/27/21  Yes Provider, Historical   potassium chloride SA (MICRO-K) 10 mEq capsule  8/23/21  Yes Provider, Historical   furosemide (Lasix) 80 mg tablet Take 80 mg by mouth daily. Yes Provider, Historical   PARoxetine (PaxiL) 30 mg tablet Take 30 mg by mouth nightly. Yes Provider, Historical   pramipexole (Mirapex) 0.5 mg tablet Take 0.5 mg by mouth nightly. Yes Provider, Historical   traZODone (DESYREL) 50 mg tablet Take 50 mg by mouth nightly. Yes Provider, Historical   montelukast (SINGULAIR) 10 mg tablet Take 10 mg by mouth daily. Yes Provider, Historical   tiotropium (Spiriva with HandiHaler) 18 mcg inhalation capsule Take 1 Capsule by inhalation daily. Yes Provider, Historical   cyanocobalamin (Vitamin B-12) 1,000 mcg tablet Take 1,000 mcg by mouth daily. Yes Provider, Historical   Omeprazole delayed release (PRILOSEC D/R) 20 mg tablet Take 20 mg by mouth daily. Yes Provider, Historical   albuterol (PROVENTIL HFA, VENTOLIN HFA, PROAIR HFA) 90 mcg/actuation inhaler Take 1 Puff by inhalation every six (6) hours as needed for Wheezing. Yes Provider, Historical   fexofenadine (ALLEGRA) 180 mg tablet Take 180 mg by mouth daily. Yes Provider, Historical   cholecalciferol (Vitamin D3) (1000 Units /25 mcg) tablet Take 1,000 Units by mouth daily. Yes Provider, Historical   atorvastatin (LIPITOR) 40 mg tablet Take 40 mg by mouth daily. 8/20/21  Yes Other, MD Rose   aspirin delayed-release 81 mg tablet Take 81 mg by mouth daily. Yes Other, MD Rose   escitalopram oxalate (LEXAPRO) 20 mg tablet  7/27/21   Provider, Historical   potassium chloride SR (KLOR-CON 10) 10 mEq tablet Take 10 mEq by mouth daily. Provider, Historical   guaiFENesin ER (MUCINEX) 600 mg ER tablet Take 1 Tab by mouth two (2) times a day.  Indications: cold symptoms, cough  Patient not taking: Reported on 2/23/2022 3/3/21   [facility], NP            Objective:     Visit Vitals  /70 (BP 1 Location: Left upper arm, BP Patient Position: Sitting, BP Cuff Size: Large adult)   Pulse 65   Temp 96.9 °F (36.1 °C) (Temporal)   Resp 16   Ht 5' 7\" (1.702 m)   Wt 247 lb (112 kg)   SpO2 94%   BMI 38.69 kg/m²        Physical Exam  Vitals reviewed. Constitutional:       General: She is awake. She is not in acute distress. Appearance: Normal appearance. She is well-groomed. She is obese. Interventions: Nasal cannula in place. Comments: Also lost some weight   HENT:      Head: Normocephalic and atraumatic. Jaw: There is normal jaw occlusion. No trismus, tenderness or malocclusion. Salivary Glands: Right salivary gland is not diffusely enlarged or tender. Left salivary gland is not diffusely enlarged or tender. Right Ear: Tympanic membrane, ear canal and external ear normal. Decreased hearing noted. Left Ear: Tympanic membrane, ear canal and external ear normal. Decreased hearing noted. Ears:      Hardwick exam findings: does not lateralize. Right Rinne: AC > BC. Left Rinne: AC > BC. Comments: Hearing aids     Nose: Septal deviation and mucosal edema present. No nasal deformity. Right Turbinates: Not enlarged, swollen or pale. Left Turbinates: Not enlarged, swollen or pale. Right Sinus: No maxillary sinus tenderness or frontal sinus tenderness. Left Sinus: No maxillary sinus tenderness or frontal sinus tenderness. Comments: No crusting noted left septum ulcer, some dried blood overall more open nasal airway     Mouth/Throat:      Lips: Pink. No lesions. Mouth: Mucous membranes are dry. No oral lesions. Dentition: Normal dentition. No dental caries. Tongue: No lesions. Palate: No mass and lesions. Pharynx: Oropharynx is clear. Uvula midline.  No oropharyngeal exudate or posterior oropharyngeal erythema. Tonsils: No tonsillar exudate. 0 on the right. 0 on the left. Eyes:      General: Vision grossly intact. Extraocular Movements: Extraocular movements intact. Right eye: No nystagmus. Left eye: No nystagmus. Conjunctiva/sclera: Conjunctivae normal.      Pupils: Pupils are equal, round, and reactive to light. Neck:      Thyroid: No thyroid mass, thyromegaly or thyroid tenderness. Trachea: Trachea and phonation normal. No tracheal tenderness or tracheal deviation. Cardiovascular:      Rate and Rhythm: Normal rate and regular rhythm. Pulmonary:      Effort: Pulmonary effort is normal. No respiratory distress. Breath sounds: No stridor. Musculoskeletal:         General: No swelling or tenderness. Normal range of motion. Cervical back: No edema or erythema. Lymphadenopathy:      Cervical: No cervical adenopathy. Skin:     General: Skin is warm and dry. Findings: No lesion or rash. Neurological:      General: No focal deficit present. Mental Status: She is alert and oriented to person, place, and time. Mental status is at baseline. Cranial Nerves: Cranial nerves are intact. Coordination: Romberg sign negative. Gait: Gait is intact. Psychiatric:         Mood and Affect: Mood normal.         Behavior: Behavior normal. Behavior is cooperative. Assessment/Plan:     Encounter Diagnoses   Name Primary?  Ulcer, nasal, septum Yes    DNS (deviated nasal septum)     Oxygen dependent     Epistaxis      Bleeding has improved. I highly suspect this coming from the ulceration and crusting from the anterior septum. We stressed the use of humidified oxygen, nasal saline and Ayr gel, handout given. Ulcer is improved. Will need to followup regularly for this. Follow-up in 3-4 months or sooner if needed    No orders of the defined types were placed in this encounter. Wojciech Reyes MD, [address] [id number], [facility] AT [facility], [facility]    [address] [place] [address] [address]  [place] [address] [address]  [address]  [address]

## 2022-02-23 NOTE — PROGRESS NOTES
Visit Vitals  /70 (BP 1 Location: Left upper arm, BP Patient Position: Sitting, BP Cuff Size: Large adult)   Pulse 65   Temp 96.9 °F (36.1 °C) (Temporal)   Resp 16   Ht 5' 7\" (1.702 m)   Wt 247 lb (112 kg)   SpO2 94%   BMI 38.69 kg/m²     Chief Complaint   Patient presents with    Follow-up     Recheck Epistaxis.   Some nasal pain

## 2022-03-18 PROBLEM — Z99.81 OXYGEN DEPENDENT: Status: ACTIVE | Noted: 2021-09-29

## 2022-03-18 PROBLEM — R04.0 EPISTAXIS: Status: ACTIVE | Noted: 2021-09-20

## 2022-03-18 PROBLEM — R04.0 BLEEDING FROM THE NOSE: Status: ACTIVE | Noted: 2021-09-18

## 2022-03-19 PROBLEM — Z79.01 ANTICOAGULATED BY ANTICOAGULATION TREATMENT: Status: ACTIVE | Noted: 2021-09-18

## 2022-03-20 PROBLEM — J34.0 ULCER, NASAL, SEPTUM: Status: ACTIVE | Noted: 2021-11-22

## 2022-05-09 ENCOUNTER — HOSPITAL ENCOUNTER (EMERGENCY)
Age: 67
Discharge: HOME | End: 2022-05-09
Attending: EMERGENCY MEDICINE
Payer: MEDICARE

## 2022-05-09 VITALS
BODY MASS INDEX: 37.89 KG/M2 | HEART RATE: 71 BPM | SYSTOLIC BLOOD PRESSURE: 102 MMHG | RESPIRATION RATE: 20 BRPM | HEIGHT: 68 IN | WEIGHT: 250 LBS | TEMPERATURE: 98.7 F | DIASTOLIC BLOOD PRESSURE: 62 MMHG | OXYGEN SATURATION: 96 %

## 2022-05-09 DIAGNOSIS — B37.0 THRUSH: Primary | ICD-10-CM

## 2022-05-09 PROCEDURE — 74011000250 HC RX REV CODE- 250: Performed by: EMERGENCY MEDICINE

## 2022-05-09 PROCEDURE — 99284 EMERGENCY DEPT VISIT MOD MDM: CPT

## 2022-05-09 RX ORDER — NYSTATIN 100000 [USP'U]/ML
1 SUSPENSION ORAL 4 TIMES DAILY
Qty: 473 ML | Refills: 0 | Status: SHIPPED | OUTPATIENT
Start: 2022-05-09 | End: 2022-08-31

## 2022-05-09 RX ORDER — LIDOCAINE HYDROCHLORIDE 20 MG/ML
15 SOLUTION OROPHARYNGEAL
Status: COMPLETED | OUTPATIENT
Start: 2022-05-09 | End: 2022-05-09

## 2022-05-09 RX ADMIN — LIDOCAINE HYDROCHLORIDE 15 ML: 20 SOLUTION ORAL; TOPICAL at 12:37

## 2022-05-09 NOTE — DISCHARGE INSTRUCTIONS
You were seen in the ER for your mouth pain and possible rash. While we did not notice a significant rash, you do have risk factors for thrush and we are treating it as such. You should follow up with the ENT ([facility], and Throat) doctor we gave you to make sure this is getting better and not something else. Return to the ER for any new or concerning symptoms. Thank you! Thank you for allowing me to care for you in the emergency department. I sincerely hope that you are satisfied with your visit today. It is my goal to provide you with excellent care. Below you will find a list of your labs and imaging from your visit today. Should you have any questions regarding these results please do not hesitate to call the emergency department. Labs -   No results found for this or any previous visit (from the past 12 hour(s)). Radiologic Studies -   No orders to display     CT Results  (Last 48 hours)      None          CXR Results  (Last 48 hours)      None               If you feel that you have not received excellent quality care or timely care, please ask to speak to the nurse manager. Please choose us in the future for your continued health care needs. ------------------------------------------------------------------------------------------------------------  The exam and treatment you received in the Emergency Department were for an urgent problem and are not intended as complete care. It is important that you follow-up with a doctor, nurse practitioner, or physician assistant to:  (1) confirm your diagnosis,  (2) re-evaluation of changes in your illness and treatment, and  (3) for ongoing care. If your symptoms become worse or you do not improve as expected and you are unable to reach your usual health care provider, you should return to the Emergency Department. We are available 24 hours a day. Please take your discharge instructions with you when you go to your follow-up appointment.      If you have any problem arranging a follow-up appointment, contact the Emergency Department immediately. If a prescription has been provided, please have it filled as soon as possible to prevent a delay in treatment. Read the entire medication instruction sheet provided to you by the pharmacy. If you have any questions or reservations about taking the medication due to side effects or interactions with other medications, please call your primary care physician or contact the ER to speak with the charge nurse. Make an appointment with your family doctor or the physician you were referred to for follow-up of this visit as instructed on your discharge paperwork, as this is a mandatory follow-up. Return to the ER if you are unable to be seen or if you are unable to be seen in a timely manner. If you have any problem arranging the follow-up visit, contact the Emergency Department immediately.

## 2022-05-09 NOTE — ED TRIAGE NOTES
Pt states that 3 days ago she noticed a rash all over her mouth and tongue that is very painful and painful to her throat  Can only drink water - everything else hurts

## 2022-05-09 NOTE — ED PROVIDER NOTES
EMERGENCY DEPARTMENT HISTORY AND PHYSICAL EXAM      Date: 5/9/2022  Patient Name: Aurea Ball      History of Presenting Illness     Chief Complaint   Patient presents with    Rash       History Provided By: Patient and grandson    HPI: Aurea Ball, 61 y.o. female with a past medical history significant for breast CA, CVA, COPD on chronic supplemental O2 presents to the ED with cc of rash. Onset 3d ago, intra-oral, pain of tongue, mouth and throat. Tolerating PO fluids but pain with carbonated sodas and solids. Has hx of thrush, last steroid use ~2wks ago. Denies F/C/N/V/D. No new medications. There are no other complaints, changes, or physical findings at this time. PCP: Rose Myers MD    Current Facility-Administered Medications   Medication Dose Route Frequency Provider Last Rate Last Admin    lidocaine (XYLOCAINE) 2 % viscous solution 15 mL  15 mL Mouth/Throat NOW Pooja Alexander MD         Current Outpatient Medications   Medication Sig Dispense Refill    nystatin (MYCOSTATIN) 100,000 unit/mL suspension Take 5 mL by mouth four (4) times daily. swish and spit 473 mL 0    Xarelto 10 mg tablet       potassium chloride SA (MICRO-K) 10 mEq capsule       escitalopram oxalate (LEXAPRO) 20 mg tablet  (Patient not taking: Reported on 2/23/2022)      furosemide (Lasix) 80 mg tablet Take 80 mg by mouth daily.  PARoxetine (PaxiL) 30 mg tablet Take 30 mg by mouth nightly.  pramipexole (Mirapex) 0.5 mg tablet Take 0.5 mg by mouth nightly.  traZODone (DESYREL) 50 mg tablet Take 50 mg by mouth nightly.  montelukast (SINGULAIR) 10 mg tablet Take 10 mg by mouth daily.  tiotropium (Spiriva with HandiHaler) 18 mcg inhalation capsule Take 1 Capsule by inhalation daily.  potassium chloride SR (KLOR-CON 10) 10 mEq tablet Take 10 mEq by mouth daily.  cyanocobalamin (Vitamin B-12) 1,000 mcg tablet Take 1,000 mcg by mouth daily.       Omeprazole delayed release (PRILOSEC D/R) 20 mg tablet Take 20 mg by mouth daily.  albuterol (PROVENTIL HFA, VENTOLIN HFA, PROAIR HFA) 90 mcg/actuation inhaler Take 1 Puff by inhalation every six (6) hours as needed for Wheezing.  fexofenadine (ALLEGRA) 180 mg tablet Take 180 mg by mouth daily.  cholecalciferol (Vitamin D3) (1000 Units /25 mcg) tablet Take 1,000 Units by mouth daily.  atorvastatin (LIPITOR) 40 mg tablet Take 40 mg by mouth daily.  aspirin delayed-release 81 mg tablet Take 81 mg by mouth daily.  guaiFENesin ER (MUCINEX) 600 mg ER tablet Take 1 Tab by mouth two (2) times a day. Indications: cold symptoms, cough (Patient not taking: Reported on 2022) 30 Tab 0       Past History     Past Medical History:  Past Medical History:   Diagnosis Date    [facility] ([facility] [address]     Chronic obstructive pulmonary disease ([facility] [address]     Stroke (cerebrum) ([facility] [address]        Past Surgical History:  Past Surgical History:   Procedure Laterality Date    HX BREAST LUMPECTOMY Right     HX HEART CATHETERIZATION         Family History:  Family History   Family history unknown: Yes       Social History:  Social History     Tobacco Use    Smoking status: Former Smoker     Packs/day: 1.50     Years: 20.00     Pack years: 30.00     Types: Cigarettes     Quit date: 2016     Years since quittin.3    Smokeless tobacco: Never Used   Substance Use Topics    Alcohol use: Not Currently    Drug use: Never       Allergies:  No Known Allergies      Review of Systems   Constitutional: Negative except as in HPI. Eyes: Negative except as in HPI.  ENT: Negative except as in HPI. Cardiovascular: Negative except as in HPI. Respiratory: Negative except as in HPI. Gastrointestinal: Negative except as in HPI. Genitourinary: Negative except as in HPI. Musculoskeletal: Negative except as in HPI. Integumentary: Negative except as in HPI. Neurological: Negative except as in HPI. Psychiatric: Negative except as in HPI.   Endocrine: Negative except as in HPI. Hematologic/Lymphatic: Negative except as in HPI. Allergic/Immunologic: Negative except as in HPI. Physical Exam   Constitutional: Awake and alert, interactive, NAD  Eyes: PERRL, no injection or scleral icterus, no discharge  HEENT: NCAT, neck supple, MMM, no oropharyngeal exudates, ?papules on roof of mouth, no sloughing. CV: Mentating well  Respiratory: Unlabored  MSK: FROM, no joint effusions or edema  Skin: No rashes of palms or soles  Neuro: CN2-12 intact, symmetric facies, fluent speech. Psych: Well-groomed, normal speech, behavior, appropriate mood    Lab and Diagnostic Study Results     Labs -   No results found for this or any previous visit (from the past 12 hour(s)). Radiologic Studies -   [unfilled]  CT Results  (Last 48 hours)    None        CXR Results  (Last 48 hours)    None          Medical Decision Making and ED Course   - I am the first and primary provider for this patient AND AM THE PRIMARY PROVIDER OF RECORD. - I reviewed the vital signs, available nursing notes, past medical history, past surgical history, family history and social history. - Initial assessment performed. The patients presenting problems have been discussed, and the staff are in agreement with the care plan formulated and outlined with them. I have encouraged them to ask questions as they arise throughout their visit. Vital Signs-Reviewed the patient's vital signs. Patient Vitals for the past 12 hrs:   Temp Pulse Resp BP SpO2   05/09/22 1204 98.7 °F (37.1 °C) 71 20 102/62 96 %       EKG interpretation:         Provider Notes (Medical Decision Making):   66F w/oral pain likely thrush, however no white exudates c/w diagnosis. Will give lidocaine here and empiric nystatin given hx of frequent thrush. No oral sexual contact to suggest G/C or herpes. No new medications or oral ulcerations to suggest SJS. Will give ENT f/u and return precautions.     ED Course: Disposition     Disposition: DC- Adult Discharges: All of the diagnostic tests were reviewed and questions answered. Diagnosis, care plan and treatment options were discussed. The patient understands the instructions and will follow up as directed. The patients results have been reviewed with them. They have been counseled regarding their diagnosis. The patient verbally convey understanding and agreement of the signs, symptoms, diagnosis, treatment and prognosis and additionally agrees to follow up as recommended with their PCP in 24 - 48 hours. They also agree with the care-plan and convey that all of their questions have been answered. I have also put together some discharge instructions for them that include: 1) educational information regarding their diagnosis, 2) how to care for their diagnosis at home, as well a 3) list of reasons why they would want to return to the ED prior to their follow-up appointment, should their condition change. Discharged      Diagnosis     Clinical Impression:   1. Thrush        Attestations:     Estela Navas MD

## 2022-06-29 ENCOUNTER — TRANSCRIBE ORDER (OUTPATIENT)
Dept: SCHEDULING | Age: 67
End: 2022-06-29

## 2022-06-29 DIAGNOSIS — Z12.31 VISIT FOR SCREENING MAMMOGRAM: Primary | ICD-10-CM

## 2022-07-22 ENCOUNTER — TRANSCRIBE ORDER (OUTPATIENT)
Dept: SCHEDULING | Age: 67
End: 2022-07-22

## 2022-07-22 DIAGNOSIS — Z12.31 SCREENING MAMMOGRAM FOR HIGH-RISK PATIENT: Primary | ICD-10-CM

## 2022-07-27 ENCOUNTER — HOSPITAL ENCOUNTER (OUTPATIENT)
Dept: MAMMOGRAPHY | Age: 67
Discharge: HOME | End: 2022-07-27
Payer: MEDICARE

## 2022-07-27 DIAGNOSIS — Z12.31 SCREENING MAMMOGRAM FOR HIGH-RISK PATIENT: ICD-10-CM

## 2022-07-27 PROCEDURE — 77063 BREAST TOMOSYNTHESIS BI: CPT

## 2022-08-01 ENCOUNTER — HOSPITAL ENCOUNTER (EMERGENCY)
Age: 67
Discharge: HOME | End: 2022-08-01
Attending: EMERGENCY MEDICINE
Payer: MEDICARE

## 2022-08-01 VITALS
HEIGHT: 68 IN | WEIGHT: 250 LBS | HEART RATE: 90 BPM | TEMPERATURE: 98.3 F | BODY MASS INDEX: 37.89 KG/M2 | DIASTOLIC BLOOD PRESSURE: 84 MMHG | SYSTOLIC BLOOD PRESSURE: 131 MMHG | RESPIRATION RATE: 18 BRPM | OXYGEN SATURATION: 91 %

## 2022-08-01 DIAGNOSIS — R04.0 EPISTAXIS: Primary | ICD-10-CM

## 2022-08-01 PROCEDURE — 74011250637 HC RX REV CODE- 250/637: Performed by: EMERGENCY MEDICINE

## 2022-08-01 PROCEDURE — 99283 EMERGENCY DEPT VISIT LOW MDM: CPT

## 2022-08-01 RX ORDER — HYDROCODONE BITARTRATE AND ACETAMINOPHEN 5; 325 MG/1; MG/1
1 TABLET ORAL
Qty: 12 TABLET | Refills: 0 | Status: SHIPPED | OUTPATIENT
Start: 2022-08-01 | End: 2022-08-04

## 2022-08-01 RX ORDER — AMOXICILLIN AND CLAVULANATE POTASSIUM 875; 125 MG/1; MG/1
1 TABLET, FILM COATED ORAL 2 TIMES DAILY
Qty: 14 TABLET | Refills: 0 | Status: SHIPPED | OUTPATIENT
Start: 2022-08-01 | End: 2022-08-01

## 2022-08-01 RX ORDER — HYDROCODONE BITARTRATE AND ACETAMINOPHEN 5; 325 MG/1; MG/1
1 TABLET ORAL
Qty: 12 TABLET | Refills: 0 | Status: SHIPPED | OUTPATIENT
Start: 2022-08-01 | End: 2022-08-01

## 2022-08-01 RX ORDER — OXYMETAZOLINE HCL 0.05 %
2 SPRAY, NON-AEROSOL (ML) NASAL
Status: COMPLETED | OUTPATIENT
Start: 2022-08-01 | End: 2022-08-01

## 2022-08-01 RX ORDER — DULOXETIN HYDROCHLORIDE 30 MG/1
30 CAPSULE, DELAYED RELEASE ORAL 2 TIMES DAILY
COMMUNITY

## 2022-08-01 RX ORDER — HYDROCODONE BITARTRATE AND ACETAMINOPHEN 5; 325 MG/1; MG/1
1 TABLET ORAL
Status: COMPLETED | OUTPATIENT
Start: 2022-08-01 | End: 2022-08-01

## 2022-08-01 RX ORDER — AMOXICILLIN AND CLAVULANATE POTASSIUM 875; 125 MG/1; MG/1
1 TABLET, FILM COATED ORAL 2 TIMES DAILY
Qty: 14 TABLET | Refills: 0 | Status: SHIPPED | OUTPATIENT
Start: 2022-08-01 | End: 2022-08-08

## 2022-08-01 RX ORDER — COCAINE HYDROCHLORIDE 40 MG/ML
SOLUTION NASAL
Status: DISCONTINUED | OUTPATIENT
Start: 2022-08-01 | End: 2022-08-01 | Stop reason: HOSPADM

## 2022-08-01 RX ADMIN — HYDROCODONE BITARTRATE AND ACETAMINOPHEN 1 TABLET: 5; 325 TABLET ORAL at 18:02

## 2022-08-01 RX ADMIN — OXYMETAZOLINE HCL 2 SPRAY: 0.05 SPRAY NASAL at 16:26

## 2022-08-01 NOTE — ED NOTES
Assumed care of patient at [phone]. Bleeding controlled. Discharged home with daughter. No further questions.

## 2022-08-01 NOTE — ED TRIAGE NOTES
Pt has been having nose bleeds of and on for two days and started heavily today. Pt is on Xerelto. Sees ENT Dr. Kiersten Abbasi.

## 2022-08-01 NOTE — ED PROVIDER NOTES
EMERGENCY DEPARTMENT HISTORY AND PHYSICAL EXAM      Date: 8/1/2022  Patient Name: Bolivar Rendon    History of Presenting Illness     Chief Complaint   Patient presents with    Epistaxis       History Provided By: Patient and Patient's Daughter    HPI: Bolivar Rendon, 61 y.o. female with a significant past medical history of no anticoagulation due to strokes with prior nosebleeds presents to the ED with 2 days history of bilateral nosebleed. Patient and patient's family state they did not call the ENT she sees for the same. Patient denies fevers or chills, denies nausea or vomiting. There are no other complaints, changes, or physical findings at this time. PCP: Jordan Storm, DO    No current facility-administered medications on file prior to encounter. Current Outpatient Medications on File Prior to Encounter   Medication Sig Dispense Refill    DULoxetine (CYMBALTA) 20 mg capsule Take 20 mg by mouth in the morning. Xarelto 10 mg tablet       potassium chloride SA (MICRO-K) 10 mEq capsule       furosemide (LASIX) 80 mg tablet Take 80 mg by mouth daily. pramipexole (MIRAPEX) 0.5 mg tablet Take 0.5 mg by mouth nightly. traZODone (DESYREL) 50 mg tablet Take 50 mg by mouth nightly. montelukast (SINGULAIR) 10 mg tablet Take 10 mg by mouth daily. tiotropium (SPIRIVA) 18 mcg inhalation capsule Take 1 Capsule by inhalation daily. potassium chloride SR (KLOR-CON 10) 10 mEq tablet Take 10 mEq by mouth daily. cyanocobalamin 1,000 mcg tablet Take 1,000 mcg by mouth daily. Omeprazole delayed release (PRILOSEC D/R) 20 mg tablet Take 20 mg by mouth daily. albuterol (PROVENTIL HFA, VENTOLIN HFA, PROAIR HFA) 90 mcg/actuation inhaler Take 1 Puff by inhalation every six (6) hours as needed for Wheezing. fexofenadine (ALLEGRA) 180 mg tablet Take 180 mg by mouth daily. cholecalciferol (VITAMIN D3) (1000 Units /25 mcg) tablet Take 1,000 Units by mouth daily. atorvastatin (LIPITOR) 40 mg tablet Take 40 mg by mouth daily. aspirin delayed-release 81 mg tablet Take 81 mg by mouth daily. nystatin (MYCOSTATIN) 100,000 unit/mL suspension Take 5 mL by mouth four (4) times daily. swish and spit 473 mL 0    escitalopram oxalate (LEXAPRO) 20 mg tablet  (Patient not taking: No sig reported)      PARoxetine (PAXIL) 30 mg tablet Take 30 mg by mouth nightly. (Patient not taking: Reported on 2022)      guaiFENesin ER (MUCINEX) 600 mg ER tablet Take 1 Tab by mouth two (2) times a day. Indications: cold symptoms, cough (Patient not taking: Reported on 2022) 30 Tab 0       Past History     Past Medical History:  Past Medical History:   Diagnosis Date    Breast cancer ([facility] [address] 2015    Right    Chronic obstructive pulmonary disease ([facility] [address]     Stroke (cerebrum) ([facility] [address]        Past Surgical History:  Past Surgical History:   Procedure Laterality Date    HX BREAST LUMPECTOMY Right 2015    HX HEART CATHETERIZATION         Family History:  Family History   Problem Relation Age of Onset    Breast Cancer Sister        Social History:  Social History     Tobacco Use    Smoking status: Former     Packs/day: 1.50     Years: 20.00     Pack years: 30.00     Types: Cigarettes     Quit date: 2016     Years since quittin.5    Smokeless tobacco: Never   Vaping Use    Vaping Use: Never used   Substance Use Topics    Alcohol use: Not Currently    Drug use: Never       Allergies:  No Known Allergies    Review of Systems   Review of Systems  Review of Systems   Constitutional: Negative for chills and fever. HENT: Negative for sinus pressure and sinus pain. Eyes: Negative for photophobia and redness. Respiratory: Negative for shortness of breath and wheezing. Cardiovascular: Negative for chest pain and palpitations. Gastrointestinal: Negative for abdominal pain and nausea. Genitourinary: Negative for flank pain and hematuria.    Musculoskeletal: Negative for arthralgias and gait problem. Skin: Negative for color change and pallor. Neurological: Negative for dizziness and weakness. Physical Exam   Physical Exam  Physical Exam  Constitutional:       General: No acute distress. Appearance: Normal appearance. Not toxic-appearing. HENT:      Head: Normocephalic and atraumatic. Nose: Dried blood noted in bilateral nostrils. Mouth/Throat:      Mouth: Mucous membranes are moist.   Eyes:      Extraocular Movements: Extraocular movements intact. Pupils: Pupils are equal, round, and reactive to light. Cardiovascular:      Rate and Rhythm: Normal rate. Pulses: Normal pulses. Pulmonary:      Effort: Pulmonary effort is normal.      Breath sounds: No stridor. Abdominal:      General: Abdomen is flat. There is no distension. Musculoskeletal:         General: Normal range of motion. Cervical back: Normal range of motion and neck supple. Skin:     General: Skin is warm and dry. Capillary Refill: Capillary refill takes less than 2 seconds. Neurological:      General: No focal deficit present. Mental Status: Aert and oriented to person, place, and time. Psychiatric:         Mood and Affect: Mood normal.         Behavior: Behavior normal.     Lab and Diagnostic Study Results   Labs -   No results found for this or any previous visit (from the past 12 hour(s)). Radiologic Studies -   @lastxrresult@  CT Results  (Last 48 hours)      None          CXR Results  (Last 48 hours)      None            Medical Decision Making and ED Course   - I am the first provider for this patient. I reviewed the vital signs, available nursing notes, past medical history, past surgical history, family history and social history. - Initial assessment performed. The patients presenting problems have been discussed, and they are in agreement with the care plan formulated and outlined with them.   I have encouraged them to ask questions as they arise throughout their visit. Vital Signs-Reviewed the patient's vital signs. Patient Vitals for the past 12 hrs:   Temp Pulse Resp BP SpO2   08/01/22 1600 98.3 °F (36.8 °C) 90 18 131/84 91 %       Differential Diagnosis & Medical Decision Making Provider Note:   Given patient's prior history of recurrent epistaxis and small nostrils, we discussed the options. Initial attempt with Afrin unsuccessfully. Placed Surgicel bilaterally. Discussed the other options including bilateral rapid Rhino's, patient states she had one in the past and did not believe she could tolerate bilateral rapid Rhino's at this time. Discussed need for close follow-up with otolaryngology as well as return precautions. [facility]       ED Course:        Procedures   Performed by: Sayra Sandy MD  Epistaxis Management    Date/Time: 8/1/2022 6:26 PM  Performed by: Ginger Gasca MD  Authorized by: Ginger Gasca MD     Consent:     Consent obtained:  Emergent situation    Consent given by:  Patient    Risks, benefits, and alternatives were discussed: yes      Risks discussed:  Bleeding, infection and pain    Alternatives discussed:  No treatment  Anesthesia:     Anesthesia method:  None  Procedure details:     Treatment site:  L anterior and R anterior    Treatment method:  Merocel sponge    Treatment complexity:  Limited    Treatment episode: initial    Post-procedure details:     Assessment:  Bleeding stopped    Procedure completion:  Tolerated     Disposition   Disposition: DC- Adult Discharges: All of the diagnostic tests were reviewed and questions answered. Diagnosis, care plan and treatment options were discussed. The patient understands the instructions and will follow up as directed. The patients results have been reviewed with them. They have been counseled regarding their diagnosis.   The patient verbally convey understanding and agreement of the signs, symptoms, diagnosis, treatment and prognosis and additionally agrees to follow up as recommended with their PCP in 24 - 48 hours. They also agree with the care-plan and convey that all of their questions have been answered. I have also put together some discharge instructions for them that include: 1) educational information regarding their diagnosis, 2) how to care for their diagnosis at home, as well a 3) list of reasons why they would want to return to the ED prior to their follow-up appointment, should their condition change. DISCHARGE PLAN:  1. Current Discharge Medication List        CONTINUE these medications which have NOT CHANGED    Details   DULoxetine (CYMBALTA) 20 mg capsule Take 20 mg by mouth in the morning. Xarelto 10 mg tablet       potassium chloride SA (MICRO-K) 10 mEq capsule       furosemide (LASIX) 80 mg tablet Take 80 mg by mouth daily. pramipexole (MIRAPEX) 0.5 mg tablet Take 0.5 mg by mouth nightly. traZODone (DESYREL) 50 mg tablet Take 50 mg by mouth nightly. montelukast (SINGULAIR) 10 mg tablet Take 10 mg by mouth daily. tiotropium (SPIRIVA) 18 mcg inhalation capsule Take 1 Capsule by inhalation daily. potassium chloride SR (KLOR-CON 10) 10 mEq tablet Take 10 mEq by mouth daily. cyanocobalamin 1,000 mcg tablet Take 1,000 mcg by mouth daily. Omeprazole delayed release (PRILOSEC D/R) 20 mg tablet Take 20 mg by mouth daily. albuterol (PROVENTIL HFA, VENTOLIN HFA, PROAIR HFA) 90 mcg/actuation inhaler Take 1 Puff by inhalation every six (6) hours as needed for Wheezing. fexofenadine (ALLEGRA) 180 mg tablet Take 180 mg by mouth daily. cholecalciferol (VITAMIN D3) (1000 Units /25 mcg) tablet Take 1,000 Units by mouth daily. atorvastatin (LIPITOR) 40 mg tablet Take 40 mg by mouth daily. aspirin delayed-release 81 mg tablet Take 81 mg by mouth daily. nystatin (MYCOSTATIN) 100,000 unit/mL suspension Take 5 mL by mouth four (4) times daily.  swish and spit  Qty: 473 mL, Refills: 0      escitalopram oxalate (LEXAPRO) 20 mg tablet       PARoxetine (PAXIL) 30 mg tablet Take 30 mg by mouth nightly. guaiFENesin ER (MUCINEX) 600 mg ER tablet Take 1 Tab by mouth two (2) times a day. Indications: cold symptoms, cough  Qty: 30 Tab, Refills: 0    Associated Diagnoses: Frontal sinus pain           2. Follow-up Information    None       3. Return to ED if worse   4. Current Discharge Medication List            Diagnosis/Clinical Impression     Clinical Impression:   1. Epistaxis        Attestations: Reza Ortiz MD, am the primary clinician of record. Please note that this dictation was completed with [employer], the [employer] voice recognition software. Quite often unanticipated grammatical, syntax, homophones, and other interpretive errors are inadvertently transcribed by the computer software. Please disregard these errors. Please excuse any errors that have escaped final proofreading. Thank you.

## 2022-08-03 ENCOUNTER — OFFICE VISIT (OUTPATIENT)
Dept: ENT CLINIC | Age: 67
End: 2022-08-03
Payer: MEDICARE

## 2022-08-03 VITALS
DIASTOLIC BLOOD PRESSURE: 72 MMHG | BODY MASS INDEX: 37.89 KG/M2 | HEART RATE: 71 BPM | HEIGHT: 68 IN | WEIGHT: 250 LBS | OXYGEN SATURATION: 92 % | SYSTOLIC BLOOD PRESSURE: 112 MMHG

## 2022-08-03 DIAGNOSIS — J34.0: Primary | ICD-10-CM

## 2022-08-03 DIAGNOSIS — Z99.81 OXYGEN DEPENDENT: ICD-10-CM

## 2022-08-03 DIAGNOSIS — J34.2 DNS (DEVIATED NASAL SEPTUM): ICD-10-CM

## 2022-08-03 DIAGNOSIS — Z79.01 ANTICOAGULATED BY ANTICOAGULATION TREATMENT: ICD-10-CM

## 2022-08-03 DIAGNOSIS — R04.0 EPISTAXIS: ICD-10-CM

## 2022-08-03 PROCEDURE — 3017F COLORECTAL CA SCREEN DOC REV: CPT | Performed by: OTOLARYNGOLOGY

## 2022-08-03 PROCEDURE — G9899 SCRN MAM PERF RSLTS DOC: HCPCS | Performed by: OTOLARYNGOLOGY

## 2022-08-03 PROCEDURE — G8510 SCR DEP NEG, NO PLAN REQD: HCPCS | Performed by: OTOLARYNGOLOGY

## 2022-08-03 PROCEDURE — 30903 CONTROL OF NOSEBLEED: CPT | Performed by: OTOLARYNGOLOGY

## 2022-08-03 PROCEDURE — G8427 DOCREV CUR MEDS BY ELIG CLIN: HCPCS | Performed by: OTOLARYNGOLOGY

## 2022-08-03 PROCEDURE — 99214 OFFICE O/P EST MOD 30 MIN: CPT | Performed by: OTOLARYNGOLOGY

## 2022-08-03 PROCEDURE — G8417 CALC BMI ABV UP PARAM F/U: HCPCS | Performed by: OTOLARYNGOLOGY

## 2022-08-03 NOTE — LETTER
8/3/2022    Patient: Sofi Hogan   YOB: 1955   Date of Visit: 8/3/2022     [facility]ank[address] Varghese [address] [address]  Via Fax: [phone]    Dear Werner Delacruz,      Thank you for referring Ms. Virgie Castellanos to [facility] [address], THROAT AND ALLERGY CARE for evaluation. My notes for this consultation are attached. If you have questions, please do not hesitate to call me. I look forward to following your patient along with you.       Sincerely,    Sterling Leon MD

## 2022-08-03 NOTE — PROGRESS NOTES
Subjective:    Candi Arcos   61 y.o.   1955     Followup Visit    Location -nose    Quality -epistaxis    Severity -severe    Duration -10 days    Timing -intermittent    Context -patient following up from hospitalization she was discharged on 1 week ago. She had left nasal packing done was hospitalized due to blood loss and oxygen requirements. Since discharge her Xarelto has been cut down to 10 mg. She has not had bleeding since being home. She is using humidified oxygen and using saline. She admits to manipulating the left nostril with her finger    Modifying Features -none    Associated symptoms/signs -none    11/22/2021 -1 month follow-up, she is overall doing better. No further bleeding from the nose, continues on her routine for nasal moisturization and remains on a lower dose of the Xarelto. She states that the left side is feeling more blocked than usual.    2/23/22 - 3 mos f/u - still having some nasal pain left side, no bleeding; she had Covid last month had to use a full face mask for her O2; now back on nasal O2    8/3/2022 -6-month follow-up today, had nasal packing placed for epistaxis 2 days ago. States that she was applying Neosporin ointment to the left nose with a Q-tip when it started. She remains on her Xarelto. Has not held it    Review of Systems  Review of Systems   Constitutional:  Negative for chills and fever. HENT:  Positive for nosebleeds. Negative for ear pain, hearing loss and tinnitus. Eyes:  Negative for blurred vision and double vision. Respiratory:  Positive for shortness of breath. Negative for cough and sputum production. Cardiovascular:  Negative for chest pain and palpitations. Gastrointestinal:  Negative for heartburn, nausea and vomiting. Musculoskeletal:  Negative for joint pain and neck pain. Skin: Negative. Neurological:  Negative for dizziness, speech change, weakness and headaches.    Endo/Heme/Allergies:  Negative for environmental allergies. Bruises/bleeds easily. Psychiatric/Behavioral:  Negative for memory loss. The patient does not have insomnia. Past Medical History:   Diagnosis Date    Breast cancer ([facility] [address] 2015    Right    Chronic obstructive pulmonary disease ([facility] [address]     Stroke (cerebrum) ([facility] 2015     Prior to Admission medications    Medication Sig Start Date End Date Taking? Authorizing Provider   DULoxetine (CYMBALTA) 20 mg capsule Take 20 mg by mouth in the morning. Yes Other, MD Rose   amoxicillin-clavulanate (Augmentin) 875-125 mg per tablet Take 1 Tablet by mouth two (2) times a day for 7 days. 8/1/22 8/8/22 Yes Kitty Sellers MD   HYDROcodone-acetaminophen (NORCO) 5-325 mg per tablet Take 1 Tablet by mouth every six (6) hours as needed for Pain for up to 3 days. Max Daily Amount: 4 Tablets. 8/1/22 8/4/22 Yes Kitty Sellers MD   Xarelto 10 mg tablet  9/27/21  Yes Provider, Historical   furosemide (LASIX) 80 mg tablet Take 80 mg by mouth daily. Yes Provider, Historical   pramipexole (MIRAPEX) 0.5 mg tablet Take 0.5 mg by mouth nightly. Yes Provider, Historical   traZODone (DESYREL) 50 mg tablet Take 50 mg by mouth nightly. Yes Provider, Historical   montelukast (SINGULAIR) 10 mg tablet Take 10 mg by mouth daily. Yes Provider, Historical   tiotropium (SPIRIVA) 18 mcg inhalation capsule Take 1 Capsule by inhalation daily. Yes Provider, Historical   potassium chloride SR (KLOR-CON 10) 10 mEq tablet Take 10 mEq by mouth daily. Yes Provider, Historical   cyanocobalamin 1,000 mcg tablet Take 1,000 mcg by mouth daily. Yes Provider, Historical   Omeprazole delayed release (PRILOSEC D/R) 20 mg tablet Take 20 mg by mouth daily. Yes Provider, Historical   albuterol (PROVENTIL HFA, VENTOLIN HFA, PROAIR HFA) 90 mcg/actuation inhaler Take 1 Puff by inhalation every six (6) hours as needed for Wheezing. Yes Provider, Historical   fexofenadine (ALLEGRA) 180 mg tablet Take 180 mg by mouth daily.    Yes Provider, Historical   cholecalciferol (VITAMIN D3) (1000 Units /25 mcg) tablet Take 1,000 Units by mouth daily. Yes Provider, Historical   atorvastatin (LIPITOR) 40 mg tablet Take 40 mg by mouth daily. 8/20/21  Yes Other, MD Rose   aspirin delayed-release 81 mg tablet Take 81 mg by mouth daily. Yes Other, MD Rose   nystatin (MYCOSTATIN) 100,000 unit/mL suspension Take 5 mL by mouth four (4) times daily. swish and spit 5/9/22   Samir Espinoza MD   potassium chloride SA (MICRO-K) 10 mEq capsule  8/23/21   Provider, Historical   escitalopram oxalate (LEXAPRO) 20 mg tablet  7/27/21   Provider, Historical   PARoxetine (PAXIL) 30 mg tablet Take 30 mg by mouth nightly. Patient not taking: Reported on 8/1/2022    Provider, Historical   guaiFENesin ER (MUCINEX) 600 mg ER tablet Take 1 Tab by mouth two (2) times a day. Indications: cold symptoms, cough  Patient not taking: Reported on 2/23/2022 3/3/21   Hung Parker NP            Objective:     Visit Vitals  /72 (BP 1 Location: Left upper arm, BP Patient Position: Sitting, BP Cuff Size: Large adult)   Pulse 71   Ht 5' 8\" (1.727 m)   Wt 250 lb (113.4 kg)   SpO2 92%   BMI 38.01 kg/m²        Physical Exam  Vitals reviewed. Constitutional:       General: She is awake. She is not in acute distress. Appearance: Normal appearance. She is well-groomed. She is obese. Interventions: Nasal cannula in place. HENT:      Head: Normocephalic and atraumatic. Jaw: There is normal jaw occlusion. No trismus, tenderness or malocclusion. Salivary Glands: Right salivary gland is not diffusely enlarged or tender. Left salivary gland is not diffusely enlarged or tender. Right Ear: Tympanic membrane, ear canal and external ear normal. Decreased hearing noted. Left Ear: Tympanic membrane, ear canal and external ear normal. Decreased hearing noted. Ears:      Comments: Hearing aids     Nose: Septal deviation and mucosal edema present.  No nasal deformity. Right Turbinates: Not enlarged, swollen or pale. Left Turbinates: Not enlarged, swollen or pale. Right Sinus: No maxillary sinus tenderness or frontal sinus tenderness. Left Sinus: No maxillary sinus tenderness or frontal sinus tenderness. Comments: Surgicel packing in the right nose carefully removed. Left nostril has a anterior rapid Rhino pack was deflated and carefully removed. Nasal septum ulceration is raw, no significant bleeding but there is some fresh blood on the posterior aspect. Mouth/Throat:      Lips: Pink. No lesions. Mouth: Mucous membranes are dry. No oral lesions. Dentition: Normal dentition. No dental caries. Tongue: No lesions. Palate: No mass and lesions. Pharynx: Oropharynx is clear. Uvula midline. No oropharyngeal exudate or posterior oropharyngeal erythema. Tonsils: No tonsillar exudate. 0 on the right. 0 on the left. Eyes:      General: Vision grossly intact. Extraocular Movements: Extraocular movements intact. Right eye: No nystagmus. Left eye: No nystagmus. Conjunctiva/sclera: Conjunctivae normal.      Pupils: Pupils are equal, round, and reactive to light. Neck:      Thyroid: No thyroid mass, thyromegaly or thyroid tenderness. Trachea: Trachea and phonation normal. No tracheal tenderness or tracheal deviation. Cardiovascular:      Rate and Rhythm: Normal rate and regular rhythm. Pulmonary:      Effort: Pulmonary effort is normal. No respiratory distress. Breath sounds: No stridor. Musculoskeletal:         General: No swelling or tenderness. Normal range of motion. Cervical back: No edema or erythema. Lymphadenopathy:      Cervical: No cervical adenopathy. Skin:     General: Skin is warm and dry. Findings: No lesion or rash. Neurological:      General: No focal deficit present. Mental Status: She is alert and oriented to person, place, and time.  Mental status is at baseline. Cranial Nerves: Cranial nerves are intact. Coordination: Romberg sign negative. Gait: Gait is intact. Psychiatric:         Mood and Affect: Mood normal.         Behavior: Behavior normal. Behavior is cooperative. Procedure: Nasal Cautery for Epistaxis    Consent is obtained. The left nare is packed with a oxymetazoline/lidocaine cottonoid for several minutes for topical anesthesia. This was then removed and the  bleeding area was cauterized with silver nitrate with good effect. I cauterized the posterior area of her ulcer and some of the mucosa posterior to this. I then placed a small amount of Surgicel against the cauterized area. Assessment/Plan:     Encounter Diagnoses   Name Primary? Ulcer, nasal, septum Yes    DNS (deviated nasal septum)     Oxygen dependent     Epistaxis     Anticoagulated by anticoagulation treatment      Reviewed the ER notes. Bleeding is coming from the left septum as usual.  Cauterized today and Surgicel placed. I stressed the importance of nasal moisturization and not putting any Q-tips in the nose or picking the nose. Follow-up in 3 to 4 weeks    Orders Placed This Encounter    CTRL NOSEBLEED,ANTER,COMPLEX       Follow-up and Dispositions    Return in about 4 weeks (around 8/31/2022). Wojciech Chopra MD, [address] [facility]    [address]  [facility]    [address] [id number]  [place] [address] [place] [id number]

## 2022-08-03 NOTE — PROGRESS NOTES
Visit Vitals  /72 (BP 1 Location: Left upper arm, BP Patient Position: Sitting, BP Cuff Size: Large adult)   Pulse 71   Ht 5' 8\" (1.727 m)   Wt 250 lb (113.4 kg)   SpO2 92%   BMI 38.01 kg/m²     Chief Complaint   Patient presents with    Establish Care     Patient was seen in the ER 8/1/2022 with nose bleeding.   Nostrils both packed

## 2022-08-15 ENCOUNTER — TELEPHONE (OUTPATIENT)
Dept: ENT CLINIC | Age: 67
End: 2022-08-15

## 2022-08-15 NOTE — TELEPHONE ENCOUNTER
Pt daughter in law called stating that the antibiotics this pt was prescribed at the hospital when they placed packing in her nose cause her to develop a yeast infection. Pt daughter in law asked if Dr. Femi Osullivan could send in a prescription to treat this, so that her insurance will cover it.     Please advise

## 2022-08-16 RX ORDER — FLUCONAZOLE 150 MG/1
150 TABLET ORAL DAILY
Qty: 1 TABLET | Refills: 0 | Status: SHIPPED | OUTPATIENT
Start: 2022-08-16 | End: 2022-08-17

## 2022-08-22 ENCOUNTER — TELEPHONE (OUTPATIENT)
Dept: ENT CLINIC | Age: 67
End: 2022-08-22

## 2022-08-22 NOTE — TELEPHONE ENCOUNTER
Pt daughter in law called asking if we can send in the nasal spray Dr. Teagan Knowles recommended at her most recent appt as a prescription. She did not know the name of the medication but stated it was a spray/gel.     Please advise

## 2022-08-25 ENCOUNTER — HOSPITAL ENCOUNTER (EMERGENCY)
Age: 67
Discharge: LEFT BEFORE DISCHARGE | End: 2022-08-26
Attending: EMERGENCY MEDICINE
Payer: MEDICARE

## 2022-08-25 VITALS
HEIGHT: 68 IN | OXYGEN SATURATION: 91 % | SYSTOLIC BLOOD PRESSURE: 105 MMHG | DIASTOLIC BLOOD PRESSURE: 59 MMHG | BODY MASS INDEX: 36.53 KG/M2 | HEART RATE: 89 BPM | TEMPERATURE: 98.4 F | WEIGHT: 241 LBS | RESPIRATION RATE: 18 BRPM

## 2022-08-25 PROCEDURE — 75810000275 HC EMERGENCY DEPT VISIT NO LEVEL OF CARE

## 2022-08-25 PROCEDURE — U0005 INFEC AGEN DETEC AMPLI PROBE: HCPCS

## 2022-08-26 ENCOUNTER — TELEPHONE (OUTPATIENT)
Dept: ENT CLINIC | Age: 67
End: 2022-08-26

## 2022-08-26 LAB
SARS-COV-2, COV2: NORMAL
SARS-COV-2, XPLCVT: NOT DETECTED
SOURCE, COVRS: NORMAL

## 2022-08-26 NOTE — TELEPHONE ENCOUNTER
Pt daughter in law called again asking about the nasal spray/gel prescription. She stated that the pt was not prescribed any medications at the previous appointment but was given a sample of a nasal spray/gel.      She would like to know if there is any way she can have that sent in to the pt pharmacy so she can continue using it (as she ran out of the sample)    Please advise

## 2022-08-31 ENCOUNTER — APPOINTMENT (OUTPATIENT)
Dept: CT IMAGING | Age: 67
DRG: 190 | End: 2022-08-31
Attending: INTERNAL MEDICINE
Payer: MEDICARE

## 2022-08-31 ENCOUNTER — APPOINTMENT (OUTPATIENT)
Dept: GENERAL RADIOLOGY | Age: 67
DRG: 190 | End: 2022-08-31
Attending: EMERGENCY MEDICINE
Payer: MEDICARE

## 2022-08-31 ENCOUNTER — HOSPITAL ENCOUNTER (INPATIENT)
Age: 67
LOS: 5 days | Discharge: HOME | DRG: 190 | End: 2022-09-05
Attending: EMERGENCY MEDICINE | Admitting: INTERNAL MEDICINE
Payer: MEDICARE

## 2022-08-31 DIAGNOSIS — J96.01 ACUTE RESPIRATORY FAILURE WITH HYPOXIA (HCC): Primary | ICD-10-CM

## 2022-08-31 DIAGNOSIS — J44.1 COPD EXACERBATION (HCC): ICD-10-CM

## 2022-08-31 LAB
ANION GAP SERPL CALC-SCNC: 5 MMOL/L (ref 5–15)
ATRIAL RATE: 76 BPM
BASOPHILS # BLD: 0 K/UL (ref 0–0.1)
BASOPHILS NFR BLD: 0 % (ref 0–1)
BNP SERPL-MCNC: 274 PG/ML
BUN SERPL-MCNC: 12 MG/DL (ref 6–20)
BUN/CREAT SERPL: 20 (ref 12–20)
CA-I BLD-MCNC: 9.5 MG/DL (ref 8.5–10.1)
CALCULATED P AXIS, ECG09: 19 DEGREES
CALCULATED R AXIS, ECG10: -54 DEGREES
CALCULATED T AXIS, ECG11: 3 DEGREES
CHLORIDE SERPL-SCNC: 103 MMOL/L (ref 97–108)
CO2 SERPL-SCNC: 30 MMOL/L (ref 21–32)
COVID-19 RAPID TEST, COVR: NOT DETECTED
CREAT SERPL-MCNC: 0.61 MG/DL (ref 0.55–1.02)
DIAGNOSIS, 93000: NORMAL
DIFFERENTIAL METHOD BLD: ABNORMAL
EOSINOPHIL # BLD: 0.1 K/UL (ref 0–0.4)
EOSINOPHIL NFR BLD: 1 % (ref 0–7)
ERYTHROCYTE [DISTWIDTH] IN BLOOD BY AUTOMATED COUNT: 15.4 % (ref 11.5–14.5)
FLUAV AG NPH QL IA: NEGATIVE
FLUBV AG NOSE QL IA: NEGATIVE
GLUCOSE BLD STRIP.AUTO-MCNC: 156 MG/DL (ref 65–100)
GLUCOSE BLD STRIP.AUTO-MCNC: 215 MG/DL (ref 65–100)
GLUCOSE SERPL-MCNC: 99 MG/DL (ref 65–100)
HCT VFR BLD AUTO: 42.9 % (ref 35–47)
HGB BLD-MCNC: 13.8 G/DL (ref 11.5–16)
IMM GRANULOCYTES # BLD AUTO: 0.1 K/UL (ref 0–0.04)
IMM GRANULOCYTES NFR BLD AUTO: 1 % (ref 0–0.5)
LYMPHOCYTES # BLD: 3.2 K/UL (ref 0.8–3.5)
LYMPHOCYTES NFR BLD: 30 % (ref 12–49)
MCH RBC QN AUTO: 29.2 PG (ref 26–34)
MCHC RBC AUTO-ENTMCNC: 32.2 G/DL (ref 30–36.5)
MCV RBC AUTO: 90.9 FL (ref 80–99)
MONOCYTES # BLD: 1.1 K/UL (ref 0–1)
MONOCYTES NFR BLD: 10 % (ref 5–13)
NEUTS SEG # BLD: 6.3 K/UL (ref 1.8–8)
NEUTS SEG NFR BLD: 58 % (ref 32–75)
NRBC # BLD: 0 K/UL (ref 0–0.01)
NRBC BLD-RTO: 0 PER 100 WBC
P-R INTERVAL, ECG05: 144 MS
PERFORMED BY, TECHID: ABNORMAL
PERFORMED BY, TECHID: ABNORMAL
PLATELET # BLD AUTO: 145 K/UL (ref 150–400)
PMV BLD AUTO: 11.7 FL (ref 8.9–12.9)
POTASSIUM SERPL-SCNC: 3.9 MMOL/L (ref 3.5–5.1)
Q-T INTERVAL, ECG07: 390 MS
QRS DURATION, ECG06: 128 MS
QTC CALCULATION (BEZET), ECG08: 438 MS
RBC # BLD AUTO: 4.72 M/UL (ref 3.8–5.2)
SODIUM SERPL-SCNC: 138 MMOL/L (ref 136–145)
TROPONIN-HIGH SENSITIVITY: 29 NG/L (ref 0–51)
VENTRICULAR RATE, ECG03: 76 BPM
WBC # BLD AUTO: 10.8 K/UL (ref 3.6–11)

## 2022-08-31 PROCEDURE — 74011250636 HC RX REV CODE- 250/636: Performed by: EMERGENCY MEDICINE

## 2022-08-31 PROCEDURE — 83880 ASSAY OF NATRIURETIC PEPTIDE: CPT

## 2022-08-31 PROCEDURE — 80048 BASIC METABOLIC PNL TOTAL CA: CPT

## 2022-08-31 PROCEDURE — 74011000636 HC RX REV CODE- 636: Performed by: INTERNAL MEDICINE

## 2022-08-31 PROCEDURE — 74011000250 HC RX REV CODE- 250: Performed by: INTERNAL MEDICINE

## 2022-08-31 PROCEDURE — 74011250637 HC RX REV CODE- 250/637: Performed by: EMERGENCY MEDICINE

## 2022-08-31 PROCEDURE — 87635 SARS-COV-2 COVID-19 AMP PRB: CPT

## 2022-08-31 PROCEDURE — 99285 EMERGENCY DEPT VISIT HI MDM: CPT

## 2022-08-31 PROCEDURE — 87804 INFLUENZA ASSAY W/OPTIC: CPT

## 2022-08-31 PROCEDURE — 74011636637 HC RX REV CODE- 636/637: Performed by: INTERNAL MEDICINE

## 2022-08-31 PROCEDURE — 93005 ELECTROCARDIOGRAM TRACING: CPT

## 2022-08-31 PROCEDURE — 87040 BLOOD CULTURE FOR BACTERIA: CPT

## 2022-08-31 PROCEDURE — 94640 AIRWAY INHALATION TREATMENT: CPT

## 2022-08-31 PROCEDURE — 36415 COLL VENOUS BLD VENIPUNCTURE: CPT

## 2022-08-31 PROCEDURE — 74011250636 HC RX REV CODE- 250/636: Performed by: INTERNAL MEDICINE

## 2022-08-31 PROCEDURE — 65270000029 HC RM PRIVATE

## 2022-08-31 PROCEDURE — 85025 COMPLETE CBC W/AUTO DIFF WBC: CPT

## 2022-08-31 PROCEDURE — 71275 CT ANGIOGRAPHY CHEST: CPT

## 2022-08-31 PROCEDURE — 96374 THER/PROPH/DIAG INJ IV PUSH: CPT

## 2022-08-31 PROCEDURE — 71045 X-RAY EXAM CHEST 1 VIEW: CPT

## 2022-08-31 PROCEDURE — 74011250637 HC RX REV CODE- 250/637: Performed by: INTERNAL MEDICINE

## 2022-08-31 PROCEDURE — 84484 ASSAY OF TROPONIN QUANT: CPT

## 2022-08-31 PROCEDURE — 74011000250 HC RX REV CODE- 250: Performed by: EMERGENCY MEDICINE

## 2022-08-31 PROCEDURE — 82962 GLUCOSE BLOOD TEST: CPT

## 2022-08-31 RX ORDER — ATORVASTATIN CALCIUM 40 MG/1
40 TABLET, FILM COATED ORAL DAILY
COMMUNITY

## 2022-08-31 RX ORDER — TRAZODONE HYDROCHLORIDE 50 MG/1
50 TABLET ORAL
Status: DISCONTINUED | OUTPATIENT
Start: 2022-08-31 | End: 2022-09-05 | Stop reason: HOSPADM

## 2022-08-31 RX ORDER — ACETAMINOPHEN 325 MG/1
650 TABLET ORAL
Status: DISCONTINUED | OUTPATIENT
Start: 2022-08-31 | End: 2022-09-05 | Stop reason: HOSPADM

## 2022-08-31 RX ORDER — SODIUM CHLORIDE 0.9 % (FLUSH) 0.9 %
5-40 SYRINGE (ML) INJECTION AS NEEDED
Status: DISCONTINUED | OUTPATIENT
Start: 2022-08-31 | End: 2022-09-05 | Stop reason: HOSPADM

## 2022-08-31 RX ORDER — DULOXETIN HYDROCHLORIDE 20 MG/1
20 CAPSULE, DELAYED RELEASE ORAL DAILY
Status: DISCONTINUED | OUTPATIENT
Start: 2022-09-01 | End: 2022-09-03

## 2022-08-31 RX ORDER — IPRATROPIUM BROMIDE AND ALBUTEROL SULFATE 2.5; .5 MG/3ML; MG/3ML
3 SOLUTION RESPIRATORY (INHALATION)
COMMUNITY

## 2022-08-31 RX ORDER — ONDANSETRON 2 MG/ML
4 INJECTION INTRAMUSCULAR; INTRAVENOUS
Status: DISCONTINUED | OUTPATIENT
Start: 2022-08-31 | End: 2022-09-05 | Stop reason: HOSPADM

## 2022-08-31 RX ORDER — CETIRIZINE HCL 10 MG
10 TABLET ORAL DAILY
Status: DISCONTINUED | OUTPATIENT
Start: 2022-09-01 | End: 2022-09-05 | Stop reason: HOSPADM

## 2022-08-31 RX ORDER — LEVOFLOXACIN 5 MG/ML
750 INJECTION, SOLUTION INTRAVENOUS EVERY 24 HOURS
Status: DISCONTINUED | OUTPATIENT
Start: 2022-08-31 | End: 2022-09-03

## 2022-08-31 RX ORDER — IPRATROPIUM BROMIDE AND ALBUTEROL SULFATE 2.5; .5 MG/3ML; MG/3ML
3 SOLUTION RESPIRATORY (INHALATION)
Status: COMPLETED | OUTPATIENT
Start: 2022-08-31 | End: 2022-08-31

## 2022-08-31 RX ORDER — LEVOTHYROXINE SODIUM 75 UG/1
75 TABLET ORAL
COMMUNITY
End: 2022-09-05

## 2022-08-31 RX ORDER — ASPIRIN 81 MG/1
81 TABLET ORAL DAILY
Status: DISCONTINUED | OUTPATIENT
Start: 2022-09-01 | End: 2022-09-05 | Stop reason: HOSPADM

## 2022-08-31 RX ORDER — FUROSEMIDE 10 MG/ML
40 INJECTION INTRAMUSCULAR; INTRAVENOUS 2 TIMES DAILY
Status: DISCONTINUED | OUTPATIENT
Start: 2022-08-31 | End: 2022-09-04

## 2022-08-31 RX ORDER — IPRATROPIUM BROMIDE AND ALBUTEROL SULFATE 2.5; .5 MG/3ML; MG/3ML
3 SOLUTION RESPIRATORY (INHALATION)
Status: DISCONTINUED | OUTPATIENT
Start: 2022-08-31 | End: 2022-09-04

## 2022-08-31 RX ORDER — MAGNESIUM SULFATE 100 %
4 CRYSTALS MISCELLANEOUS AS NEEDED
Status: DISCONTINUED | OUTPATIENT
Start: 2022-08-31 | End: 2022-09-05 | Stop reason: HOSPADM

## 2022-08-31 RX ORDER — ACETAMINOPHEN 500 MG
500 TABLET ORAL AS NEEDED
COMMUNITY

## 2022-08-31 RX ORDER — CLONAZEPAM 0.5 MG/1
0.5 TABLET ORAL
COMMUNITY

## 2022-08-31 RX ORDER — FUROSEMIDE 40 MG/1
80 TABLET ORAL DAILY
Status: DISCONTINUED | OUTPATIENT
Start: 2022-09-01 | End: 2022-08-31

## 2022-08-31 RX ORDER — PANTOPRAZOLE SODIUM 40 MG/1
40 TABLET, DELAYED RELEASE ORAL DAILY
Status: DISCONTINUED | OUTPATIENT
Start: 2022-09-01 | End: 2022-09-05 | Stop reason: HOSPADM

## 2022-08-31 RX ORDER — LIDOCAINE HYDROCHLORIDE 20 MG/ML
15 SOLUTION OROPHARYNGEAL
Status: COMPLETED | OUTPATIENT
Start: 2022-08-31 | End: 2022-08-31

## 2022-08-31 RX ORDER — ACETAMINOPHEN 650 MG/1
650 SUPPOSITORY RECTAL
Status: DISCONTINUED | OUTPATIENT
Start: 2022-08-31 | End: 2022-09-05 | Stop reason: HOSPADM

## 2022-08-31 RX ORDER — IPRATROPIUM BROMIDE AND ALBUTEROL SULFATE 2.5; .5 MG/3ML; MG/3ML
SOLUTION RESPIRATORY (INHALATION)
Status: DISPENSED
Start: 2022-08-31 | End: 2022-09-01

## 2022-08-31 RX ORDER — INSULIN LISPRO 100 [IU]/ML
INJECTION, SOLUTION INTRAVENOUS; SUBCUTANEOUS
Status: DISCONTINUED | OUTPATIENT
Start: 2022-08-31 | End: 2022-09-05 | Stop reason: HOSPADM

## 2022-08-31 RX ORDER — POLYETHYLENE GLYCOL 3350 17 G/17G
17 POWDER, FOR SOLUTION ORAL DAILY PRN
Status: DISCONTINUED | OUTPATIENT
Start: 2022-08-31 | End: 2022-09-05 | Stop reason: HOSPADM

## 2022-08-31 RX ORDER — POTASSIUM CHLORIDE 750 MG/1
10 TABLET, FILM COATED, EXTENDED RELEASE ORAL DAILY
Status: DISCONTINUED | OUTPATIENT
Start: 2022-09-01 | End: 2022-09-05 | Stop reason: HOSPADM

## 2022-08-31 RX ORDER — DEXTROSE MONOHYDRATE 100 MG/ML
0-250 INJECTION, SOLUTION INTRAVENOUS AS NEEDED
Status: DISCONTINUED | OUTPATIENT
Start: 2022-08-31 | End: 2022-09-05 | Stop reason: HOSPADM

## 2022-08-31 RX ORDER — ATORVASTATIN CALCIUM 40 MG/1
40 TABLET, FILM COATED ORAL DAILY
Status: DISCONTINUED | OUTPATIENT
Start: 2022-09-01 | End: 2022-09-05 | Stop reason: HOSPADM

## 2022-08-31 RX ORDER — MAG HYDROX/ALUMINUM HYD/SIMETH 200-200-20
30 SUSPENSION, ORAL (FINAL DOSE FORM) ORAL ONCE
Status: COMPLETED | OUTPATIENT
Start: 2022-08-31 | End: 2022-08-31

## 2022-08-31 RX ORDER — ONDANSETRON 4 MG/1
4 TABLET, ORALLY DISINTEGRATING ORAL
Status: DISCONTINUED | OUTPATIENT
Start: 2022-08-31 | End: 2022-09-05 | Stop reason: HOSPADM

## 2022-08-31 RX ORDER — SODIUM CHLORIDE 0.9 % (FLUSH) 0.9 %
5-40 SYRINGE (ML) INJECTION EVERY 8 HOURS
Status: DISCONTINUED | OUTPATIENT
Start: 2022-08-31 | End: 2022-09-05 | Stop reason: HOSPADM

## 2022-08-31 RX ORDER — PRAMIPEXOLE DIHYDROCHLORIDE 0.12 MG/1
0.5 TABLET ORAL
Status: DISCONTINUED | OUTPATIENT
Start: 2022-08-31 | End: 2022-09-05 | Stop reason: HOSPADM

## 2022-08-31 RX ORDER — AMOXICILLIN AND CLAVULANATE POTASSIUM 875; 125 MG/1; MG/1
1 TABLET, FILM COATED ORAL 2 TIMES DAILY
COMMUNITY
Start: 2022-08-26 | End: 2022-09-05

## 2022-08-31 RX ORDER — MONTELUKAST SODIUM 10 MG/1
10 TABLET ORAL DAILY
Status: DISCONTINUED | OUTPATIENT
Start: 2022-09-01 | End: 2022-09-05 | Stop reason: HOSPADM

## 2022-08-31 RX ADMIN — IPRATROPIUM BROMIDE AND ALBUTEROL SULFATE 3 ML: 2.5; .5 SOLUTION RESPIRATORY (INHALATION) at 19:37

## 2022-08-31 RX ADMIN — LIDOCAINE HYDROCHLORIDE 15 ML: 20 SOLUTION ORAL; TOPICAL at 13:59

## 2022-08-31 RX ADMIN — IPRATROPIUM BROMIDE AND ALBUTEROL SULFATE 3 ML: 2.5; .5 SOLUTION RESPIRATORY (INHALATION) at 14:03

## 2022-08-31 RX ADMIN — IOPAMIDOL 100 ML: 755 INJECTION, SOLUTION INTRAVENOUS at 17:11

## 2022-08-31 RX ADMIN — FUROSEMIDE 40 MG: 10 INJECTION, SOLUTION INTRAMUSCULAR; INTRAVENOUS at 18:36

## 2022-08-31 RX ADMIN — IPRATROPIUM BROMIDE AND ALBUTEROL SULFATE 3 ML: 2.5; .5 SOLUTION RESPIRATORY (INHALATION) at 15:53

## 2022-08-31 RX ADMIN — ALUMINUM HYDROXIDE, MAGNESIUM HYDROXIDE, AND SIMETHICONE 30 ML: 200; 200; 20 SUSPENSION ORAL at 13:59

## 2022-08-31 RX ADMIN — LEVOFLOXACIN 750 MG: 5 INJECTION, SOLUTION INTRAVENOUS at 18:38

## 2022-08-31 RX ADMIN — FUROSEMIDE 40 MG: 10 INJECTION, SOLUTION INTRAMUSCULAR; INTRAVENOUS at 21:30

## 2022-08-31 RX ADMIN — SODIUM CHLORIDE, PRESERVATIVE FREE 5 ML: 5 INJECTION INTRAVENOUS at 21:33

## 2022-08-31 RX ADMIN — INSULIN LISPRO 4 UNITS: 100 INJECTION, SOLUTION INTRAVENOUS; SUBCUTANEOUS at 21:30

## 2022-08-31 RX ADMIN — METHYLPREDNISOLONE SODIUM SUCCINATE 125 MG: 125 INJECTION, POWDER, FOR SOLUTION INTRAMUSCULAR; INTRAVENOUS at 16:05

## 2022-08-31 RX ADMIN — SODIUM CHLORIDE, PRESERVATIVE FREE 10 ML: 5 INJECTION INTRAVENOUS at 16:27

## 2022-08-31 RX ADMIN — TRAZODONE HYDROCHLORIDE 50 MG: 50 TABLET ORAL at 21:30

## 2022-08-31 RX ADMIN — ACETAMINOPHEN 650 MG: 325 TABLET, FILM COATED ORAL at 18:35

## 2022-08-31 RX ADMIN — INSULIN LISPRO 2 UNITS: 100 INJECTION, SOLUTION INTRAVENOUS; SUBCUTANEOUS at 18:35

## 2022-08-31 NOTE — ED PROVIDER NOTES
EMERGENCY DEPARTMENT HISTORY AND PHYSICAL EXAM      Date: 8/31/2022  Patient Name: Daiana Chavez    History of Presenting Illness     Chief Complaint   Patient presents with    Cough    Headache    Back Pain     History Provided By: Patient    HPI: Daiana Chavez, 61 y.o. female with history of COPD and stroke who presents with chest burning. States symptoms started last night while at rest.  Burning sensation is in the center of her chest and radiates to her back. States that she also has been having coughing and wheezing. States that she has been treated for COPD exacerbation for the last week. She was seen at outside hospital and started on azithromycin and prednisone, and using her albuterol treatments. She uses 3 L of oxygen daily at all times. She denies any worsening shortness of breath. There are no other complaints, changes, or physical findings at this time. PCP: Myra Barker,     Current Facility-Administered Medications   Medication Dose Route Frequency Provider Last Rate Last Admin    methylPREDNISolone (PF) (Solu-MEDROL) injection 125 mg  125 mg IntraVENous NOW Uche Major DO        albuterol-ipratropium (DUO-NEB) 2.5 MG-0.5 MG/3 ML  3 mL Nebulization NOW Lokesh Ozuna DO         Current Outpatient Medications   Medication Sig Dispense Refill    DULoxetine (CYMBALTA) 20 mg capsule Take 20 mg by mouth in the morning. nystatin (MYCOSTATIN) 100,000 unit/mL suspension Take 5 mL by mouth four (4) times daily. swish and spit 473 mL 0    Xarelto 10 mg tablet       potassium chloride SA (MICRO-K) 10 mEq capsule       escitalopram oxalate (LEXAPRO) 20 mg tablet  (Patient not taking: No sig reported)      furosemide (LASIX) 80 mg tablet Take 80 mg by mouth daily. PARoxetine (PAXIL) 30 mg tablet Take 30 mg by mouth nightly. (Patient not taking: Reported on 8/1/2022)      pramipexole (MIRAPEX) 0.5 mg tablet Take 0.5 mg by mouth nightly.       traZODone (DESYREL) 50 mg tablet Take 50 mg by mouth nightly. montelukast (SINGULAIR) 10 mg tablet Take 10 mg by mouth daily. tiotropium (SPIRIVA) 18 mcg inhalation capsule Take 1 Capsule by inhalation daily. potassium chloride SR (KLOR-CON 10) 10 mEq tablet Take 10 mEq by mouth daily. cyanocobalamin 1,000 mcg tablet Take 1,000 mcg by mouth daily. Omeprazole delayed release (PRILOSEC D/R) 20 mg tablet Take 20 mg by mouth daily. albuterol (PROVENTIL HFA, VENTOLIN HFA, PROAIR HFA) 90 mcg/actuation inhaler Take 1 Puff by inhalation every six (6) hours as needed for Wheezing. fexofenadine (ALLEGRA) 180 mg tablet Take 180 mg by mouth daily. cholecalciferol (VITAMIN D3) (1000 Units /25 mcg) tablet Take 1,000 Units by mouth daily. atorvastatin (LIPITOR) 40 mg tablet Take 40 mg by mouth daily. aspirin delayed-release 81 mg tablet Take 81 mg by mouth daily. guaiFENesin ER (MUCINEX) 600 mg ER tablet Take 1 Tab by mouth two (2) times a day.  Indications: cold symptoms, cough (Patient not taking: Reported on 2022) 30 Tab 0       Past History   Past Medical History:  Past Medical History:   Diagnosis Date    Breast cancer ([facility] 2015    Right    Chronic obstructive pulmonary disease ([facility]     Stroke (cerebrum) ([facility]         Past Surgical History:  Past Surgical History:   Procedure Laterality Date    HX BREAST LUMPECTOMY Right 2015    HX HEART CATHETERIZATION         Family History:  Family History   Problem Relation Age of Onset    Breast Cancer Sister        Social History:  Social History     Tobacco Use    Smoking status: Former     Packs/day: 1.50     Years: 20.00     Pack years: 30.00     Types: Cigarettes     Quit date: 2016     Years since quittin.6    Smokeless tobacco: Never   Vaping Use    Vaping Use: Never used   Substance Use Topics    Alcohol use: Not Currently    Drug use: Never       Allergies:  No Known Allergies     Review of Systems   Review of Systems Constitutional:  Negative for fever. HENT:  Negative for congestion. Eyes:  Negative for visual disturbance. Respiratory:  Positive for cough and shortness of breath. Cardiovascular:  Positive for chest pain. Gastrointestinal:  Negative for abdominal pain. Genitourinary:  Negative for dysuria. Musculoskeletal:  Negative for arthralgias. Skin:  Negative for rash. Neurological:  Positive for headaches. Physical Exam   Constitutional: No acute distress. Well-nourished. Skin: No rash. ENT: No rhinorrhea. No cough. Head is normocephalic and atraumatic. Eye: No proptosis or conjunctival injections. Respiratory: No apparent respiratory distress. Mild wheezing bilaterally. No rales or rhonchi. Gastrointestinal: Nondistended. Musculoskeletal: No obvious bony deformities. Cardiac: Regular rate and rhythm. 2+ radial pulses. No murmurs. Lab and Diagnostic Study Results   Labs -     Recent Results (from the past 12 hour(s))   CBC WITH AUTOMATED DIFF    Collection Time: 08/31/22  1:56 PM   Result Value Ref Range    WBC 10.8 3.6 - 11.0 K/uL    RBC 4.72 3.80 - 5.20 M/uL    HGB 13.8 11.5 - 16.0 g/dL    HCT 42.9 35.0 - 47.0 %    MCV 90.9 80.0 - 99.0 FL    MCH 29.2 26.0 - 34.0 PG    MCHC 32.2 30.0 - 36.5 g/dL    RDW 15.4 (H) 11.5 - 14.5 %    PLATELET 209 (L) 524 - 400 K/uL    MPV 11.7 8.9 - 12.9 FL    NRBC 0.0 0.0  WBC    ABSOLUTE NRBC 0.00 0.00 - 0.01 K/uL    NEUTROPHILS 58 32 - 75 %    LYMPHOCYTES 30 12 - 49 %    MONOCYTES 10 5 - 13 %    EOSINOPHILS 1 0 - 7 %    BASOPHILS 0 0 - 1 %    IMMATURE GRANULOCYTES 1 (H) 0 - 0.5 %    ABS. NEUTROPHILS 6.3 1.8 - 8.0 K/UL    ABS. LYMPHOCYTES 3.2 0.8 - 3.5 K/UL    ABS. MONOCYTES 1.1 (H) 0.0 - 1.0 K/UL    ABS. EOSINOPHILS 0.1 0.0 - 0.4 K/UL    ABS. BASOPHILS 0.0 0.0 - 0.1 K/UL    ABS. IMM.  GRANS. 0.1 (H) 0.00 - 0.04 K/UL    DF AUTOMATED     METABOLIC PANEL, BASIC    Collection Time: 08/31/22  1:56 PM   Result Value Ref Range    Sodium 138 136 - 145 mmol/L    Potassium 3.9 3.5 - 5.1 mmol/L    Chloride 103 97 - 108 mmol/L    CO2 30 21 - 32 mmol/L    Anion gap 5 5 - 15 mmol/L    Glucose 99 65 - 100 mg/dL    BUN 12 6 - 20 mg/dL    Creatinine 0.61 0.55 - 1.02 mg/dL    BUN/Creatinine ratio 20 12 - 20      GFR est AA >60 >60 ml/min/1.73m2    GFR est non-AA >60 >60 ml/min/1.73m2    Calcium 9.5 8.5 - 10.1 mg/dL   TROPONIN-HIGH SENSITIVITY    Collection Time: 08/31/22  1:56 PM   Result Value Ref Range    Troponin-High Sensitivity 29 0 - 51 ng/L   NT-PRO BNP    Collection Time: 08/31/22  1:56 PM   Result Value Ref Range    NT pro- (H) <125 pg/mL       Radiologic Studies -   [unfilled]  CT Results  (Last 48 hours)      None          CXR Results  (Last 48 hours)                 08/31/22 1405  XR CHEST SNGL V Final result    Impression:  No interval change. Narrative:  INDICATION: Cough, headache, back pain, dyspnea, chest pain and burning. Portable AP view of the chest.       Direct comparison made to prior chest x-ray dated December 2021. Cardiomediastinal silhouette is stable. There are very mild bilateral increased   interstitial markings. There is no new focal airspace process. No pleural fluid   is visualized. There is no pneumothorax. Medical Decision Making and ED Course   - I am the first and primary provider for this patient AND AM THE PRIMARY PROVIDER OF RECORD. I reviewed the vital signs, available nursing notes, past medical history, past surgical history, family history and social history. - Initial assessment performed. The patients presenting problems have been discussed, and the staff are in agreement with the care plan formulated and outlined with them. I have encouraged them to ask questions as they arise throughout their visit. Vital Signs-Reviewed the patient's vital signs.   Patient Vitals for the past 12 hrs:   Temp Pulse Resp BP SpO2   08/31/22 1518 -- -- -- -- 98 %   08/31/22 1508 -- 74 -- 131/74 90 %   08/31/22 1405 -- -- -- -- 97 %   08/31/22 1316 98.2 °F (36.8 °C) 74 17 (!) 141/91 96 %       EKG interpretation: (Preliminary): EKG Interpreted by ED physician. Obtained on 8/31/2022 at 1325. Read at [phone] by myself. Normal sinus rhythm at rate of 76 bpm.  Left axis deviation. Normal MI interval, QRS duration, QTc interval.  No ST segment normalities. MDM  The differential diagnosis is COPD exacerbation, pneumonia, ACS, atypical chest pain, GERD. Lungs are wheezy. Patient likely has COPD exacerbation that is not improving. Patient given 125 mg IV Solu-Medrol and duo nebs x2. Patient is requiring increased oxygen. Normally she is on 3 L. Her oxygen dropped to 87% on 6 L. Here she is requiring nonrebreather. Patient discussed with hospitalist and will admit for acute respiratory failure. Low suspicion for pneumonia. No concern for pulmonary edema. No acute concern for pulmonary embolism. Procedures and Critical Care     Critical Care:   CRITICAL CARE:  I personally spent a total of 40 minutes of critical care time in obtaining history, performing a physical exam, bedside monitoring of interventions, collecting and interpreting tests but excluding time spent performing procedures, treating other patients and teaching time. Discussion with consultant: hospitalist.  Clinical Concern: hypoxia. Intervention: supplemental oxygen. Cristian Kuo D.O. Disposition   Disposition: Admitted to hospitalist    Diagnosis/Clinical Impression   Clinical Impression:   1. Acute respiratory failure with hypoxia ([address]    2. COPD exacerbation ([address]       Attestations:  Luana Lopez, DO    Please note that this dictation was completed with [employer], the [employer] voice recognition software. Quite often unanticipated grammatical, syntax, homophones, and other interpretive errors are inadvertently transcribed by the computer software. Please disregard these errors.   Please excuse any errors that have escaped final proofreading. Thank you.

## 2022-08-31 NOTE — ACP (ADVANCE CARE PLANNING)
Advance Care Planning   Healthcare Decision Maker:       Primary Decision Maker: Bree Ahn - [phone]

## 2022-08-31 NOTE — PROGRESS NOTES
Reason for Admission:  COPD                     RUR Score:  N/A                   Plan for utilizing home health:   None @ this time/uses home O2 via [address] [facility] on ambulation. PCP: First and Last name:  Lillian Garces DO     Name of Practice:    Are you a current patient: Yes/No: Yes   Approximate date of last visit: 2 mos ago. Can you participate in a virtual visit with your PCP: Yes/Call                    Current Advanced Directive/Advance Care Plan: Full Code      Healthcare Decision Maker:              Primary Decision Maker: Fermín Burkett - [phone]                  Transition of Care Plan:  D/C Plan is home alone & family to transport. Send Rxs to [facility] upon discharge.

## 2022-08-31 NOTE — ED NOTES
Patient dropped to 87% on 3L NC and was placed on 6L NC. Patients o2 sats went up to 95% and now are at 90%.  MD gomez

## 2022-08-31 NOTE — ED NOTES
MD notified that patients O2 sats dropped to 87% on 6L NC.  Placed on 15L non re breather and satting at 98%

## 2022-08-31 NOTE — H&P
History and Physical    Patient: Naomi Adhikari MRN: [id number]  SSN: [id number]    YOB: 1955  Age: 61 y.o. Sex: female      Subjective:      Naomi Adhikari is a 61 y.o. female who presents with worsening SOB, chest tightness, cough, chills, dry cough. Patient was seen at [facility] ER 5 days ago and prescribed Amoxicillin + Zithromax and Prednisone. She has known hx of COPD and is on Home O2 3L; follows with Dr. Sury Grande. She has not received any Covid Vaccine. She states that she was tested for Covid at [facility]. Additionally, increased swelling. No known Cardiac Hx. Patient is on Xarelto for remote hx of DVT/PE; has had previous epistaxis but not recently. In ER patient required NRB due to severe hypoxia. Past Medical History:   Diagnosis Date    Bre[facility] cancer ([facility] 2015    Right    Chronic obstructive pulmonary disease ([facility] [address]     Stroke (cerebrum) ([facility]      Past Surgical History:   Procedure Laterality Date    HX BREAST LUMPECTOMY Right 2015    HX HEART CATHETERIZATION        Family History   Problem Relation Age of Onset    Breast Cancer Sister      Social History     Tobacco Use    Smoking status: Former     Packs/day: 1.50     Years: 20.00     Pack years: 30.00     Types: Cigarettes     Quit date: 2016     Years since quittin.6    Smokeless tobacco: Never   Substance Use Topics    Alcohol use: Not Currently      Prior to Admission medications    Medication Sig Start Date End Date Taking? Authorizing Provider   DULoxetine (CYMBALTA) 20 mg capsule Take 20 mg by mouth in the morning. Other, MD Rose   nystatin (MYCOSTATIN) 100,000 unit/mL suspension Take 5 mL by mouth four (4) times daily.  swish and spit 22   Ela Rodriguez MD   Xarelto 10 mg tablet  21   Provider, Historical   potassium chloride SA (MICRO-K) 10 mEq capsule  21   Provider, Historical   escitalopram oxalate (LEXAPRO) 20 mg tablet  21   Provider, Historical furosemide (LASIX) 80 mg tablet Take 80 mg by mouth daily. Provider, Historical   PARoxetine (PAXIL) 30 mg tablet Take 30 mg by mouth nightly. Patient not taking: Reported on 8/1/2022    Provider, Historical   pramipexole (MIRAPEX) 0.5 mg tablet Take 0.5 mg by mouth nightly. Provider, Historical   traZODone (DESYREL) 50 mg tablet Take 50 mg by mouth nightly. Provider, Historical   montelukast (SINGULAIR) 10 mg tablet Take 10 mg by mouth daily. Provider, Historical   tiotropium (SPIRIVA) 18 mcg inhalation capsule Take 1 Capsule by inhalation daily. Provider, Historical   potassium chloride SR (KLOR-CON 10) 10 mEq tablet Take 10 mEq by mouth daily. Provider, Historical   cyanocobalamin 1,000 mcg tablet Take 1,000 mcg by mouth daily. Provider, Historical   Omeprazole delayed release (PRILOSEC D/R) 20 mg tablet Take 20 mg by mouth daily. Provider, Historical   albuterol (PROVENTIL HFA, VENTOLIN HFA, PROAIR HFA) 90 mcg/actuation inhaler Take 1 Puff by inhalation every six (6) hours as needed for Wheezing. Provider, Historical   fexofenadine (ALLEGRA) 180 mg tablet Take 180 mg by mouth daily. Provider, Historical   cholecalciferol (VITAMIN D3) (1000 Units /25 mcg) tablet Take 1,000 Units by mouth daily. Provider, Historical   atorvastatin (LIPITOR) 40 mg tablet Take 40 mg by mouth daily. 8/20/21   OtherRose MD   aspirin delayed-release 81 mg tablet Take 81 mg by mouth daily. Rose Myers MD   guaiFENesin ER (MUCINEX) 600 mg ER tablet Take 1 Tab by mouth two (2) times a day.  Indications: cold symptoms, cough  Patient not taking: Reported on 2/23/2022 3/3/21   Mirian Mensah NP        No Known Allergies    Review of Systems:  Constitutional: No fevers, No chills, No night sweats, No fatigue, No weakness, No significant weight change  Eyes: No visual disturbance, No loss of vision  HENT: No nasal congestion, No sore throat, No head lesion, No hearing deficit  Respiratory: See HPI  Cardiovascular: No chest pain, + lower extremity edema, No palpitations, No PND, No orthopnea  Gastrointestinal: No nausea, No vomiting, No diarrhea, No constipation, No abdominal pain, No Melena, No hematemesis, No BRBPR  Genitourinary: No frequency, No dysuria, No hematuria  Integument/Breast: No rash, No new skin lesion(s), No dryness, No new palpable nodule  Musculoskeletal: No arthralgias, No neck pain, No back pain, No joint pain, No fall or injury  Neurological: No headaches, No dizziness, No confusion,  No seizures, No focal weakness, No LOC, No paresthesia  Heme/Onc: No overt bleeding noted, No obvious swollen glands  Behavioral/Psychiatric: No change in mood; no SI; no hallucination      Objective:     Vitals:    08/31/22 1508 08/31/22 1518 08/31/22 1551 08/31/22 1554   BP: 131/74  132/73    Pulse: 74  79    Resp:   23    Temp:       SpO2: 90% 98% 98% 93%   Weight:       Height:            Physical Exam:    General: Alert and responsive  Head: atraumatic  Eye: No overt orbital findings, PERRLA   ENT: No overt hearing loss noted; No nasal lesion; Throat ama  Neck: Supple, No overt palpable mass, No significant palpable lymph nodes  Vascular: No carotid bruit, palpable pulses bilat  Lung: Harsh, coarse bilat breath sounds  Heart: S1S2, No significant murmur appreciated  Abdomen: Soft, NT, No rigidity, No rebound, Bowel sounds +  Extremities: trace edema  M/S: No traumatic change, no calf tenderness  Skin: No cyanosis, No rash of significance (other than chronic lesions)  Neurologic: No overt focal motor deficit. Oriented.    Psychiatric: Coherent and age appropriate    Recent Results (from the past 24 hour(s))   CBC WITH AUTOMATED DIFF    Collection Time: 08/31/22  1:56 PM   Result Value Ref Range    WBC 10.8 3.6 - 11.0 K/uL    RBC 4.72 3.80 - 5.20 M/uL    HGB 13.8 11.5 - 16.0 g/dL    HCT 42.9 35.0 - 47.0 %    MCV 90.9 80.0 - 99.0 FL    MCH 29.2 26.0 - 34.0 PG    MCHC 32.2 30.0 - 36.5 g/dL    RDW 15.4 (H) 11.5 - 14.5 %    PLATELET 749 (L) 311 - 400 K/uL    MPV 11.7 8.9 - 12.9 FL    NRBC 0.0 0.0  WBC    ABSOLUTE NRBC 0.00 0.00 - 0.01 K/uL    NEUTROPHILS 58 32 - 75 %    LYMPHOCYTES 30 12 - 49 %    MONOCYTES 10 5 - 13 %    EOSINOPHILS 1 0 - 7 %    BASOPHILS 0 0 - 1 %    IMMATURE GRANULOCYTES 1 (H) 0 - 0.5 %    ABS. NEUTROPHILS 6.3 1.8 - 8.0 K/UL    ABS. LYMPHOCYTES 3.2 0.8 - 3.5 K/UL    ABS. MONOCYTES 1.1 (H) 0.0 - 1.0 K/UL    ABS. EOSINOPHILS 0.1 0.0 - 0.4 K/UL    ABS. BASOPHILS 0.0 0.0 - 0.1 K/UL    ABS. IMM. GRANS. 0.1 (H) 0.00 - 0.04 K/UL    DF AUTOMATED     METABOLIC PANEL, BASIC    Collection Time: 08/31/22  1:56 PM   Result Value Ref Range    Sodium 138 136 - 145 mmol/L    Potassium 3.9 3.5 - 5.1 mmol/L    Chloride 103 97 - 108 mmol/L    CO2 30 21 - 32 mmol/L    Anion gap 5 5 - 15 mmol/L    Glucose 99 65 - 100 mg/dL    BUN 12 6 - 20 mg/dL    Creatinine 0.61 0.55 - 1.02 mg/dL    BUN/Creatinine ratio 20 12 - 20      GFR est AA >60 >60 ml/min/1.73m2    GFR est non-AA >60 >60 ml/min/1.73m2    Calcium 9.5 8.5 - 10.1 mg/dL   TROPONIN-HIGH SENSITIVITY    Collection Time: 08/31/22  1:56 PM   Result Value Ref Range    Troponin-High Sensitivity 29 0 - 51 ng/L   NT-PRO BNP    Collection Time: 08/31/22  1:56 PM   Result Value Ref Range    NT pro- (H) <125 pg/mL       XR Results (maximum last 3): Results from [place] encounter on 08/31/22    XR CHEST SNGL V    Narrative  INDICATION: Cough, headache, back pain, dyspnea, chest pain and burning. Portable AP view of the chest.    Direct comparison made to prior chest x-ray dated December 2021. Cardiomediastinal silhouette is stable. There are very mild bilateral increased  interstitial markings. There is no new focal airspace process. No pleural fluid  is visualized. There is no pneumothorax. Impression  No interval change.       Results from [place] encounter on 12/17/21    XR CHEST PA LAT    Narrative  Examination: XR CHEST PA LAT    History: COPD    Comparison: Chest radiograph 6/16/2021    FINDINGS:    2 views of the chest. Coarse interstitial lung markings diffusely could be  related to provided clinical history of COPD. No definite focal airspace  consolidation, pneumothorax, or significant pleural effusion. Cardiac silhouette  is normal in size. Mediastinal contours and osseous structures appear unchanged  without acute abnormality evident. Impression  Diffuse coarsened interstitial lung markings appearing similar to prior. No  findings of acute cardiopulmonary abnormality. Results from [place] encounter on 06/16/21    XR CHEST PA LAT    Narrative  Chest, 2 views, 6/16/2021    History: Chronic obstructive pulmonary disease. Comparison: Including chest 10/29/2020. Findings: The cardiac silhouette is within normal limits. The lungs are  adequately expanded. No hydrostatic edema is present. Reticular opacities in  the lungs and peribronchial thickening are not significantly changed compared to  chest 10/29/2020. No focal consolidation, pleural effusions or pneumothorax is  identified. Degenerative changes are present in the thoracic spine. Impression  Reticular opacities and peribronchial thickening, not significantly  changed. No focal airspace consolidation is identified. Active Problems:    COPD with acute exacerbation ([address] (8/31/2022)        Assessment/Plan:     # Acute/Chronic Hypoxemic Resp Failure with hx of Chronic O2, COPD, DVT/PE on Xarelto.  Failed outpatient mgmt with Prednisone, Amoxil/Zithro    => Admit  => IV Solumedrol, Nebs, O2, Empiric Levaquin  => CTA Chest (given hx of PE)  => Echo; Mild BNP elevation --> IV lasix  => Continue Xarelto    Hx of \"Stroke\", HTN, Breast CA, HLD, Depression/Anxiety    DVT Prophylaxis: On Xarelto  Code Status: FULL  POA: Self    Signed By: Amy Gallardo MD     August 31, 2022

## 2022-08-31 NOTE — ED TRIAGE NOTES
Reports she has had increase coughing and bodyaches. Admits to nausea, no vomting. States she wa seen at [facility] on 25 Aug who told her she has \"really bad COPD\" , gave her meds but she does not think she is any better.  Dr.Allaudin strong who she has not seen in ?a year

## 2022-08-31 NOTE — PROGRESS NOTES
Admission Medication Reconciliation:    Information obtained from:  Patient    Comments/Recommendations: Reviewed PTA medications and patient's allergies. Removed escitalopram 20 mg  Removed guaifenesin er 600 mg  Removed paroxetine 30 mg        Allergies:  Patient has no known allergies. Significant PMH/Disease States:   Past Medical History:   Diagnosis Date    Breast cancer ([facility] 2015    Right    Chronic obstructive pulmonary disease ([facility] [address]     Stroke (cerebrum) ([facility] 2015     Chief Complaint for this Admission:    Chief Complaint   Patient presents with    Cough    Headache    Back Pain     Prior to Admission Medications:   Prior to Admission Medications   Prescriptions Last Dose Informant Patient Reported? Taking? DULoxetine (CYMBALTA) 30 mg capsule  Self Yes Yes   Sig: Take 30 mg by mouth two (2) times a day. Omeprazole delayed release (PRILOSEC D/R) 20 mg tablet  Self Yes Yes   Sig: Take 20 mg by mouth daily. Xarelto 10 mg tablet  Self Yes Yes   Sig: Take 10 mg by mouth daily. acetaminophen (TYLENOL) 500 mg tablet  Self Yes Yes   Sig: Take 500 mg by mouth as needed for Pain. albuterol (PROVENTIL HFA, VENTOLIN HFA, PROAIR HFA) 90 mcg/actuation inhaler  Self Yes Yes   Sig: Take 2 Puffs by inhalation every six (6) hours as needed for Wheezing. albuterol-ipratropium (DUO-NEB) 2.5 mg-0.5 mg/3 ml nebu  Self Yes Yes   Sig: 3 mL by Nebulization route every six (6) hours as needed for Wheezing. amoxicillin-clavulanate (AUGMENTIN) 875-125 mg per tablet  Self Yes Yes   Sig: Take 1 Tablet by mouth two (2) times a day. aspirin delayed-release 81 mg tablet  Self Yes Yes   Sig: Take 81 mg by mouth daily. atorvastatin (LIPITOR) 40 mg tablet  Self Yes Yes   Sig: Take 40 mg by mouth daily. cholecalciferol (VITAMIN D3) (1000 Units /25 mcg) tablet  Self Yes Yes   Sig: Take 1,000 Units by mouth daily. clonazePAM (KlonoPIN) 0.5 mg tablet  Self Yes Yes   Sig: Take 0.5 mg by mouth nightly.    cyanocobalamin 1,000 mcg tablet  Self Yes Yes   Sig: Take 5,000 mcg by mouth daily. fexofenadine (ALLEGRA) 180 mg tablet  Self Yes Yes   Sig: Take 180 mg by mouth daily. furosemide (LASIX) 80 mg tablet  Self Yes Yes   Sig: Take 80 mg by mouth daily. levothyroxine (SYNTHROID) 75 mcg tablet  Self Yes Yes   Sig: Take 75 mcg by mouth Daily (before breakfast). linaCLOtide (LINZESS) 145 mcg cap capsule  Self Yes Yes   Sig: Take 145 mcg by mouth daily as needed. montelukast (SINGULAIR) 10 mg tablet  Self Yes Yes   Sig: Take 10 mg by mouth daily. potassium chloride SR (KLOR-CON 10) 10 mEq tablet  Self Yes Yes   Sig: Take 10 mEq by mouth daily. pramipexole (MIRAPEX) 0.25 mg tablet  Self Yes Yes   Sig: Take 0.25 mg by mouth nightly. tiotropium (SPIRIVA) 18 mcg inhalation capsule  Self Yes Yes   Sig: Take 1 Capsule by inhalation daily. traZODone (DESYREL) 50 mg tablet  Self Yes Yes   Sig: Take 50 mg by mouth nightly.       Facility-Administered Medications: None       Elisabeth Isaacs

## 2022-09-01 LAB
ALBUMIN SERPL-MCNC: 3.4 G/DL (ref 3.5–5)
ALBUMIN/GLOB SERPL: 1.1 {RATIO} (ref 1.1–2.2)
ALP SERPL-CCNC: 54 U/L (ref 45–117)
ALT SERPL-CCNC: 90 U/L (ref 12–78)
ANION GAP SERPL CALC-SCNC: 7 MMOL/L (ref 5–15)
AST SERPL W P-5'-P-CCNC: 49 U/L (ref 15–37)
BASOPHILS # BLD: 0 K/UL (ref 0–0.1)
BASOPHILS NFR BLD: 0 % (ref 0–1)
BILIRUB SERPL-MCNC: 0.6 MG/DL (ref 0.2–1)
BUN SERPL-MCNC: 15 MG/DL (ref 6–20)
BUN/CREAT SERPL: 19 (ref 12–20)
CA-I BLD-MCNC: 9.3 MG/DL (ref 8.5–10.1)
CHLORIDE SERPL-SCNC: 99 MMOL/L (ref 97–108)
CO2 SERPL-SCNC: 31 MMOL/L (ref 21–32)
CREAT SERPL-MCNC: 0.77 MG/DL (ref 0.55–1.02)
CRP SERPL-MCNC: 1.3 MG/DL (ref 0–0.6)
DIFFERENTIAL METHOD BLD: ABNORMAL
EOSINOPHIL # BLD: 0 K/UL (ref 0–0.4)
EOSINOPHIL NFR BLD: 0 % (ref 0–7)
ERYTHROCYTE [DISTWIDTH] IN BLOOD BY AUTOMATED COUNT: 15.1 % (ref 11.5–14.5)
GLOBULIN SER CALC-MCNC: 3.2 G/DL (ref 2–4)
GLUCOSE BLD STRIP.AUTO-MCNC: 134 MG/DL (ref 65–100)
GLUCOSE BLD STRIP.AUTO-MCNC: 179 MG/DL (ref 65–100)
GLUCOSE BLD STRIP.AUTO-MCNC: 186 MG/DL (ref 65–100)
GLUCOSE BLD STRIP.AUTO-MCNC: 221 MG/DL (ref 65–100)
GLUCOSE SERPL-MCNC: 169 MG/DL (ref 65–100)
HCT VFR BLD AUTO: 40.6 % (ref 35–47)
HGB BLD-MCNC: 13.3 G/DL (ref 11.5–16)
IMM GRANULOCYTES # BLD AUTO: 0.1 K/UL (ref 0–0.04)
IMM GRANULOCYTES NFR BLD AUTO: 1 % (ref 0–0.5)
LYMPHOCYTES # BLD: 1.6 K/UL (ref 0.8–3.5)
LYMPHOCYTES NFR BLD: 14 % (ref 12–49)
MAGNESIUM SERPL-MCNC: 2.2 MG/DL (ref 1.6–2.4)
MCH RBC QN AUTO: 29.2 PG (ref 26–34)
MCHC RBC AUTO-ENTMCNC: 32.8 G/DL (ref 30–36.5)
MCV RBC AUTO: 89 FL (ref 80–99)
MONOCYTES # BLD: 0.4 K/UL (ref 0–1)
MONOCYTES NFR BLD: 4 % (ref 5–13)
NEUTS SEG # BLD: 9.6 K/UL (ref 1.8–8)
NEUTS SEG NFR BLD: 81 % (ref 32–75)
NRBC # BLD: 0 K/UL (ref 0–0.01)
NRBC BLD-RTO: 0 PER 100 WBC
PERFORMED BY, TECHID: ABNORMAL
PLATELET # BLD AUTO: 141 K/UL (ref 150–400)
PMV BLD AUTO: 11.8 FL (ref 8.9–12.9)
POTASSIUM SERPL-SCNC: 4.1 MMOL/L (ref 3.5–5.1)
PROCALCITONIN SERPL-MCNC: <0.05 NG/ML
PROT SERPL-MCNC: 6.6 G/DL (ref 6.4–8.2)
RBC # BLD AUTO: 4.56 M/UL (ref 3.8–5.2)
SODIUM SERPL-SCNC: 137 MMOL/L (ref 136–145)
WBC # BLD AUTO: 11.7 K/UL (ref 3.6–11)

## 2022-09-01 PROCEDURE — 74011000250 HC RX REV CODE- 250: Performed by: INTERNAL MEDICINE

## 2022-09-01 PROCEDURE — 84145 PROCALCITONIN (PCT): CPT

## 2022-09-01 PROCEDURE — 36415 COLL VENOUS BLD VENIPUNCTURE: CPT

## 2022-09-01 PROCEDURE — 86140 C-REACTIVE PROTEIN: CPT

## 2022-09-01 PROCEDURE — 80053 COMPREHEN METABOLIC PANEL: CPT

## 2022-09-01 PROCEDURE — 74011636637 HC RX REV CODE- 636/637: Performed by: INTERNAL MEDICINE

## 2022-09-01 PROCEDURE — 65270000029 HC RM PRIVATE

## 2022-09-01 PROCEDURE — 94640 AIRWAY INHALATION TREATMENT: CPT

## 2022-09-01 PROCEDURE — 83735 ASSAY OF MAGNESIUM: CPT

## 2022-09-01 PROCEDURE — 85025 COMPLETE CBC W/AUTO DIFF WBC: CPT

## 2022-09-01 PROCEDURE — 94761 N-INVAS EAR/PLS OXIMETRY MLT: CPT

## 2022-09-01 PROCEDURE — 74011250636 HC RX REV CODE- 250/636: Performed by: INTERNAL MEDICINE

## 2022-09-01 PROCEDURE — 82962 GLUCOSE BLOOD TEST: CPT

## 2022-09-01 PROCEDURE — 77010033678 HC OXYGEN DAILY

## 2022-09-01 PROCEDURE — 74011250637 HC RX REV CODE- 250/637: Performed by: INTERNAL MEDICINE

## 2022-09-01 RX ORDER — ACETYLCYSTEINE 200 MG/ML
400 SOLUTION ORAL; RESPIRATORY (INHALATION)
Status: DISCONTINUED | OUTPATIENT
Start: 2022-09-01 | End: 2022-09-04

## 2022-09-01 RX ORDER — GUAIFENESIN 600 MG/1
600 TABLET, EXTENDED RELEASE ORAL EVERY 12 HOURS
Status: DISCONTINUED | OUTPATIENT
Start: 2022-09-01 | End: 2022-09-03

## 2022-09-01 RX ADMIN — MONTELUKAST 10 MG: 10 TABLET, FILM COATED ORAL at 09:19

## 2022-09-01 RX ADMIN — METHYLPREDNISOLONE SODIUM SUCCINATE 40 MG: 40 INJECTION, POWDER, FOR SOLUTION INTRAMUSCULAR; INTRAVENOUS at 15:02

## 2022-09-01 RX ADMIN — IPRATROPIUM BROMIDE AND ALBUTEROL SULFATE 3 ML: 2.5; .5 SOLUTION RESPIRATORY (INHALATION) at 21:28

## 2022-09-01 RX ADMIN — DULOXETINE HYDROCHLORIDE 20 MG: 20 CAPSULE, DELAYED RELEASE ORAL at 09:19

## 2022-09-01 RX ADMIN — IPRATROPIUM BROMIDE AND ALBUTEROL SULFATE 3 ML: 2.5; .5 SOLUTION RESPIRATORY (INHALATION) at 07:29

## 2022-09-01 RX ADMIN — CETIRIZINE HYDROCHLORIDE 10 MG: 10 TABLET, FILM COATED ORAL at 09:20

## 2022-09-01 RX ADMIN — METHYLPREDNISOLONE SODIUM SUCCINATE 40 MG: 40 INJECTION, POWDER, FOR SOLUTION INTRAMUSCULAR; INTRAVENOUS at 22:19

## 2022-09-01 RX ADMIN — ACETYLCYSTEINE 400 MG: 200 SOLUTION ORAL; RESPIRATORY (INHALATION) at 13:27

## 2022-09-01 RX ADMIN — GUAIFENESIN 600 MG: 600 TABLET, EXTENDED RELEASE ORAL at 22:20

## 2022-09-01 RX ADMIN — METHYLPREDNISOLONE SODIUM SUCCINATE 40 MG: 40 INJECTION, POWDER, FOR SOLUTION INTRAMUSCULAR; INTRAVENOUS at 06:14

## 2022-09-01 RX ADMIN — INSULIN LISPRO 2 UNITS: 100 INJECTION, SOLUTION INTRAVENOUS; SUBCUTANEOUS at 12:39

## 2022-09-01 RX ADMIN — IPRATROPIUM BROMIDE AND ALBUTEROL SULFATE 3 ML: 2.5; .5 SOLUTION RESPIRATORY (INHALATION) at 13:27

## 2022-09-01 RX ADMIN — ASPIRIN 81 MG: 81 TABLET, COATED ORAL at 09:19

## 2022-09-01 RX ADMIN — PRAMIPEXOLE DIHYDROCHLORIDE 0.5 MG: 0.12 TABLET ORAL at 22:22

## 2022-09-01 RX ADMIN — POLYETHYLENE GLYCOL 3350 17 G: 17 POWDER, FOR SOLUTION ORAL at 22:31

## 2022-09-01 RX ADMIN — FUROSEMIDE 40 MG: 10 INJECTION, SOLUTION INTRAMUSCULAR; INTRAVENOUS at 20:47

## 2022-09-01 RX ADMIN — ATORVASTATIN CALCIUM 40 MG: 40 TABLET, FILM COATED ORAL at 09:20

## 2022-09-01 RX ADMIN — PANTOPRAZOLE SODIUM 40 MG: 40 TABLET, DELAYED RELEASE ORAL at 09:20

## 2022-09-01 RX ADMIN — LEVOFLOXACIN 750 MG: 5 INJECTION, SOLUTION INTRAVENOUS at 18:23

## 2022-09-01 RX ADMIN — SODIUM CHLORIDE, PRESERVATIVE FREE 10 ML: 5 INJECTION INTRAVENOUS at 15:02

## 2022-09-01 RX ADMIN — INSULIN LISPRO 4 UNITS: 100 INJECTION, SOLUTION INTRAVENOUS; SUBCUTANEOUS at 18:17

## 2022-09-01 RX ADMIN — IPRATROPIUM BROMIDE AND ALBUTEROL SULFATE 3 ML: 2.5; .5 SOLUTION RESPIRATORY (INHALATION) at 02:01

## 2022-09-01 RX ADMIN — ACETAMINOPHEN 650 MG: 325 TABLET, FILM COATED ORAL at 01:45

## 2022-09-01 RX ADMIN — INSULIN LISPRO 2 UNITS: 100 INJECTION, SOLUTION INTRAVENOUS; SUBCUTANEOUS at 09:19

## 2022-09-01 RX ADMIN — POTASSIUM CHLORIDE 10 MEQ: 750 TABLET, FILM COATED, EXTENDED RELEASE ORAL at 09:19

## 2022-09-01 RX ADMIN — METHYLPREDNISOLONE SODIUM SUCCINATE 40 MG: 40 INJECTION, POWDER, FOR SOLUTION INTRAMUSCULAR; INTRAVENOUS at 01:45

## 2022-09-01 RX ADMIN — FUROSEMIDE 40 MG: 10 INJECTION, SOLUTION INTRAMUSCULAR; INTRAVENOUS at 09:20

## 2022-09-01 RX ADMIN — GUAIFENESIN 600 MG: 600 TABLET, EXTENDED RELEASE ORAL at 12:40

## 2022-09-01 RX ADMIN — SODIUM CHLORIDE, PRESERVATIVE FREE 10 ML: 5 INJECTION INTRAVENOUS at 07:18

## 2022-09-01 RX ADMIN — ACETAMINOPHEN 650 MG: 325 TABLET, FILM COATED ORAL at 09:19

## 2022-09-01 RX ADMIN — ACETYLCYSTEINE 400 MG: 200 SOLUTION ORAL; RESPIRATORY (INHALATION) at 21:29

## 2022-09-01 RX ADMIN — TRAZODONE HYDROCHLORIDE 50 MG: 50 TABLET ORAL at 22:32

## 2022-09-01 RX ADMIN — PRAMIPEXOLE DIHYDROCHLORIDE 0.5 MG: 0.12 TABLET ORAL at 01:45

## 2022-09-01 RX ADMIN — SODIUM CHLORIDE, PRESERVATIVE FREE 10 ML: 5 INJECTION INTRAVENOUS at 20:52

## 2022-09-01 RX ADMIN — RIVAROXABAN 10 MG: 10 TABLET, FILM COATED ORAL at 09:19

## 2022-09-01 NOTE — PROGRESS NOTES
Patient arrived to unit via transport chair X 1 ER staff member; with oxygen via nasal cannula in place. No voiced complaints of pain alert and orient X 3 ambulating to bathroom without any complaints. Hard of hearing in right ear only and dual skin assessment completed with Kaylan MERLOS no open areas noted at this time. Call bell within reach.

## 2022-09-01 NOTE — ED NOTES
.. TRANSFER - OUT REPORT:    Verbal report given to Uche RN(name) on Jeff Farley  being transferred to [facility](unit) for routine progression of care       Report consisted of patients Situation, Background, Assessment and   Recommendations(SBAR). Information from the following report(s) SBAR was reviewed with the receiving nurse. Lines:   Peripheral IV 08/31/22 Left;Posterior Forearm (Active)        Opportunity for questions and clarification was provided.       Patient transported with:   [employer]

## 2022-09-01 NOTE — PROGRESS NOTES
Comprehensive Nutrition Assessment    Type and Reason for Visit: Initial (MST 2)  Pmhx includes R breast CA, COPD, stroke. PSH Breast Lumpectomy(2015). NKFA. Nutrition Recommendations/Plan:   Diet and snacks as ordered  Monitor and record po intake, weights, and BM's in I/o's     Malnutrition Assessment:  Malnutrition Status:  No malnutrition (09/01/22 1422)    Context:  Acute illness         Nutrition Assessment:    Presents to ED with worsening SOB, chest tightness, cough, chill and dry cough. Known hx of COPD and is on Home 02 3L. Failed outpt mgmt. No evidence of PE, but with b/l atelectasis. Pt visited during breathing treatment. She reports appetite is fine, no recent weight changes. Admits to trying to lose weight by changing her diet. Meds atorvastatin, lipitor, cymbalta, lasix, insulin lispro, levaquin, solumedrol, pantoprazole, KCL, xarelto. Labs 8/31  9/1 Wbc 11.7. Nutrition Related Findings:    Well nourished. No n/v/c/d or c/s problems. No edema. No BM recorded. Wound Type: None      Current Nutrition Intake & Therapies:  ADULT DIET Regular; 4 carb choices (60 gm/meal); Low Fat/Low Chol/High Fiber/2 gm Na  ADULT ORAL NUTRITION SUPPLEMENT AM Snack, PM Snack, HS Snack; Diabetic Supplement    Anthropometric Measures:  Height: 5' 8\" (172.7 cm)  Ideal Body Weight (IBW): 140 lbs (64 kg)  Current Body Wt:  107 kg (235 lb 14.3 oz), 168.5 % IBW. Stated  Current BMI (kg/m2): 35.9  Weight Adjustment: No adjustment  BMI Category: Obese class 2 (BMI 35.0-39. 9)    Estimated Daily Nutrient Needs:  Energy Requirements Based On: Kcal/kg  Weight Used for Energy Requirements: Adjusted (74.4 kg adjusted)  Energy (kcal/day): 1860 kcal (25kcal/kg)  Weight Used for Protein Requirements: Adjusted (74.4 kg adj)  Protein (g/day): 74 g (1.0 g/kg)  Method Used for Fluid Requirements: ml/kg  Fluid (ml/day): 1860 ml (25 ml/kg)    Nutrition Diagnosis:   No nutrition diagnosis at this time related to   as evidenced by Nutrition Interventions:   Food and/or Nutrient Delivery: Continue current diet, Snacks (specify)  Nutrition Education/Counseling: No recommendations at this time  Coordination of Nutrition Care: Continue to monitor while inpatient       Goals:  Previous Goal Met: Progressing toward goal(s)  Goals: Meet at least 75% of estimated needs, prior to discharge       Nutrition Monitoring and Evaluation:   Behavioral-Environmental Outcomes: None identified  Food/Nutrient Intake Outcomes: Food and nutrient intake  Physical Signs/Symptoms Outcomes: Biochemical data, Nutrition focused physical findings, Weight    Discharge Planning:    No discharge needs at this time    Jame Morse, [address]: [id number]

## 2022-09-01 NOTE — PROGRESS NOTES
PULMONARY CONSULT  [facility]    Name: Radha Campa MRN: [id number]   : 1955 Hospital: [facility]   Date: 2022  Admission date: 2022 Hospital Day: 2       HPI:     Hospital Problems  Date Reviewed: 8/3/2022            Codes Class Noted POA    COPD with acute exacerbation [facility]) ICD-10-CM: J44.1  ICD-9-CM: 491.21  2022 Unknown                [x] High complexity decision making was performed  [x] See my orders for details      Subjective/Initial History:     I was asked by Tito Dumont MD to see Radha Campa  a 61 y.o.    female in consultation     Excerpts from admission 2022 or consult notes as follows:         No Known Allergies     MAR reviewed and pertinent medications noted or modified as needed     Current Facility-Administered Medications   Medication    aspirin delayed-release tablet 81 mg    atorvastatin (LIPITOR) tablet 40 mg    DULoxetine (CYMBALTA) capsule 20 mg    cetirizine (ZYRTEC) tablet 10 mg    montelukast (SINGULAIR) tablet 10 mg    pantoprazole (PROTONIX) tablet 40 mg    potassium chloride SR (KLOR-CON 10) tablet 10 mEq    pramipexole (MIRAPEX) tablet 0.5 mg    tiotropium bromide (SPIRIVA RESPIMAT) 2.5 mcg /actuation    rivaroxaban (XARELTO) tablet 10 mg    traZODone (DESYREL) tablet 50 mg    albuterol-ipratropium (DUO-NEB) 2.5 MG-0.5 MG/3 ML    levoFLOXacin (LEVAQUIN) 750 mg in D5W IVPB    insulin lispro (HUMALOG) injection    glucose chewable tablet 16 g    glucagon (GLUCAGEN) injection 1 mg    dextrose 10% infusion 0-250 mL    sodium chloride (NS) flush 5-40 mL    sodium chloride (NS) flush 5-40 mL    acetaminophen (TYLENOL) tablet 650 mg    Or    acetaminophen (TYLENOL) suppository 650 mg    polyethylene glycol (MIRALAX) packet 17 g    ondansetron (ZOFRAN ODT) tablet 4 mg    Or    ondansetron (ZOFRAN) injection 4 mg    furosemide (LASIX) injection 40 mg    methylPREDNISolone (PF) (SOLU-MEDROL) injection 40 mg      Patient PCP: Brianna Auguste DO  PMH:  has a past medical history of Breast cancer ([facility] [address] (2015), Chronic obstructive pulmonary disease ([facility] [address] and Stroke (cerebrum) ([facility] [address] (2015). PSH:   has a past surgical history that includes hx breast lumpectomy (Right, 2015) and hx heart catheterization. FHX: family history includes Breast Cancer in her sister. SHX:  reports that she quit smoking about 6 years ago. Her smoking use included cigarettes. She has a 30.00 pack-year smoking history. She has never used smokeless tobacco. She reports that she does not currently use alcohol. She reports that she does not use drugs. ROS:    Review of Systems   Respiratory:  Positive for shortness of breath and wheezing. All other systems reviewed and are negative. Objective:     Vital Signs: Telemetry:    normal sinus rhythm Intake/Output:   Visit Vitals  BP (!) 111/57 (BP 1 Location: Left upper arm, BP Patient Position: At rest)   Pulse 76   Temp 97.7 °F (36.5 °C)   Resp 17   Ht 5' 8\" (1.727 m)   Wt 107 kg (236 lb)   SpO2 90%   Breastfeeding No   BMI 35.88 kg/m²       Temp (24hrs), Av °F (36.7 °C), Min:97.6 °F (36.4 °C), Max:98.5 °F (36.9 °C)        O2 Device: Nasal cannula O2 Flow Rate (L/min): 3 l/min       Wt Readings from Last 4 Encounters:   22 107 kg (236 lb)   22 109.3 kg (241 lb)   22 113.4 kg (250 lb)   22 113.4 kg (250 lb)        No intake or output data in the 24 hours ending 22 0933    Last shift:      No intake/output data recorded. Last 3 shifts: No intake/output data recorded. Physical Exam:     Physical Exam  Constitutional:       Appearance: She is obese. HENT:      Head: Normocephalic. Right Ear: Tympanic membrane, ear canal and external ear normal.      Left Ear: Tympanic membrane, ear canal and external ear normal.      Nose: Nose normal.      Mouth/Throat:      Mouth: Mucous membranes are moist.      Pharynx: Oropharynx is clear.    Eyes:      Extraocular Movements: Extraocular movements intact. Conjunctiva/sclera: Conjunctivae normal.      Pupils: Pupils are equal, round, and reactive to light. Cardiovascular:      Rate and Rhythm: Normal rate and regular rhythm. Pulses: Normal pulses. Heart sounds: Normal heart sounds. Pulmonary:      Effort: Pulmonary effort is normal.      Breath sounds: Normal breath sounds. Abdominal:      General: Abdomen is flat. Bowel sounds are normal.      Palpations: Abdomen is soft. Musculoskeletal:         General: Normal range of motion. Cervical back: Normal range of motion and neck supple. Skin:     General: Skin is warm and dry. Capillary Refill: Capillary refill takes less than 2 seconds. Neurological:      General: No focal deficit present. Mental Status: She is alert and oriented to person, place, and time. Mental status is at baseline. Psychiatric:         Mood and Affect: Mood normal.         Behavior: Behavior normal.         Thought Content: Thought content normal.         Judgment: Judgment normal.        Labs:    Recent Labs     09/01/22  0744 08/31/22  1356   WBC 11.7* 10.8   HGB 13.3 13.8   * 145*     Recent Labs     08/31/22  1356      K 3.9      CO2 30   GLU 99   BUN 12   CREA 0.61   CA 9.5     No results for input(s): PH, PCO2, PO2, HCO3, FIO2 in the last 72 hours. No results for input(s): CPK, CKNDX, TROIQ in the last 72 hours. No lab exists for component: CPKMB  No results found for: BNPP, BNP   No results found for: CULTNo results found for: TSH, TSHEXT    Imaging:    CXR Results  (Last 48 hours)                 08/31/22 1405  XR CHEST SNGL V Final result    Impression:  No interval change. Narrative:  INDICATION: Cough, headache, back pain, dyspnea, chest pain and burning. Portable AP view of the chest.       Direct comparison made to prior chest x-ray dated December 2021. Cardiomediastinal silhouette is stable.  There are very mild bilateral increased   interstitial markings. There is no new focal airspace process. No pleural fluid   is visualized. There is no pneumothorax. Results from [place] encounter on 08/31/22    XR CHEST SNGL V    Narrative  INDICATION: Cough, headache, back pain, dyspnea, chest pain and burning. Portable AP view of the chest.    Direct comparison made to prior chest x-ray dated December 2021. Cardiomediastinal silhouette is stable. There are very mild bilateral increased  interstitial markings. There is no new focal airspace process. No pleural fluid  is visualized. There is no pneumothorax. Impression  No interval change. Results from Hospital Encounter encounter on 12/17/21    XR CHEST PA LAT    Narrative  Examination: XR CHEST PA LAT    History: COPD    Comparison: Chest radiograph 6/16/2021    FINDINGS:    2 views of the chest. Coarse interstitial lung markings diffusely could be  related to provided clinical history of COPD. No definite focal airspace  consolidation, pneumothorax, or significant pleural effusion. Cardiac silhouette  is normal in size. Mediastinal contours and osseous structures appear unchanged  without acute abnormality evident. Impression  Diffuse coarsened interstitial lung markings appearing similar to prior. No  findings of acute cardiopulmonary abnormality. Results from [place] encounter on 06/16/21    XR CHEST PA LAT    Narrative  Chest, 2 views, 6/16/2021    History: Chronic obstructive pulmonary disease. Comparison: Including chest 10/29/2020. Findings: The cardiac silhouette is within normal limits. The lungs are  adequately expanded. No hydrostatic edema is present. Reticular opacities in  the lungs and peribronchial thickening are not significantly changed compared to  chest 10/29/2020. No focal consolidation, pleural effusions or pneumothorax is  identified.    Degenerative changes are present in the thoracic spine.    Impression  Reticular opacities and peribronchial thickening, not significantly  changed. No focal airspace consolidation is identified. Results from [place] encounter on 08/31/22    CTA CHEST W OR W WO CONT    Narrative  INDICATION:  Hypoxia; hx DVT/PE    EXAM:  CTA Chest with contrast for Pulmonary Embolus    COMPARISON:  10/29/2020    TECHNIQUE:  Following the uneventful intravenous administration of Iodinated  contrast, thin helical axial images were obtained through the chest. 3D image  postprocessing was performed. Coronal and sagittal reformatted images were  obtained. CT dose reduction was achieved through use of a standardized protocol  tailored for this examination and automatic exposure control for dose  modulation. FINDINGS:    No evidence for acute central pulmonary embolus. Limited evaluation of the  segmental pulmonary arteries bilaterally due to respiratory motion artifact. No evidence for thoracic aortic dissection. No pleural or pericardial effusion. Mildly enlarged left paratracheal lymph node appears larger measuring 14 mm  (image 27), compared to 8 mm previously. Scattered bilateral subsegmental atelectasis. No other significant pulmonary  abnormality. The central airways are patent    Nonspecific right breast subareolar low-density focus with calcification  measuring 17 mm. Mild to moderate degenerative changes in the spine    Impression  1. No evidence for acute central pulmonary embolus. Limited evaluation of the  segmental pulmonary arteries bilaterally due to respiratory motion artifact. 2. Bilateral subsegmental atelectasis without evidence for any other significant  pulmonary abnormality. 3. Slight increase in size of mildly enlarged left paratracheal superior  mediastinal lymph node. 4. Stable nonspecific right breast subareolar low-density lesion with  calcification. 5. Please refer to above findings for complete details.         IMPRESSION: Chronic Obstructive Pulmonary Disease with Severe Acute Exacerbation requiring inpatient hospitalization and management; has very poor airway clearance. Increased work of breathing  Body mass index is 35.88 kg/m². Additional workup outlined below      RECOMMENDATIONS/PLAN:     Supplemental O2 to keep sats > 93%  Continue with albuterol-ipratropium. 9/1 Patient is a well-appearing 62 yo female that presented to the ED yesterday for shortness of breath and chest pain. Today she reports doing much better and is able to breathe well after being given albuterol-ipratropium. She was able to sleep soundly through the night. CT from yesterday reveals no evidence of acute PE. She does have bilateral atelectasis. Mild neutrophilia as expected after being given methylprednisolone.        Dameon Perdomo

## 2022-09-01 NOTE — PROGRESS NOTES
Bedside shift change report given to [facility] (oncoming nurse) by Maximo Waggoner (offgoing nurse). Report included the following information SBAR, MAR, and Recent Results.

## 2022-09-01 NOTE — PROGRESS NOTES
Hospitalist Progress Note    Subjective:   Daily Progress Note: 9/1/2022 6:15 PM    Admission HPI/Hx:  Mabel Carr is a 61 y.o. female who presents with worsening SOB, chest tightness, cough, chills, dry cough. Patient was seen at [facility] ER 5 days ago and prescribed Amoxicillin + Zithromax and Prednisone. She has known hx of COPD and is on Home O2 3L; follows with Dr. Alonzo Garcia. She has not received any Covid Vaccine. She states that she was tested for Covid at [facility]. Additionally, increased swelling. No known Cardiac Hx. Patient is on Xarelto for remote hx of DVT/PE; has had previous epistaxis but not recently. In ER patient required NRB due to severe hypoxia.  ----------------  Subjective: Breathing better today. No CP, Nausea.     Current Facility-Administered Medications   Medication Dose Route Frequency    acetylcysteine (MUCOMYST) 200 mg/mL (20 %) solution 400 mg  400 mg Nebulization Q6HWA RT    guaiFENesin ER (MUCINEX) tablet 600 mg  600 mg Oral Q12H    aspirin delayed-release tablet 81 mg  81 mg Oral DAILY    atorvastatin (LIPITOR) tablet 40 mg  40 mg Oral DAILY    DULoxetine (CYMBALTA) capsule 20 mg  20 mg Oral DAILY    cetirizine (ZYRTEC) tablet 10 mg  10 mg Oral DAILY    montelukast (SINGULAIR) tablet 10 mg  10 mg Oral DAILY    pantoprazole (PROTONIX) tablet 40 mg  40 mg Oral DAILY    potassium chloride SR (KLOR-CON 10) tablet 10 mEq  10 mEq Oral DAILY    pramipexole (MIRAPEX) tablet 0.5 mg  0.5 mg Oral QHS    tiotropium bromide (SPIRIVA RESPIMAT) 2.5 mcg /actuation  2 Puff Inhalation DAILY    rivaroxaban (XARELTO) tablet 10 mg  10 mg Oral DAILY WITH BREAKFAST    traZODone (DESYREL) tablet 50 mg  50 mg Oral QHS    albuterol-ipratropium (DUO-NEB) 2.5 MG-0.5 MG/3 ML  3 mL Nebulization Q6H RT    levoFLOXacin (LEVAQUIN) 750 mg in D5W IVPB  750 mg IntraVENous Q24H    insulin lispro (HUMALOG) injection   SubCUTAneous AC&HS    glucose chewable tablet 16 g  4 Tablet Oral PRN    glucagon (GLUCAGEN) injection 1 mg  1 mg IntraMUSCular PRN    dextrose 10% infusion 0-250 mL  0-250 mL IntraVENous PRN    sodium chloride (NS) flush 5-40 mL  5-40 mL IntraVENous Q8H    sodium chloride (NS) flush 5-40 mL  5-40 mL IntraVENous PRN    acetaminophen (TYLENOL) tablet 650 mg  650 mg Oral Q6H PRN    Or    acetaminophen (TYLENOL) suppository 650 mg  650 mg Rectal Q6H PRN    polyethylene glycol (MIRALAX) packet 17 g  17 g Oral DAILY PRN    ondansetron (ZOFRAN ODT) tablet 4 mg  4 mg Oral Q8H PRN    Or    ondansetron (ZOFRAN) injection 4 mg  4 mg IntraVENous Q6H PRN    furosemide (LASIX) injection 40 mg  40 mg IntraVENous BID    methylPREDNISolone (PF) (SOLU-MEDROL) injection 40 mg  40 mg IntraVENous Q8H        Review of Systems    Constitutional: No - fever, chills, weight change    HEENT: No - sore throat, sinus pressure, ear pain    Respiratory:  + sob, wheeze    Cardiovascular: No - chest pain, palpitations, extremity edema    Gastrointestinal: No - n/v/d/bleeding/abd pain    Genitourinary: No - dysuria, hematuria    Neurological: No - headache, focal weakness, LOC, paresthesia    Skin: No - new rash, discoloration    M/S: No - joint pain, back pain, injury      Objective:     Visit Vitals  BP (!) 111/57 (BP 1 Location: Left upper arm, BP Patient Position: At rest)   Pulse 76   Temp 97.7 °F (36.5 °C)   Resp 17   Ht 5' 8\" (1.727 m)   Wt 107 kg (236 lb)   SpO2 93%   Breastfeeding No   BMI 35.88 kg/m²    O2 Flow Rate (L/min): 3.5 l/min O2 Device: Nasal cannula    Temp (24hrs), Av °F (36.7 °C), Min:97.6 °F (36.4 °C), Max:98.5 °F (36.9 °C)      PHYSICAL EXAM    Constitutional: NAD, Awake; sitting bedside    HEENT: No lesions, rash    Neck: Supple     Cardiovascular: S1S2, no murmur    Respiratory:  Bilat post wheeze and poor breath sounds     GI: Soft, NT; no rigidity; + bowel sounds    : voiding, clear yellow urine    Musculoskeletal: Moving all extremities spontaneously; no overt injury; no significant joint swelling    Neurological:  AxOx2; No new focal weakness; No tremors    Psychiatric: Appropriate mood; oriented    Skin: No new rash or significant discoloration     Vascular: Palpable pulses; No edema      Data Review    Recent Results (from the past 24 hour(s))   GLUCOSE, POC    Collection Time: 08/31/22  6:22 PM   Result Value Ref Range    Glucose (POC) 156 (H) 65 - 100 mg/dL    Performed by Isaias Butcher    GLUCOSE, POC    Collection Time: 08/31/22  9:18 PM   Result Value Ref Range    Glucose (POC) 215 (H) 65 - 100 mg/dL    Performed by RONNA LU    METABOLIC PANEL, COMPREHENSIVE    Collection Time: 09/01/22  7:44 AM   Result Value Ref Range    Sodium 137 136 - 145 mmol/L    Potassium 4.1 3.5 - 5.1 mmol/L    Chloride 99 97 - 108 mmol/L    CO2 31 21 - 32 mmol/L    Anion gap 7 5 - 15 mmol/L    Glucose 169 (H) 65 - 100 mg/dL    BUN 15 6 - 20 mg/dL    Creatinine 0.77 0.55 - 1.02 mg/dL    BUN/Creatinine ratio 19 12 - 20      GFR est AA >60 >60 ml/min/1.73m2    GFR est non-AA >60 >60 ml/min/1.73m2    Calcium 9.3 8.5 - 10.1 mg/dL    Bilirubin, total 0.6 0.2 - 1.0 mg/dL    AST (SGOT) 49 (H) 15 - 37 U/L    ALT (SGPT) 90 (H) 12 - 78 U/L    Alk.  phosphatase 54 45 - 117 U/L    Protein, total 6.6 6.4 - 8.2 g/dL    Albumin 3.4 (L) 3.5 - 5.0 g/dL    Globulin 3.2 2.0 - 4.0 g/dL    A-G Ratio 1.1 1.1 - 2.2     CBC WITH AUTOMATED DIFF    Collection Time: 09/01/22  7:44 AM   Result Value Ref Range    WBC 11.7 (H) 3.6 - 11.0 K/uL    RBC 4.56 3.80 - 5.20 M/uL    HGB 13.3 11.5 - 16.0 g/dL    HCT 40.6 35.0 - 47.0 %    MCV 89.0 80.0 - 99.0 FL    MCH 29.2 26.0 - 34.0 PG    MCHC 32.8 30.0 - 36.5 g/dL    RDW 15.1 (H) 11.5 - 14.5 %    PLATELET 417 (L) 844 - 400 K/uL    MPV 11.8 8.9 - 12.9 FL    NRBC 0.0 0.0  WBC    ABSOLUTE NRBC 0.00 0.00 - 0.01 K/uL    NEUTROPHILS 81 (H) 32 - 75 %    LYMPHOCYTES 14 12 - 49 %    MONOCYTES 4 (L) 5 - 13 %    EOSINOPHILS 0 0 - 7 %    BASOPHILS 0 0 - 1 %    IMMATURE GRANULOCYTES 1 (H) 0 - 0.5 % ABS. NEUTROPHILS 9.6 (H) 1.8 - 8.0 K/UL    ABS. LYMPHOCYTES 1.6 0.8 - 3.5 K/UL    ABS. MONOCYTES 0.4 0.0 - 1.0 K/UL    ABS. EOSINOPHILS 0.0 0.0 - 0.4 K/UL    ABS. BASOPHILS 0.0 0.0 - 0.1 K/UL    ABS. IMM. GRANS. 0.1 (H) 0.00 - 0.04 K/UL    DF AUTOMATED     C REACTIVE PROTEIN, QT    Collection Time: 09/01/22  7:44 AM   Result Value Ref Range    C-Reactive protein 1.30 (H) 0.00 - 0.60 mg/dL   MAGNESIUM    Collection Time: 09/01/22  7:44 AM   Result Value Ref Range    Magnesium 2.2 1.6 - 2.4 mg/dL   PROCALCITONIN    Collection Time: 09/01/22  7:44 AM   Result Value Ref Range    Procalcitonin <0.05 (H) 0 ng/mL   GLUCOSE, POC    Collection Time: 09/01/22  8:12 AM   Result Value Ref Range    Glucose (POC) 186 (H) 65 - 100 mg/dL    Performed by Park Barillas ([place])    GLUCOSE, POC    Collection Time: 09/01/22 11:00 AM   Result Value Ref Range    Glucose (POC) 179 (H) 65 - 100 mg/dL    Performed by Yamilet Zhu, POC    Collection Time: 09/01/22  5:16 PM   Result Value Ref Range    Glucose (POC) 221 (H) 65 - 100 mg/dL    Performed by Bianka Underwood        CTA CHEST W OR W WO CONT   Final Result      1. No evidence for acute central pulmonary embolus. Limited evaluation of the   segmental pulmonary arteries bilaterally due to respiratory motion artifact. 2. Bilateral subsegmental atelectasis without evidence for any other significant   pulmonary abnormality. 3. Slight increase in size of mildly enlarged left paratracheal superior   mediastinal lymph node. 4. Stable nonspecific right breast subareolar low-density lesion with   calcification. 5. Please refer to above findings for complete details. XR CHEST SNGL V   Final Result   No interval change. Active Problems:    COPD with acute exacerbation ([address] (8/31/2022)        Assessment/Plan:     # Acute/Chronic Hypoxemic Resp Failure with hx of Chronic O2, COPD, DVT/PE on Xarelto.  Failed outpatient mgmt with Prednisone, Amoxil/Zithro     => Cont IV Solumedrol, Nebs, O2, Empiric Levaquin  => CTA Chest (given hx of PE): Negative for PE; Bilat Atelectasis  => Echo pending; Mild BNP elevation --> IV lasix  => Continue Xarelto     Hx of \"Stroke\", HTN, Breast CA, HLD, Depression/Anxiety      DVT Prophylaxis: On AC  Code Status: Full Code    Care Plan discussed with: Patient  ________________________________________  Mahogany MD Shashank

## 2022-09-01 NOTE — PROGRESS NOTES
PULMONARY CONSULT  [facility]    Name: Yevgeniy Wheeler MRN: [id number]   : 1955 Hospital: [facility]   Date: 2022  Admission date: 2022 Hospital Day: 2       HPI:     Hospital Problems  Date Reviewed: 8/3/2022            Codes Class Noted POA    COPD with acute exacerbation [facility]) ICD-10-CM: J44.1  ICD-9-CM: 491.21  2022 Unknown              [x] High complexity decision making was performed  [x] See my orders for details      Subjective/Initial History:     I was asked by Amado Hernández MD to see Yevgeniy Wheeler  a 61 y.o.  female in consultation     Excerpts from admission 2022 or consult notes as follows:   53-year-old lady came in with other shortness of breath and dyspnea also having chest tightness cough she was complaining of dyspnea for the past week went to [facility] received antibiotic did not seem to help she is chronically on home oxygen follows with me regarding her oxygen and COPD she came to the hospital got admitted she had a history of PE in the past on Xarelto.   So admitted and pulmonary consult was called      No Known Allergies     MAR reviewed and pertinent medications noted or modified as needed     Current Facility-Administered Medications   Medication    aspirin delayed-release tablet 81 mg    atorvastatin (LIPITOR) tablet 40 mg    DULoxetine (CYMBALTA) capsule 20 mg    cetirizine (ZYRTEC) tablet 10 mg    montelukast (SINGULAIR) tablet 10 mg    pantoprazole (PROTONIX) tablet 40 mg    potassium chloride SR (KLOR-CON 10) tablet 10 mEq    pramipexole (MIRAPEX) tablet 0.5 mg    tiotropium bromide (SPIRIVA RESPIMAT) 2.5 mcg /actuation    rivaroxaban (XARELTO) tablet 10 mg    traZODone (DESYREL) tablet 50 mg    albuterol-ipratropium (DUO-NEB) 2.5 MG-0.5 MG/3 ML    levoFLOXacin (LEVAQUIN) 750 mg in D5W IVPB    insulin lispro (HUMALOG) injection    glucose chewable tablet 16 g    glucagon (GLUCAGEN) injection 1 mg dextrose 10% infusion 0-250 mL    sodium chloride (NS) flush 5-40 mL    sodium chloride (NS) flush 5-40 mL    acetaminophen (TYLENOL) tablet 650 mg    Or    acetaminophen (TYLENOL) suppository 650 mg    polyethylene glycol (MIRALAX) packet 17 g    ondansetron (ZOFRAN ODT) tablet 4 mg    Or    ondansetron (ZOFRAN) injection 4 mg    furosemide (LASIX) injection 40 mg    methylPREDNISolone (PF) (SOLU-MEDROL) injection 40 mg      Patient PCP: Hunter Avila DO  PMH:  has a past medical history of Breast cancer ([facility] [address] (), Chronic obstructive pulmonary disease ([facility] [address] and Stroke (cerebrum) ([facility] [address] (2015). PSH:   has a past surgical history that includes hx breast lumpectomy (Right, ) and hx heart catheterization. FHX: family history includes Breast Cancer in her sister. SHX:  reports that she quit smoking about 6 years ago. Her smoking use included cigarettes. She has a 30.00 pack-year smoking history. She has never used smokeless tobacco. She reports that she does not currently use alcohol. She reports that she does not use drugs. ROS:    Review of Systems   Respiratory:  Positive for shortness of breath and wheezing. All other systems reviewed and are negative. Objective:     Vital Signs: Telemetry:    normal sinus rhythm Intake/Output:   Visit Vitals  BP (!) 111/57 (BP 1 Location: Left upper arm, BP Patient Position: At rest)   Pulse 76   Temp 97.7 °F (36.5 °C)   Resp 17   Ht 5' 8\" (1.727 m)   Wt 107 kg (236 lb)   SpO2 90%   Breastfeeding No   BMI 35.88 kg/m²       Temp (24hrs), Av °F (36.7 °C), Min:97.6 °F (36.4 °C), Max:98.5 °F (36.9 °C)        O2 Device: Nasal cannula O2 Flow Rate (L/min): 3.5 l/min       Wt Readings from Last 4 Encounters:   22 107 kg (236 lb)   22 109.3 kg (241 lb)   22 113.4 kg (250 lb)   22 113.4 kg (250 lb)        No intake or output data in the 24 hours ending 22 1045    Last shift:      No intake/output data recorded.   Last 3 shifts: No intake/output data recorded. Physical Exam:     Physical Exam  Constitutional:       Appearance: She is obese. HENT:      Head: Normocephalic. Right Ear: Tympanic membrane, ear canal and external ear normal.      Left Ear: Tympanic membrane, ear canal and external ear normal.      Nose: Nose normal.      Mouth/Throat:      Mouth: Mucous membranes are moist.      Pharynx: Oropharynx is clear. Eyes:      Extraocular Movements: Extraocular movements intact. Conjunctiva/sclera: Conjunctivae normal.      Pupils: Pupils are equal, round, and reactive to light. Cardiovascular:      Rate and Rhythm: Normal rate and regular rhythm. Pulses: Normal pulses. Heart sounds: Normal heart sounds. Pulmonary:      Effort: Pulmonary effort is normal.      Breath sounds: Normal breath sounds. Abdominal:      General: Abdomen is flat. Bowel sounds are normal.      Palpations: Abdomen is soft. Musculoskeletal:         General: Normal range of motion. Cervical back: Normal range of motion and neck supple. Skin:     General: Skin is warm and dry. Capillary Refill: Capillary refill takes less than 2 seconds. Neurological:      General: No focal deficit present. Mental Status: She is alert and oriented to person, place, and time. Mental status is at baseline. Psychiatric:         Mood and Affect: Mood normal.         Behavior: Behavior normal.         Thought Content: Thought content normal.         Judgment: Judgment normal.        Labs:    Recent Labs     09/01/22  0744 08/31/22  1356   WBC 11.7* 10.8   HGB 13.3 13.8   * 145*       Recent Labs     08/31/22  1356      K 3.9      CO2 30   GLU 99   BUN 12   CREA 0.61   CA 9.5       No results for input(s): PH, PCO2, PO2, HCO3, FIO2 in the last 72 hours. No results for input(s): CPK, CKNDX, TROIQ in the last 72 hours.     No lab exists for component: CPKMB  No results found for: BNPP, BNP   No results found for: CULTNo results found for: TSH, TSHEXT, TSHEXT    Imaging:    CXR Results  (Last 48 hours)                 08/31/22 1405  XR CHEST SNGL V Final result    Impression:  No interval change. Narrative:  INDICATION: Cough, headache, back pain, dyspnea, chest pain and burning. Portable AP view of the chest.       Direct comparison made to prior chest x-ray dated December 2021. Cardiomediastinal silhouette is stable. There are very mild bilateral increased   interstitial markings. There is no new focal airspace process. No pleural fluid   is visualized. There is no pneumothorax. Results from [place] encounter on 08/31/22    XR CHEST SNGL V    Narrative  INDICATION: Cough, headache, back pain, dyspnea, chest pain and burning. Portable AP view of the chest.    Direct comparison made to prior chest x-ray dated December 2021. Cardiomediastinal silhouette is stable. There are very mild bilateral increased  interstitial markings. There is no new focal airspace process. No pleural fluid  is visualized. There is no pneumothorax. Impression  No interval change. Results from Hospital Encounter encounter on 12/17/21    XR CHEST PA LAT    Narrative  Examination: XR CHEST PA LAT    History: COPD    Comparison: Chest radiograph 6/16/2021    FINDINGS:    2 views of the chest. Coarse interstitial lung markings diffusely could be  related to provided clinical history of COPD. No definite focal airspace  consolidation, pneumothorax, or significant pleural effusion. Cardiac silhouette  is normal in size. Mediastinal contours and osseous structures appear unchanged  without acute abnormality evident. Impression  Diffuse coarsened interstitial lung markings appearing similar to prior. No  findings of acute cardiopulmonary abnormality.       Results from [place] encounter on 06/16/21    XR CHEST PA LAT    Narrative  Chest, 2 views, 6/16/2021    History: Chronic obstructive pulmonary disease. Comparison: Including chest 10/29/2020. Findings: The cardiac silhouette is within normal limits. The lungs are  adequately expanded. No hydrostatic edema is present. Reticular opacities in  the lungs and peribronchial thickening are not significantly changed compared to  chest 10/29/2020. No focal consolidation, pleural effusions or pneumothorax is  identified. Degenerative changes are present in the thoracic spine. Impression  Reticular opacities and peribronchial thickening, not significantly  changed. No focal airspace consolidation is identified. Results from [place] encounter on 08/31/22    CTA CHEST W OR W WO CONT    Narrative  INDICATION:  Hypoxia; hx DVT/PE    EXAM:  CTA Chest with contrast for Pulmonary Embolus    COMPARISON:  10/29/2020    TECHNIQUE:  Following the uneventful intravenous administration of Iodinated  contrast, thin helical axial images were obtained through the chest. 3D image  postprocessing was performed. Coronal and sagittal reformatted images were  obtained. CT dose reduction was achieved through use of a standardized protocol  tailored for this examination and automatic exposure control for dose  modulation. FINDINGS:    No evidence for acute central pulmonary embolus. Limited evaluation of the  segmental pulmonary arteries bilaterally due to respiratory motion artifact. No evidence for thoracic aortic dissection. No pleural or pericardial effusion. Mildly enlarged left paratracheal lymph node appears larger measuring 14 mm  (image 27), compared to 8 mm previously. Scattered bilateral subsegmental atelectasis. No other significant pulmonary  abnormality. The central airways are patent    Nonspecific right breast subareolar low-density focus with calcification  measuring 17 mm. Mild to moderate degenerative changes in the spine    Impression  1. No evidence for acute central pulmonary embolus.  Limited evaluation of the  segmental pulmonary arteries bilaterally due to respiratory motion artifact. 2. Bilateral subsegmental atelectasis without evidence for any other significant  pulmonary abnormality. 3. Slight increase in size of mildly enlarged left paratracheal superior  mediastinal lymph node. 4. Stable nonspecific right breast subareolar low-density lesion with  calcification. 5. Please refer to above findings for complete details. IMPRESSION:     Chronic Obstructive Pulmonary Disease with Severe Acute Exacerbation requiring inpatient hospitalization and management; has very poor airway clearance. Increased work of breathing  Body mass index is 35.88 kg/m². Additional workup outlined below  Acute on chronic hypoxic respiratory failure  History of breast cancer depression anxiety      RECOMMENDATIONS/PLAN:     IV Solu-Medrol nebulizer treatment CTA chest was negative for pulmonary embolism but shows atelectasis we will start patient on nebulizer treatment along with Mucomyst patient need pulmonary toilet agree with antibiotics  Supplemental O2 to keep sats > 93%  Continue with albuterol-ipratropium. 9/1 Patient is a well-appearing 60 yo female that presented to the ED yesterday for shortness of breath and chest pain. Today she reports doing much better and is able to breathe well after being given albuterol-ipratropium. She was able to sleep soundly through the night. CT from yesterday reveals no evidence of acute PE. She does have bilateral atelectasis. Mild neutrophilia as expected after being given methylprednisolone.        Heather Dumont MD

## 2022-09-02 ENCOUNTER — APPOINTMENT (OUTPATIENT)
Dept: NON INVASIVE DIAGNOSTICS | Age: 67
DRG: 190 | End: 2022-09-02
Attending: INTERNAL MEDICINE
Payer: MEDICARE

## 2022-09-02 LAB
ANION GAP SERPL CALC-SCNC: 6 MMOL/L (ref 5–15)
BUN SERPL-MCNC: 27 MG/DL (ref 6–20)
BUN/CREAT SERPL: 32 (ref 12–20)
CA-I BLD-MCNC: 10 MG/DL (ref 8.5–10.1)
CHLORIDE SERPL-SCNC: 98 MMOL/L (ref 97–108)
CO2 SERPL-SCNC: 32 MMOL/L (ref 21–32)
CREAT SERPL-MCNC: 0.85 MG/DL (ref 0.55–1.02)
ECHO AO ROOT DIAM: 2.4 CM
ECHO AO ROOT INDEX: 1.1 CM/M2
ECHO AV AREA PEAK VELOCITY: 1.7 CM2
ECHO AV AREA VTI: 1.8 CM2
ECHO AV AREA/BSA PEAK VELOCITY: 0.8 CM2/M2
ECHO AV AREA/BSA VTI: 0.8 CM2/M2
ECHO AV MEAN GRADIENT: 7 MMHG
ECHO AV MEAN VELOCITY: 1.2 M/S
ECHO AV PEAK GRADIENT: 13 MMHG
ECHO AV PEAK VELOCITY: 1.8 M/S
ECHO AV VELOCITY RATIO: 0.56
ECHO AV VTI: 30.9 CM
ECHO LA AREA 2C: 9.3 CM2
ECHO LA DIAMETER INDEX: 1.74 CM/M2
ECHO LA DIAMETER: 3.8 CM
ECHO LA MINOR AXIS: 4.3 CM
ECHO LA TO AORTIC ROOT RATIO: 1.58
ECHO LV E' LATERAL VELOCITY: 7 CM/S
ECHO LV E' SEPTAL VELOCITY: 8 CM/S
ECHO LV EDV A2C: 27 ML
ECHO LV EDV A4C: 54 ML
ECHO LV EDV INDEX A4C: 25 ML/M2
ECHO LV EDV NDEX A2C: 12 ML/M2
ECHO LV EJECTION FRACTION A2C: 62 %
ECHO LV EJECTION FRACTION A4C: 70 %
ECHO LV EJECTION FRACTION BIPLANE: 64 % (ref 55–100)
ECHO LV ESV A2C: 10 ML
ECHO LV ESV A4C: 16 ML
ECHO LV ESV INDEX A2C: 5 ML/M2
ECHO LV ESV INDEX A4C: 7 ML/M2
ECHO LV FRACTIONAL SHORTENING: 39 % (ref 28–44)
ECHO LV INTERNAL DIMENSION DIASTOLE INDEX: 2.47 CM/M2
ECHO LV INTERNAL DIMENSION DIASTOLIC: 5.4 CM (ref 3.9–5.3)
ECHO LV INTERNAL DIMENSION SYSTOLIC INDEX: 1.51 CM/M2
ECHO LV INTERNAL DIMENSION SYSTOLIC: 3.3 CM
ECHO LV IVSD: 1 CM (ref 0.6–0.9)
ECHO LV MASS 2D: 234.8 G (ref 67–162)
ECHO LV MASS INDEX 2D: 107.2 G/M2 (ref 43–95)
ECHO LV POSTERIOR WALL DIASTOLIC: 1.2 CM (ref 0.6–0.9)
ECHO LV RELATIVE WALL THICKNESS RATIO: 0.44
ECHO LVOT AREA: 3.1 CM2
ECHO LVOT AV VTI INDEX: 0.56
ECHO LVOT DIAM: 2 CM
ECHO LVOT MEAN GRADIENT: 2 MMHG
ECHO LVOT PEAK GRADIENT: 4 MMHG
ECHO LVOT PEAK VELOCITY: 1 M/S
ECHO LVOT STROKE VOLUME INDEX: 24.9 ML/M2
ECHO LVOT SV: 54.6 ML
ECHO LVOT VTI: 17.4 CM
ECHO MV A VELOCITY: 0.9 M/S
ECHO MV E VELOCITY: 0.58 M/S
ECHO MV E/A RATIO: 0.64
ECHO MV E/E' LATERAL: 8.29
ECHO MV E/E' RATIO (AVERAGED): 7.77
ECHO MV E/E' SEPTAL: 7.25
ECHO MV REGURGITANT PEAK GRADIENT: 6 MMHG
ECHO MV REGURGITANT PEAK VELOCITY: 1.2 M/S
ECHO PV MAX VELOCITY: 1.1 M/S
ECHO PV PEAK GRADIENT: 4 MMHG
ECHO RV BASAL DIMENSION: 3.3 CM
ECHO RV MID DIMENSION: 3.1 CM
ECHO RV TAPSE: 1.9 CM (ref 1.7–?)
ECHO TV REGURGITANT MAX VELOCITY: 1.57 M/S
ECHO TV REGURGITANT PEAK GRADIENT: 10 MMHG
GLUCOSE BLD STRIP.AUTO-MCNC: 190 MG/DL (ref 65–100)
GLUCOSE BLD STRIP.AUTO-MCNC: 226 MG/DL (ref 65–100)
GLUCOSE BLD STRIP.AUTO-MCNC: 236 MG/DL (ref 65–100)
GLUCOSE BLD STRIP.AUTO-MCNC: 296 MG/DL (ref 65–100)
GLUCOSE SERPL-MCNC: 204 MG/DL (ref 65–100)
PERFORMED BY, TECHID: ABNORMAL
POTASSIUM SERPL-SCNC: 4.6 MMOL/L (ref 3.5–5.1)
SODIUM SERPL-SCNC: 136 MMOL/L (ref 136–145)

## 2022-09-02 PROCEDURE — 94640 AIRWAY INHALATION TREATMENT: CPT

## 2022-09-02 PROCEDURE — 74011250637 HC RX REV CODE- 250/637: Performed by: HOSPITALIST

## 2022-09-02 PROCEDURE — 36415 COLL VENOUS BLD VENIPUNCTURE: CPT

## 2022-09-02 PROCEDURE — 65270000029 HC RM PRIVATE

## 2022-09-02 PROCEDURE — 93306 TTE W/DOPPLER COMPLETE: CPT

## 2022-09-02 PROCEDURE — 77010033678 HC OXYGEN DAILY

## 2022-09-02 PROCEDURE — 94668 MNPJ CHEST WALL SBSQ: CPT

## 2022-09-02 PROCEDURE — 80048 BASIC METABOLIC PNL TOTAL CA: CPT

## 2022-09-02 PROCEDURE — 74011250637 HC RX REV CODE- 250/637: Performed by: INTERNAL MEDICINE

## 2022-09-02 PROCEDURE — 74011000250 HC RX REV CODE- 250: Performed by: INTERNAL MEDICINE

## 2022-09-02 PROCEDURE — 74011636637 HC RX REV CODE- 636/637: Performed by: INTERNAL MEDICINE

## 2022-09-02 PROCEDURE — 94761 N-INVAS EAR/PLS OXIMETRY MLT: CPT

## 2022-09-02 PROCEDURE — 74011250636 HC RX REV CODE- 250/636: Performed by: INTERNAL MEDICINE

## 2022-09-02 PROCEDURE — 94667 MNPJ CHEST WALL 1ST: CPT

## 2022-09-02 PROCEDURE — 82962 GLUCOSE BLOOD TEST: CPT

## 2022-09-02 RX ORDER — DULOXETIN HYDROCHLORIDE 30 MG/1
30 CAPSULE, DELAYED RELEASE ORAL ONCE
Status: COMPLETED | OUTPATIENT
Start: 2022-09-02 | End: 2022-09-02

## 2022-09-02 RX ORDER — DULOXETIN HYDROCHLORIDE 30 MG/1
30 CAPSULE, DELAYED RELEASE ORAL
Status: COMPLETED | OUTPATIENT
Start: 2022-09-02 | End: 2022-09-02

## 2022-09-02 RX ORDER — INSULIN GLARGINE 100 [IU]/ML
10 INJECTION, SOLUTION SUBCUTANEOUS
Status: DISCONTINUED | OUTPATIENT
Start: 2022-09-02 | End: 2022-09-05 | Stop reason: HOSPADM

## 2022-09-02 RX ADMIN — DULOXETINE HYDROCHLORIDE 30 MG: 30 CAPSULE, DELAYED RELEASE ORAL at 22:51

## 2022-09-02 RX ADMIN — SODIUM CHLORIDE, PRESERVATIVE FREE 10 ML: 5 INJECTION INTRAVENOUS at 10:18

## 2022-09-02 RX ADMIN — INSULIN GLARGINE 10 UNITS: 100 INJECTION, SOLUTION SUBCUTANEOUS at 22:50

## 2022-09-02 RX ADMIN — METHYLPREDNISOLONE SODIUM SUCCINATE 40 MG: 40 INJECTION, POWDER, FOR SOLUTION INTRAMUSCULAR; INTRAVENOUS at 22:50

## 2022-09-02 RX ADMIN — IPRATROPIUM BROMIDE AND ALBUTEROL SULFATE 3 ML: 2.5; .5 SOLUTION RESPIRATORY (INHALATION) at 02:30

## 2022-09-02 RX ADMIN — INSULIN LISPRO 6 UNITS: 100 INJECTION, SOLUTION INTRAVENOUS; SUBCUTANEOUS at 22:50

## 2022-09-02 RX ADMIN — INSULIN LISPRO 2 UNITS: 100 INJECTION, SOLUTION INTRAVENOUS; SUBCUTANEOUS at 17:29

## 2022-09-02 RX ADMIN — LEVOFLOXACIN 750 MG: 5 INJECTION, SOLUTION INTRAVENOUS at 17:29

## 2022-09-02 RX ADMIN — MONTELUKAST 10 MG: 10 TABLET, FILM COATED ORAL at 10:18

## 2022-09-02 RX ADMIN — METHYLPREDNISOLONE SODIUM SUCCINATE 40 MG: 40 INJECTION, POWDER, FOR SOLUTION INTRAMUSCULAR; INTRAVENOUS at 06:14

## 2022-09-02 RX ADMIN — ASPIRIN 81 MG: 81 TABLET, COATED ORAL at 10:18

## 2022-09-02 RX ADMIN — POTASSIUM CHLORIDE 10 MEQ: 750 TABLET, FILM COATED, EXTENDED RELEASE ORAL at 10:18

## 2022-09-02 RX ADMIN — SODIUM CHLORIDE, PRESERVATIVE FREE 10 ML: 5 INJECTION INTRAVENOUS at 06:14

## 2022-09-02 RX ADMIN — DULOXETINE HYDROCHLORIDE 30 MG: 30 CAPSULE, DELAYED RELEASE ORAL at 04:32

## 2022-09-02 RX ADMIN — POLYETHYLENE GLYCOL 3350 17 G: 17 POWDER, FOR SOLUTION ORAL at 10:37

## 2022-09-02 RX ADMIN — FUROSEMIDE 40 MG: 10 INJECTION, SOLUTION INTRAMUSCULAR; INTRAVENOUS at 10:18

## 2022-09-02 RX ADMIN — SODIUM CHLORIDE, PRESERVATIVE FREE 10 ML: 5 INJECTION INTRAVENOUS at 22:51

## 2022-09-02 RX ADMIN — RIVAROXABAN 10 MG: 10 TABLET, FILM COATED ORAL at 10:18

## 2022-09-02 RX ADMIN — ACETYLCYSTEINE 400 MG: 200 SOLUTION ORAL; RESPIRATORY (INHALATION) at 07:31

## 2022-09-02 RX ADMIN — PANTOPRAZOLE SODIUM 40 MG: 40 TABLET, DELAYED RELEASE ORAL at 10:18

## 2022-09-02 RX ADMIN — ATORVASTATIN CALCIUM 40 MG: 40 TABLET, FILM COATED ORAL at 10:18

## 2022-09-02 RX ADMIN — ACETYLCYSTEINE 400 MG: 200 SOLUTION ORAL; RESPIRATORY (INHALATION) at 13:36

## 2022-09-02 RX ADMIN — IPRATROPIUM BROMIDE AND ALBUTEROL SULFATE 3 ML: 2.5; .5 SOLUTION RESPIRATORY (INHALATION) at 13:35

## 2022-09-02 RX ADMIN — TRAZODONE HYDROCHLORIDE 50 MG: 50 TABLET ORAL at 22:51

## 2022-09-02 RX ADMIN — METHYLPREDNISOLONE SODIUM SUCCINATE 40 MG: 40 INJECTION, POWDER, FOR SOLUTION INTRAMUSCULAR; INTRAVENOUS at 04:32

## 2022-09-02 RX ADMIN — PRAMIPEXOLE DIHYDROCHLORIDE 0.5 MG: 0.12 TABLET ORAL at 22:50

## 2022-09-02 RX ADMIN — METHYLPREDNISOLONE SODIUM SUCCINATE 40 MG: 40 INJECTION, POWDER, FOR SOLUTION INTRAMUSCULAR; INTRAVENOUS at 13:14

## 2022-09-02 RX ADMIN — GUAIFENESIN 600 MG: 600 TABLET, EXTENDED RELEASE ORAL at 22:51

## 2022-09-02 RX ADMIN — FUROSEMIDE 40 MG: 10 INJECTION, SOLUTION INTRAMUSCULAR; INTRAVENOUS at 22:58

## 2022-09-02 RX ADMIN — GUAIFENESIN 600 MG: 600 TABLET, EXTENDED RELEASE ORAL at 10:18

## 2022-09-02 RX ADMIN — SODIUM CHLORIDE, PRESERVATIVE FREE 10 ML: 5 INJECTION INTRAVENOUS at 13:14

## 2022-09-02 RX ADMIN — IPRATROPIUM BROMIDE AND ALBUTEROL SULFATE 3 ML: 2.5; .5 SOLUTION RESPIRATORY (INHALATION) at 07:31

## 2022-09-02 RX ADMIN — INSULIN LISPRO 4 UNITS: 100 INJECTION, SOLUTION INTRAVENOUS; SUBCUTANEOUS at 13:14

## 2022-09-02 RX ADMIN — CETIRIZINE HYDROCHLORIDE 10 MG: 10 TABLET, FILM COATED ORAL at 10:18

## 2022-09-02 RX ADMIN — DULOXETINE HYDROCHLORIDE 20 MG: 20 CAPSULE, DELAYED RELEASE ORAL at 10:18

## 2022-09-02 RX ADMIN — ACETYLCYSTEINE 400 MG: 200 SOLUTION ORAL; RESPIRATORY (INHALATION) at 19:53

## 2022-09-02 RX ADMIN — IPRATROPIUM BROMIDE AND ALBUTEROL SULFATE 3 ML: 2.5; .5 SOLUTION RESPIRATORY (INHALATION) at 19:52

## 2022-09-02 RX ADMIN — INSULIN LISPRO 4 UNITS: 100 INJECTION, SOLUTION INTRAVENOUS; SUBCUTANEOUS at 10:33

## 2022-09-02 NOTE — PROGRESS NOTES
PULMONARY CONSULT  [facility]    Name: Mariya Goldman MRN: [id number]   : 1955 Hospital: [facility]   Date: 2022  Admission date: 2022 Hospital Day: 3       HPI:     Hospital Problems  Date Reviewed: 8/3/2022            Codes Class Noted POA    COPD with acute exacerbation [facility]) ICD-10-CM: J44.1  ICD-9-CM: 491.21  2022 Unknown            [x] High complexity decision making was performed  [x] See my orders for details      Subjective/Initial History:     I was asked by Darío Gerber MD to see Mariya Goldman  a 61 y.o.  female in consultation     Excerpts from admission 2022 or consult notes as follows:   51-year-old lady came in with other shortness of breath and dyspnea also having chest tightness cough she was complaining of dyspnea for the past week went to [facility] received antibiotic did not seem to help she is chronically on home oxygen follows with me regarding her oxygen and COPD she came to the hospital got admitted she had a history of PE in the past on Xarelto.   So admitted and pulmonary consult was called      No Known Allergies     MAR reviewed and pertinent medications noted or modified as needed     Current Facility-Administered Medications   Medication    acetylcysteine (MUCOMYST) 200 mg/mL (20 %) solution 400 mg    guaiFENesin ER (MUCINEX) tablet 600 mg    aspirin delayed-release tablet 81 mg    atorvastatin (LIPITOR) tablet 40 mg    DULoxetine (CYMBALTA) capsule 20 mg    cetirizine (ZYRTEC) tablet 10 mg    montelukast (SINGULAIR) tablet 10 mg    pantoprazole (PROTONIX) tablet 40 mg    potassium chloride SR (KLOR-CON 10) tablet 10 mEq    pramipexole (MIRAPEX) tablet 0.5 mg    tiotropium bromide (SPIRIVA RESPIMAT) 2.5 mcg /actuation    rivaroxaban (XARELTO) tablet 10 mg    traZODone (DESYREL) tablet 50 mg    albuterol-ipratropium (DUO-NEB) 2.5 MG-0.5 MG/3 ML    levoFLOXacin (LEVAQUIN) 750 mg in D5W IVPB    insulin lispro (HUMALOG) injection    glucose chewable tablet 16 g    glucagon (GLUCAGEN) injection 1 mg    dextrose 10% infusion 0-250 mL    sodium chloride (NS) flush 5-40 mL    sodium chloride (NS) flush 5-40 mL    acetaminophen (TYLENOL) tablet 650 mg    Or    acetaminophen (TYLENOL) suppository 650 mg    polyethylene glycol (MIRALAX) packet 17 g    ondansetron (ZOFRAN ODT) tablet 4 mg    Or    ondansetron (ZOFRAN) injection 4 mg    furosemide (LASIX) injection 40 mg    methylPREDNISolone (PF) (SOLU-MEDROL) injection 40 mg      Patient PCP: Marion Castellanos DO  PMH:  has a past medical history of Breast cancer ([facility] [address] (2015), Chronic obstructive pulmonary disease ([facility] [address] and Stroke (cerebrum) ([facility] [address] (2015). PSH:   has a past surgical history that includes hx breast lumpectomy (Right, 2015) and hx heart catheterization. FHX: family history includes Breast Cancer in her sister. SHX:  reports that she quit smoking about 6 years ago. Her smoking use included cigarettes. She has a 30.00 pack-year smoking history. She has never used smokeless tobacco. She reports that she does not currently use alcohol. She reports that she does not use drugs. ROS:    Review of Systems   Respiratory:  Positive for shortness of breath and wheezing. All other systems reviewed and are negative.      Objective:     Vital Signs: Telemetry:    normal sinus rhythm Intake/Output:   Visit Vitals  BP (!) 132/56   Pulse 82 Comment: per tele   Temp 97 °F (36.1 °C)   Resp 18   Ht 5' 8\" (1.727 m)   Wt 107 kg (236 lb)   SpO2 94%   Breastfeeding No   BMI 35.88 kg/m²       Temp (24hrs), Av.6 °F (36.4 °C), Min:97 °F (36.1 °C), Max:98 °F (36.7 °C)        O2 Device: Nasal cannula O2 Flow Rate (L/min): 3 l/min       Wt Readings from Last 4 Encounters:   22 107 kg (236 lb)   22 109.3 kg (241 lb)   22 113.4 kg (250 lb)   08/01/22 113.4 kg (250 lb)          Intake/Output Summary (Last 24 hours) at 2022 8241  Last data filed at 9/2/2022 0425  Gross per 24 hour   Intake 150 ml   Output --   Net 150 ml       Last shift:      No intake/output data recorded. Last 3 shifts: 08/31 1901 - 09/02 0700  In: 150 [I.V.:150]  Out: -        Physical Exam:     Physical Exam  Constitutional:       Appearance: She is obese. HENT:      Head: Normocephalic. Right Ear: Tympanic membrane, ear canal and external ear normal.      Left Ear: Tympanic membrane, ear canal and external ear normal.      Nose: Nose normal.      Mouth/Throat:      Mouth: Mucous membranes are moist.      Pharynx: Oropharynx is clear. Eyes:      Extraocular Movements: Extraocular movements intact. Conjunctiva/sclera: Conjunctivae normal.      Pupils: Pupils are equal, round, and reactive to light. Cardiovascular:      Rate and Rhythm: Normal rate and regular rhythm. Pulses: Normal pulses. Heart sounds: Normal heart sounds. Pulmonary:      Effort: Pulmonary effort is normal.      Breath sounds: Normal breath sounds. Abdominal:      General: Abdomen is flat. Bowel sounds are normal.      Palpations: Abdomen is soft. Musculoskeletal:         General: Normal range of motion. Cervical back: Normal range of motion and neck supple. Skin:     General: Skin is warm and dry. Capillary Refill: Capillary refill takes less than 2 seconds. Neurological:      General: No focal deficit present. Mental Status: She is alert and oriented to person, place, and time. Mental status is at baseline. Psychiatric:         Mood and Affect: Mood normal.         Behavior: Behavior normal.         Thought Content:  Thought content normal.         Judgment: Judgment normal.        Labs:    Recent Labs     09/01/22  0744 08/31/22  1356   WBC 11.7* 10.8   HGB 13.3 13.8   * 145*       Recent Labs     09/01/22  0744 08/31/22  1356    138   K 4.1 3.9   CL 99 103   CO2 31 30   * 99   BUN 15 12   CREA 0.77 0.61   CA 9.3 9.5   MG 2.2  --    ALB 3.4*  -- ALT 90*  --        No results for input(s): PH, PCO2, PO2, HCO3, FIO2 in the last 72 hours. No results for input(s): CPK, CKNDX, TROIQ in the last 72 hours. No lab exists for component: CPKMB  No results found for: BNPP, BNP   Lab Results   Component Value Date/Time    Culture result: No growth 1 day 08/31/2022 04:49 PM   No results found for: TSH, TSHEXT, TSHEXT    Imaging:    CXR Results  (Last 48 hours)                 08/31/22 1405  XR CHEST SNGL V Final result    Impression:  No interval change. Narrative:  INDICATION: Cough, headache, back pain, dyspnea, chest pain and burning. Portable AP view of the chest.       Direct comparison made to prior chest x-ray dated December 2021. Cardiomediastinal silhouette is stable. There are very mild bilateral increased   interstitial markings. There is no new focal airspace process. No pleural fluid   is visualized. There is no pneumothorax. Results from [place] encounter on 08/31/22    XR CHEST SNGL V    Narrative  INDICATION: Cough, headache, back pain, dyspnea, chest pain and burning. Portable AP view of the chest.    Direct comparison made to prior chest x-ray dated December 2021. Cardiomediastinal silhouette is stable. There are very mild bilateral increased  interstitial markings. There is no new focal airspace process. No pleural fluid  is visualized. There is no pneumothorax. Impression  No interval change. Results from Hospital Encounter encounter on 12/17/21    XR CHEST PA LAT    Narrative  Examination: XR CHEST PA LAT    History: COPD    Comparison: Chest radiograph 6/16/2021    FINDINGS:    2 views of the chest. Coarse interstitial lung markings diffusely could be  related to provided clinical history of COPD. No definite focal airspace  consolidation, pneumothorax, or significant pleural effusion. Cardiac silhouette  is normal in size.  Mediastinal contours and osseous structures appear unchanged  without acute abnormality evident. Impression  Diffuse coarsened interstitial lung markings appearing similar to prior. No  findings of acute cardiopulmonary abnormality. Results from [place] encounter on 06/16/21    XR CHEST PA LAT    Narrative  Chest, 2 views, 6/16/2021    History: Chronic obstructive pulmonary disease. Comparison: Including chest 10/29/2020. Findings: The cardiac silhouette is within normal limits. The lungs are  adequately expanded. No hydrostatic edema is present. Reticular opacities in  the lungs and peribronchial thickening are not significantly changed compared to  chest 10/29/2020. No focal consolidation, pleural effusions or pneumothorax is  identified. Degenerative changes are present in the thoracic spine. Impression  Reticular opacities and peribronchial thickening, not significantly  changed. No focal airspace consolidation is identified. Results from [place] encounter on 08/31/22    CTA CHEST W OR W WO CONT    Narrative  INDICATION:  Hypoxia; hx DVT/PE    EXAM:  CTA Chest with contrast for Pulmonary Embolus    COMPARISON:  10/29/2020    TECHNIQUE:  Following the uneventful intravenous administration of Iodinated  contrast, thin helical axial images were obtained through the chest. 3D image  postprocessing was performed. Coronal and sagittal reformatted images were  obtained. CT dose reduction was achieved through use of a standardized protocol  tailored for this examination and automatic exposure control for dose  modulation. FINDINGS:    No evidence for acute central pulmonary embolus. Limited evaluation of the  segmental pulmonary arteries bilaterally due to respiratory motion artifact. No evidence for thoracic aortic dissection. No pleural or pericardial effusion. Mildly enlarged left paratracheal lymph node appears larger measuring 14 mm  (image 27), compared to 8 mm previously.     Scattered bilateral subsegmental atelectasis. No other significant pulmonary  abnormality. The central airways are patent    Nonspecific right breast subareolar low-density focus with calcification  measuring 17 mm. Mild to moderate degenerative changes in the spine    Impression  1. No evidence for acute central pulmonary embolus. Limited evaluation of the  segmental pulmonary arteries bilaterally due to respiratory motion artifact. 2. Bilateral subsegmental atelectasis without evidence for any other significant  pulmonary abnormality. 3. Slight increase in size of mildly enlarged left paratracheal superior  mediastinal lymph node. 4. Stable nonspecific right breast subareolar low-density lesion with  calcification. 5. Please refer to above findings for complete details. IMPRESSION:     Chronic Obstructive Pulmonary Disease with Severe Acute Exacerbation requiring inpatient hospitalization and management; has very poor airway clearance. Increased work of breathing  Body mass index is 35.88 kg/m². Additional workup outlined below  Acute on chronic hypoxic respiratory failure  History of breast cancer depression anxiety      RECOMMENDATIONS/PLAN:     IV Solu-Medrol nebulizer treatment CTA chest was negative for pulmonary embolism but shows atelectasis we will start patient on nebulizer treatment along with Mucomyst patient need pulmonary toilet agree with antibiotics  Supplemental O2 to keep sats > 93%  Continue with albuterol-ipratropium. 9/1 Patient is a well-appearing 60 yo female that presented to the ED yesterday for shortness of breath and chest pain. Today she reports doing much better and is able to breathe well after being given albuterol-ipratropium. She was able to sleep soundly through the night. CT from yesterday reveals no evidence of acute PE. She does have bilateral atelectasis. Mild neutrophilia as expected after being given methylprednisolone. 9/2 Patient is well-appearing and in no acute distress. She would like to know whether she can be discharged to go home with methylprednisolone to be taken via nebulizer.       Darlin Reed

## 2022-09-02 NOTE — PROGRESS NOTES
Bedside shift change report given to Leopold Gemma, RN (oncoming nurse) by Kasia Rodriguez RN (offgoing nurse). Report included the following information SBAR, Kardex, ED Summary, Intake/Output, MAR, and Cardiac Rhythm NSR .

## 2022-09-02 NOTE — PROGRESS NOTES
Hospitalist Progress Note    Subjective:   Daily Progress Note: 9/2/2022 6:15 PM    Admission HPI/Hx:  Judith Romero is a 61 y.o. female who presents with worsening SOB, chest tightness, cough, chills, dry cough. Patient was seen at [facility] ER 5 days ago and prescribed Amoxicillin + Zithromax and Prednisone. She has known hx of COPD and is on Home O2 3L; follows with Dr. Laura Morin. She has not received any Covid Vaccine. She states that she was tested for Covid at [facility]. Additionally, increased swelling. No known Cardiac Hx. Patient is on Xarelto for remote hx of DVT/PE; has had previous epistaxis but not recently. In ER patient required NRB due to severe hypoxia.  ----------------  Subjective: Breathing is so-so. No CP, n/v/d.     Current Facility-Administered Medications   Medication Dose Route Frequency    acetylcysteine (MUCOMYST) 200 mg/mL (20 %) solution 400 mg  400 mg Nebulization Q6HWA RT    guaiFENesin ER (MUCINEX) tablet 600 mg  600 mg Oral Q12H    aspirin delayed-release tablet 81 mg  81 mg Oral DAILY    atorvastatin (LIPITOR) tablet 40 mg  40 mg Oral DAILY    DULoxetine (CYMBALTA) capsule 20 mg  20 mg Oral DAILY    cetirizine (ZYRTEC) tablet 10 mg  10 mg Oral DAILY    montelukast (SINGULAIR) tablet 10 mg  10 mg Oral DAILY    pantoprazole (PROTONIX) tablet 40 mg  40 mg Oral DAILY    potassium chloride SR (KLOR-CON 10) tablet 10 mEq  10 mEq Oral DAILY    pramipexole (MIRAPEX) tablet 0.5 mg  0.5 mg Oral QHS    tiotropium bromide (SPIRIVA RESPIMAT) 2.5 mcg /actuation  2 Puff Inhalation DAILY    rivaroxaban (XARELTO) tablet 10 mg  10 mg Oral DAILY WITH BREAKFAST    traZODone (DESYREL) tablet 50 mg  50 mg Oral QHS    albuterol-ipratropium (DUO-NEB) 2.5 MG-0.5 MG/3 ML  3 mL Nebulization Q6H RT    levoFLOXacin (LEVAQUIN) 750 mg in D5W IVPB  750 mg IntraVENous Q24H    insulin lispro (HUMALOG) injection   SubCUTAneous AC&HS    glucose chewable tablet 16 g  4 Tablet Oral PRN    glucagon (GLUCAGEN) injection 1 mg  1 mg IntraMUSCular PRN    dextrose 10% infusion 0-250 mL  0-250 mL IntraVENous PRN    sodium chloride (NS) flush 5-40 mL  5-40 mL IntraVENous Q8H    sodium chloride (NS) flush 5-40 mL  5-40 mL IntraVENous PRN    acetaminophen (TYLENOL) tablet 650 mg  650 mg Oral Q6H PRN    Or    acetaminophen (TYLENOL) suppository 650 mg  650 mg Rectal Q6H PRN    polyethylene glycol (MIRALAX) packet 17 g  17 g Oral DAILY PRN    ondansetron (ZOFRAN ODT) tablet 4 mg  4 mg Oral Q8H PRN    Or    ondansetron (ZOFRAN) injection 4 mg  4 mg IntraVENous Q6H PRN    furosemide (LASIX) injection 40 mg  40 mg IntraVENous BID    methylPREDNISolone (PF) (SOLU-MEDROL) injection 40 mg  40 mg IntraVENous Q8H        Review of Systems    Constitutional: No - fever, chills, weight change    HEENT: No - sore throat, sinus pressure, ear pain    Respiratory:  + sob, wheeze    Cardiovascular: No - chest pain, palpitations, extremity edema    Gastrointestinal: No - n/v/d/bleeding/abd pain    Genitourinary: No - dysuria, hematuria    Neurological: No - headache, focal weakness, LOC, paresthesia    Skin: No - new rash, discoloration    M/S: No - joint pain, back pain, injury      Objective:     Visit Vitals  /66 (BP 1 Location: Right upper arm, BP Patient Position: Sitting)   Pulse 83   Temp 97.9 °F (36.6 °C)   Resp 18   Ht 5' 8\" (1.727 m)   Wt 107 kg (236 lb)   SpO2 93%   Breastfeeding No   BMI 35.88 kg/m²    O2 Flow Rate (L/min): 3 l/min O2 Device: Nasal cannula    Temp (24hrs), Av.6 °F (36.4 °C), Min:97 °F (36.1 °C), Max:98 °F (36.7 °C)      PHYSICAL EXAM    Constitutional: NAD, Awake; sitting bedside    Neck: Supple     Cardiovascular: S1S2    Respiratory:  Bilat post wheeze and coarse breath sounds     GI: Soft, NT; no rigidity; + bowel sounds    Musculoskeletal: Moving all extremities spontaneously; no overt injury; Neurological:  AxOx3; No new focal weakness;  No tremors    Psychiatric: Appropriate mood; oriented    Skin: No new rash or significant discoloration     Vascular: Palpable pulses;  No edema      Data Review    Recent Results (from the past 24 hour(s))   GLUCOSE, POC    Collection Time: 09/01/22  5:16 PM   Result Value Ref Range    Glucose (POC) 221 (H) 65 - 100 mg/dL    Performed by Yamilet Zhu, POC    Collection Time: 09/01/22  8:46 PM   Result Value Ref Range    Glucose (POC) 134 (H) 65 - 100 mg/dL    Performed by Omayra Berumen, POC    Collection Time: 09/02/22  8:09 AM   Result Value Ref Range    Glucose (POC) 226 (H) 65 - 100 mg/dL    Performed by Nyla Luciano    GLUCOSE, POC    Collection Time: 09/02/22 11:21 AM   Result Value Ref Range    Glucose (POC) 236 (H) 65 - 100 mg/dL    Performed by Armond Zepeda    ECHO ADULT COMPLETE    Collection Time: 09/02/22  3:54 PM   Result Value Ref Range    LV EDV A2C 27 mL    LV EDV A4C 54 mL    LV ESV A2C 10 mL    LV ESV A4C 16 mL    IVSd 1.0 (A) 0.6 - 0.9 cm    LVIDd 5.4 (A) 3.9 - 5.3 cm    LVIDs 3.3 cm    LVOT Diameter 2.0 cm    LVOT Mean Gradient 2 mmHg    LVOT VTI 17.4 cm    LVOT Peak Velocity 1.0 m/s    LVOT Peak Gradient 4 mmHg    LVPWd 1.2 (A) 0.6 - 0.9 cm    LV E' Lateral Velocity 7 cm/s    LV E' Septal Velocity 8 cm/s    LV Ejection Fraction A2C 62 %    LV Ejection Fraction A4C 70 %    EF BP 64 55 - 100 %    LVOT Area 3.1 cm2    LVOT SV 55.0 ml    LA Minor Lily 4.3 cm    LA Area 2C 9.3 cm2    LA Diameter 3.8 cm    AV Mean Gradient 7 mmHg    AV VTI 30.9 cm    AV Mean Velocity 1.2 m/s    AV Peak Velocity 1.8 m/s    AV Peak Gradient 13 mmHg    AV Area by VTI 1.8 cm2    AV Area by Peak Velocity 1.7 cm2    Aortic Root 2.4 cm    MR Peak Velocity 1.2 m/s    MR Peak Gradient 6 mmHg    MV A Velocity 0.90 m/s    MV E Velocity 0.58 m/s    MV Area by PHT 4.2 cm2    PV Max Velocity 1.1 m/s    PV Peak Gradient 4 mmHg    RV Basal Dimension 3.3 cm    RV Mid Dimension 3.1 cm    TAPSE 1.9 1.7 cm    TR Max Velocity 1.57 m/s    TR Peak Gradient 10 mmHg       CTA CHEST W OR W WO CONT   Final Result      1. No evidence for acute central pulmonary embolus. Limited evaluation of the   segmental pulmonary arteries bilaterally due to respiratory motion artifact. 2. Bilateral subsegmental atelectasis without evidence for any other significant   pulmonary abnormality. 3. Slight increase in size of mildly enlarged left paratracheal superior   mediastinal lymph node. 4. Stable nonspecific right breast subareolar low-density lesion with   calcification. 5. Please refer to above findings for complete details. XR CHEST SNGL V   Final Result   No interval change. Active Problems:    COPD with acute exacerbation ([address] (8/31/2022)      Assessment/Plan:     # Acute/Chronic Hypoxemic Resp Failure with hx of Chronic O2, COPD, DVT/PE on Xarelto.  Failed outpatient mgmt with Prednisone, Amoxil/Zithro     => Cont IV Solumedrol, Nebs, O2, Empiric Levaquin  => CTA Chest (given hx of PE): Negative for PE; Bilat Atelectasis  => Echo pending; Mild BNP elevation --> IV lasix  => Continue Xarelto  => Repeat labs  => Pulm/Allauddin following  => SSI; add Lantus for hyperglycemia d/t steroids     Hx of \"Stroke\", HTN, Breast CA, HLD, Depression/Anxiety      DVT Prophylaxis: On AC  Code Status: Full Code    Care Plan discussed with: Patient  ________________________________________  Casey Owens MD

## 2022-09-02 NOTE — PROGRESS NOTES
PULMONARY NOTE  [facility]    Name: Troy Phelps MRN: [id number]   : 1955 Hospital: [facility]   Date: 2022  Admission date: 2022 Hospital Day: 3       HPI:     Hospital Problems  Date Reviewed: 8/3/2022            Codes Class Noted POA    COPD with acute exacerbation [facility]) ICD-10-CM: J44.1  ICD-9-CM: 491.21  2022 Unknown            [x] High complexity decision making was performed  [x] See my orders for details      Subjective/Initial History:     I was asked by Nitin Rabago MD to see Troy Phelps  a 61 y.o.  female in consultation     Excerpts from admission 2022 or consult notes as follows:   62-year-old lady came in with other shortness of breath and dyspnea also having chest tightness cough she was complaining of dyspnea for the past week went to [facility] received antibiotic did not seem to help she is chronically on home oxygen follows with me regarding her oxygen and COPD she came to the hospital got admitted she had a history of PE in the past on Xarelto.   So admitted and pulmonary consult was called      No Known Allergies     MAR reviewed and pertinent medications noted or modified as needed     Current Facility-Administered Medications   Medication    acetylcysteine (MUCOMYST) 200 mg/mL (20 %) solution 400 mg    guaiFENesin ER (MUCINEX) tablet 600 mg    aspirin delayed-release tablet 81 mg    atorvastatin (LIPITOR) tablet 40 mg    DULoxetine (CYMBALTA) capsule 20 mg    cetirizine (ZYRTEC) tablet 10 mg    montelukast (SINGULAIR) tablet 10 mg    pantoprazole (PROTONIX) tablet 40 mg    potassium chloride SR (KLOR-CON 10) tablet 10 mEq    pramipexole (MIRAPEX) tablet 0.5 mg    tiotropium bromide (SPIRIVA RESPIMAT) 2.5 mcg /actuation    rivaroxaban (XARELTO) tablet 10 mg    traZODone (DESYREL) tablet 50 mg    albuterol-ipratropium (DUO-NEB) 2.5 MG-0.5 MG/3 ML    levoFLOXacin (LEVAQUIN) 750 mg in D5W IVPB    insulin lispro (HUMALOG) injection    glucose chewable tablet 16 g    glucagon (GLUCAGEN) injection 1 mg    dextrose 10% infusion 0-250 mL    sodium chloride (NS) flush 5-40 mL    sodium chloride (NS) flush 5-40 mL    acetaminophen (TYLENOL) tablet 650 mg    Or    acetaminophen (TYLENOL) suppository 650 mg    polyethylene glycol (MIRALAX) packet 17 g    ondansetron (ZOFRAN ODT) tablet 4 mg    Or    ondansetron (ZOFRAN) injection 4 mg    furosemide (LASIX) injection 40 mg    methylPREDNISolone (PF) (SOLU-MEDROL) injection 40 mg      Patient PCP: Yanet Mg DO  PMH:  has a past medical history of Breast cancer ([facility] [address] (2015), Chronic obstructive pulmonary disease ([facility] [address] and Stroke (cerebrum) ([facility] [address] (2015). PSH:   has a past surgical history that includes hx breast lumpectomy (Right, 2015) and hx heart catheterization. FHX: family history includes Breast Cancer in her sister. SHX:  reports that she quit smoking about 6 years ago. Her smoking use included cigarettes. She has a 30.00 pack-year smoking history. She has never used smokeless tobacco. She reports that she does not currently use alcohol. She reports that she does not use drugs. ROS:    Review of Systems   Respiratory:  Positive for shortness of breath and wheezing. All other systems reviewed and are negative.      Objective:     Vital Signs: Telemetry:    normal sinus rhythm Intake/Output:   Visit Vitals  BP (!) 132/56   Pulse 82 Comment: per tele   Temp 97 °F (36.1 °C)   Resp 18   Ht 5' 8\" (1.727 m)   Wt 107 kg (236 lb)   SpO2 94%   Breastfeeding No   BMI 35.88 kg/m²       Temp (24hrs), Av.5 °F (36.4 °C), Min:97 °F (36.1 °C), Max:98 °F (36.7 °C)        O2 Device: Nasal cannula O2 Flow Rate (L/min): 3 l/min       Wt Readings from Last 4 Encounters:   22 107 kg (236 lb)   22 109.3 kg (241 lb)   22 113.4 kg (250 lb)   22 113.4 kg (250 lb)          Intake/Output Summary (Last 24 hours) at 2022 0712  Last data filed at 2022 0425  Gross per 24 hour   Intake 150 ml   Output --   Net 150 ml         Last shift:      No intake/output data recorded. Last 3 shifts: 08/31 1901 - 09/02 0700  In: 150 [I.V.:150]  Out: -        Physical Exam:     Physical Exam  Constitutional:       Appearance: She is obese. HENT:      Head: Normocephalic. Right Ear: Tympanic membrane, ear canal and external ear normal.      Left Ear: Tympanic membrane, ear canal and external ear normal.      Nose: Nose normal.      Mouth/Throat:      Mouth: Mucous membranes are moist.      Pharynx: Oropharynx is clear. Eyes:      Extraocular Movements: Extraocular movements intact. Conjunctiva/sclera: Conjunctivae normal.      Pupils: Pupils are equal, round, and reactive to light. Cardiovascular:      Rate and Rhythm: Normal rate and regular rhythm. Pulses: Normal pulses. Heart sounds: Normal heart sounds. Pulmonary:      Effort: Pulmonary effort is normal.      Breath sounds: Normal breath sounds. Abdominal:      General: Abdomen is flat. Bowel sounds are normal.      Palpations: Abdomen is soft. Musculoskeletal:         General: Normal range of motion. Cervical back: Normal range of motion and neck supple. Skin:     General: Skin is warm and dry. Capillary Refill: Capillary refill takes less than 2 seconds. Neurological:      General: No focal deficit present. Mental Status: She is alert and oriented to person, place, and time. Mental status is at baseline. Psychiatric:         Mood and Affect: Mood normal.         Behavior: Behavior normal.         Thought Content:  Thought content normal.         Judgment: Judgment normal.        Labs:    Recent Labs     09/01/22  0744 08/31/22  1356   WBC 11.7* 10.8   HGB 13.3 13.8   * 145*       Recent Labs     09/01/22  0744 08/31/22  1356    138   K 4.1 3.9   CL 99 103   CO2 31 30   * 99   BUN 15 12   CREA 0.77 0.61   CA 9.3 9.5   MG 2.2  --    ALB 3.4*  --    ALT 90*  --        No results for input(s): PH, PCO2, PO2, HCO3, FIO2 in the last 72 hours. No results for input(s): CPK, CKNDX, TROIQ in the last 72 hours. No lab exists for component: CPKMB  No results found for: BNPP, BNP   Lab Results   Component Value Date/Time    Culture result: No growth 1 day 08/31/2022 04:49 PM   No results found for: TSH, TSHEXT, TSHEXT    Imaging:    CXR Results  (Last 48 hours)                 08/31/22 1405  XR CHEST SNGL V Final result    Impression:  No interval change. Narrative:  INDICATION: Cough, headache, back pain, dyspnea, chest pain and burning. Portable AP view of the chest.       Direct comparison made to prior chest x-ray dated December 2021. Cardiomediastinal silhouette is stable. There are very mild bilateral increased   interstitial markings. There is no new focal airspace process. No pleural fluid   is visualized. There is no pneumothorax. Results from [place] encounter on 08/31/22    XR CHEST SNGL V    Narrative  INDICATION: Cough, headache, back pain, dyspnea, chest pain and burning. Portable AP view of the chest.    Direct comparison made to prior chest x-ray dated December 2021. Cardiomediastinal silhouette is stable. There are very mild bilateral increased  interstitial markings. There is no new focal airspace process. No pleural fluid  is visualized. There is no pneumothorax. Impression  No interval change. Results from Hospital Encounter encounter on 12/17/21    XR CHEST PA LAT    Narrative  Examination: XR CHEST PA LAT    History: COPD    Comparison: Chest radiograph 6/16/2021    FINDINGS:    2 views of the chest. Coarse interstitial lung markings diffusely could be  related to provided clinical history of COPD. No definite focal airspace  consolidation, pneumothorax, or significant pleural effusion. Cardiac silhouette  is normal in size.  Mediastinal contours and osseous structures appear unchanged  without acute abnormality evident. Impression  Diffuse coarsened interstitial lung markings appearing similar to prior. No  findings of acute cardiopulmonary abnormality. Results from [place] encounter on 06/16/21    XR CHEST PA LAT    Narrative  Chest, 2 views, 6/16/2021    History: Chronic obstructive pulmonary disease. Comparison: Including chest 10/29/2020. Findings: The cardiac silhouette is within normal limits. The lungs are  adequately expanded. No hydrostatic edema is present. Reticular opacities in  the lungs and peribronchial thickening are not significantly changed compared to  chest 10/29/2020. No focal consolidation, pleural effusions or pneumothorax is  identified. Degenerative changes are present in the thoracic spine. Impression  Reticular opacities and peribronchial thickening, not significantly  changed. No focal airspace consolidation is identified. Results from [place] encounter on 08/31/22    CTA CHEST W OR W WO CONT    Narrative  INDICATION:  Hypoxia; hx DVT/PE    EXAM:  CTA Chest with contrast for Pulmonary Embolus    COMPARISON:  10/29/2020    TECHNIQUE:  Following the uneventful intravenous administration of Iodinated  contrast, thin helical axial images were obtained through the chest. 3D image  postprocessing was performed. Coronal and sagittal reformatted images were  obtained. CT dose reduction was achieved through use of a standardized protocol  tailored for this examination and automatic exposure control for dose  modulation. FINDINGS:    No evidence for acute central pulmonary embolus. Limited evaluation of the  segmental pulmonary arteries bilaterally due to respiratory motion artifact. No evidence for thoracic aortic dissection. No pleural or pericardial effusion. Mildly enlarged left paratracheal lymph node appears larger measuring 14 mm  (image 27), compared to 8 mm previously.     Scattered bilateral subsegmental atelectasis. No other significant pulmonary  abnormality. The central airways are patent    Nonspecific right breast subareolar low-density focus with calcification  measuring 17 mm. Mild to moderate degenerative changes in the spine    Impression  1. No evidence for acute central pulmonary embolus. Limited evaluation of the  segmental pulmonary arteries bilaterally due to respiratory motion artifact. 2. Bilateral subsegmental atelectasis without evidence for any other significant  pulmonary abnormality. 3. Slight increase in size of mildly enlarged left paratracheal superior  mediastinal lymph node. 4. Stable nonspecific right breast subareolar low-density lesion with  calcification. 5. Please refer to above findings for complete details. IMPRESSION:     Chronic Obstructive Pulmonary Disease   Additional workup outlined below  Acute on chronic hypoxic respiratory failure  History of breast cancer depression anxiety      RECOMMENDATIONS/PLAN:     IV Solu-Medrol nebulizer treatment CTA chest was negative for pulmonary embolism but shows atelectasis  patient on nebulizer treatment along with Mucomyst patient need pulmonary toilet agree with antibiotics, will decrease Solu-Medrol  Supplemental O2 to keep sats > 93%  Continue with albuterol-ipratropium. She is on Mucomyst despite of that patient is not bringing up we will start patient on Acapella and I-S     9/1 Patient is a well-appearing 60 yo female that presented to the ED yesterday for shortness of breath and chest pain. Today she reports doing much better and is able to breathe well after being given albuterol-ipratropium. She was able to sleep soundly through the night. CT from yesterday reveals no evidence of acute PE. She does have bilateral atelectasis. Mild neutrophilia as expected after being given methylprednisolone. 9/2 Patient is well-appearing and in no acute distress.  She would like to know whether she can be discharged to go home with methylprednisolone to be taken via nebulizer.       Lotus Moise MD

## 2022-09-02 NOTE — PROGRESS NOTES
CM reviewed chart. Patient being treated for  exacerbation, on IV steroids, IV antibiotics and neb treatments. Patient baseline is on oxygen at home. Discharge plan currently is home self care.

## 2022-09-02 NOTE — PROGRESS NOTES
Problem: Chronic Obstructive Pulmonary Disease (COPD)  Goal: *Absence of hypoxia  Outcome: Progressing Towards Goal  Note: Patient on 3.5 L, baseline at home w/ oxygen is 3.  Spo2 above 90%

## 2022-09-03 LAB
ANION GAP SERPL CALC-SCNC: 6 MMOL/L (ref 5–15)
BASOPHILS # BLD: 0 K/UL (ref 0–0.1)
BASOPHILS NFR BLD: 0 % (ref 0–1)
BUN SERPL-MCNC: 26 MG/DL (ref 6–20)
BUN/CREAT SERPL: 33 (ref 12–20)
CA-I BLD-MCNC: 9.7 MG/DL (ref 8.5–10.1)
CHLORIDE SERPL-SCNC: 98 MMOL/L (ref 97–108)
CO2 SERPL-SCNC: 33 MMOL/L (ref 21–32)
CREAT SERPL-MCNC: 0.79 MG/DL (ref 0.55–1.02)
DIFFERENTIAL METHOD BLD: ABNORMAL
EOSINOPHIL # BLD: 0 K/UL (ref 0–0.4)
EOSINOPHIL NFR BLD: 0 % (ref 0–7)
ERYTHROCYTE [DISTWIDTH] IN BLOOD BY AUTOMATED COUNT: 15.5 % (ref 11.5–14.5)
GLUCOSE BLD STRIP.AUTO-MCNC: 109 MG/DL (ref 65–100)
GLUCOSE BLD STRIP.AUTO-MCNC: 132 MG/DL (ref 65–100)
GLUCOSE BLD STRIP.AUTO-MCNC: 211 MG/DL (ref 65–100)
GLUCOSE BLD STRIP.AUTO-MCNC: 227 MG/DL (ref 65–100)
GLUCOSE SERPL-MCNC: 196 MG/DL (ref 65–100)
HCT VFR BLD AUTO: 44.4 % (ref 35–47)
HGB BLD-MCNC: 14.2 G/DL (ref 11.5–16)
IMM GRANULOCYTES # BLD AUTO: 0.2 K/UL (ref 0–0.04)
IMM GRANULOCYTES NFR BLD AUTO: 1 % (ref 0–0.5)
LYMPHOCYTES # BLD: 2.1 K/UL (ref 0.8–3.5)
LYMPHOCYTES NFR BLD: 15 % (ref 12–49)
MAGNESIUM SERPL-MCNC: 2.5 MG/DL (ref 1.6–2.4)
MCH RBC QN AUTO: 29.1 PG (ref 26–34)
MCHC RBC AUTO-ENTMCNC: 32 G/DL (ref 30–36.5)
MCV RBC AUTO: 91 FL (ref 80–99)
MONOCYTES # BLD: 0.7 K/UL (ref 0–1)
MONOCYTES NFR BLD: 6 % (ref 5–13)
NEUTS SEG # BLD: 10.3 K/UL (ref 1.8–8)
NEUTS SEG NFR BLD: 78 % (ref 32–75)
NRBC # BLD: 0 K/UL (ref 0–0.01)
NRBC BLD-RTO: 0 PER 100 WBC
PERFORMED BY, TECHID: ABNORMAL
PLATELET # BLD AUTO: 169 K/UL (ref 150–400)
PMV BLD AUTO: 12.8 FL (ref 8.9–12.9)
POTASSIUM SERPL-SCNC: 4.7 MMOL/L (ref 3.5–5.1)
RBC # BLD AUTO: 4.88 M/UL (ref 3.8–5.2)
SODIUM SERPL-SCNC: 137 MMOL/L (ref 136–145)
WBC # BLD AUTO: 13.3 K/UL (ref 3.6–11)

## 2022-09-03 PROCEDURE — 94761 N-INVAS EAR/PLS OXIMETRY MLT: CPT

## 2022-09-03 PROCEDURE — 94640 AIRWAY INHALATION TREATMENT: CPT

## 2022-09-03 PROCEDURE — 36415 COLL VENOUS BLD VENIPUNCTURE: CPT

## 2022-09-03 PROCEDURE — 85025 COMPLETE CBC W/AUTO DIFF WBC: CPT

## 2022-09-03 PROCEDURE — 77010033678 HC OXYGEN DAILY

## 2022-09-03 PROCEDURE — 74011000250 HC RX REV CODE- 250: Performed by: INTERNAL MEDICINE

## 2022-09-03 PROCEDURE — 74011250636 HC RX REV CODE- 250/636: Performed by: INTERNAL MEDICINE

## 2022-09-03 PROCEDURE — 74011636637 HC RX REV CODE- 636/637: Performed by: INTERNAL MEDICINE

## 2022-09-03 PROCEDURE — 94668 MNPJ CHEST WALL SBSQ: CPT

## 2022-09-03 PROCEDURE — 80048 BASIC METABOLIC PNL TOTAL CA: CPT

## 2022-09-03 PROCEDURE — 82962 GLUCOSE BLOOD TEST: CPT

## 2022-09-03 PROCEDURE — 83735 ASSAY OF MAGNESIUM: CPT

## 2022-09-03 PROCEDURE — 65270000029 HC RM PRIVATE

## 2022-09-03 PROCEDURE — 74011250637 HC RX REV CODE- 250/637: Performed by: NURSE PRACTITIONER

## 2022-09-03 PROCEDURE — 74011250637 HC RX REV CODE- 250/637: Performed by: INTERNAL MEDICINE

## 2022-09-03 RX ORDER — DULOXETIN HYDROCHLORIDE 30 MG/1
30 CAPSULE, DELAYED RELEASE ORAL 2 TIMES DAILY
Status: DISCONTINUED | OUTPATIENT
Start: 2022-09-03 | End: 2022-09-05 | Stop reason: HOSPADM

## 2022-09-03 RX ORDER — GUAIFENESIN 600 MG/1
1200 TABLET, EXTENDED RELEASE ORAL EVERY 12 HOURS
Status: DISCONTINUED | OUTPATIENT
Start: 2022-09-03 | End: 2022-09-05 | Stop reason: HOSPADM

## 2022-09-03 RX ADMIN — PANTOPRAZOLE SODIUM 40 MG: 40 TABLET, DELAYED RELEASE ORAL at 09:22

## 2022-09-03 RX ADMIN — DULOXETINE HYDROCHLORIDE 20 MG: 20 CAPSULE, DELAYED RELEASE ORAL at 10:39

## 2022-09-03 RX ADMIN — IPRATROPIUM BROMIDE AND ALBUTEROL SULFATE 3 ML: 2.5; .5 SOLUTION RESPIRATORY (INHALATION) at 07:46

## 2022-09-03 RX ADMIN — GUAIFENESIN 1200 MG: 600 TABLET, EXTENDED RELEASE ORAL at 21:35

## 2022-09-03 RX ADMIN — RIVAROXABAN 10 MG: 10 TABLET, FILM COATED ORAL at 12:06

## 2022-09-03 RX ADMIN — ACETYLCYSTEINE 400 MG: 200 SOLUTION ORAL; RESPIRATORY (INHALATION) at 07:46

## 2022-09-03 RX ADMIN — METHYLPREDNISOLONE SODIUM SUCCINATE 40 MG: 40 INJECTION, POWDER, FOR SOLUTION INTRAMUSCULAR; INTRAVENOUS at 05:27

## 2022-09-03 RX ADMIN — FUROSEMIDE 40 MG: 10 INJECTION, SOLUTION INTRAMUSCULAR; INTRAVENOUS at 21:35

## 2022-09-03 RX ADMIN — ACETYLCYSTEINE 400 MG: 200 SOLUTION ORAL; RESPIRATORY (INHALATION) at 19:41

## 2022-09-03 RX ADMIN — SODIUM CHLORIDE, PRESERVATIVE FREE 10 ML: 5 INJECTION INTRAVENOUS at 14:13

## 2022-09-03 RX ADMIN — MONTELUKAST 10 MG: 10 TABLET, FILM COATED ORAL at 09:22

## 2022-09-03 RX ADMIN — PRAMIPEXOLE DIHYDROCHLORIDE 0.5 MG: 0.12 TABLET ORAL at 21:35

## 2022-09-03 RX ADMIN — ACETYLCYSTEINE 400 MG: 200 SOLUTION ORAL; RESPIRATORY (INHALATION) at 13:18

## 2022-09-03 RX ADMIN — FUROSEMIDE 40 MG: 10 INJECTION, SOLUTION INTRAMUSCULAR; INTRAVENOUS at 09:23

## 2022-09-03 RX ADMIN — CETIRIZINE HYDROCHLORIDE 10 MG: 10 TABLET, FILM COATED ORAL at 10:39

## 2022-09-03 RX ADMIN — ATORVASTATIN CALCIUM 40 MG: 40 TABLET, FILM COATED ORAL at 09:22

## 2022-09-03 RX ADMIN — METHYLPREDNISOLONE SODIUM SUCCINATE 40 MG: 40 INJECTION, POWDER, FOR SOLUTION INTRAMUSCULAR; INTRAVENOUS at 21:37

## 2022-09-03 RX ADMIN — ACETAMINOPHEN 650 MG: 325 TABLET, FILM COATED ORAL at 14:08

## 2022-09-03 RX ADMIN — LEVOFLOXACIN 750 MG: 500 TABLET, FILM COATED ORAL at 12:06

## 2022-09-03 RX ADMIN — INSULIN GLARGINE 10 UNITS: 100 INJECTION, SOLUTION SUBCUTANEOUS at 21:34

## 2022-09-03 RX ADMIN — INSULIN LISPRO 4 UNITS: 100 INJECTION, SOLUTION INTRAVENOUS; SUBCUTANEOUS at 09:24

## 2022-09-03 RX ADMIN — INSULIN LISPRO 4 UNITS: 100 INJECTION, SOLUTION INTRAVENOUS; SUBCUTANEOUS at 12:12

## 2022-09-03 RX ADMIN — GUAIFENESIN 600 MG: 600 TABLET, EXTENDED RELEASE ORAL at 09:22

## 2022-09-03 RX ADMIN — ACETAMINOPHEN 650 MG: 325 TABLET, FILM COATED ORAL at 19:37

## 2022-09-03 RX ADMIN — IPRATROPIUM BROMIDE AND ALBUTEROL SULFATE 3 ML: 2.5; .5 SOLUTION RESPIRATORY (INHALATION) at 19:41

## 2022-09-03 RX ADMIN — SODIUM CHLORIDE, PRESERVATIVE FREE 5 ML: 5 INJECTION INTRAVENOUS at 21:38

## 2022-09-03 RX ADMIN — IPRATROPIUM BROMIDE AND ALBUTEROL SULFATE 3 ML: 2.5; .5 SOLUTION RESPIRATORY (INHALATION) at 13:18

## 2022-09-03 RX ADMIN — SODIUM CHLORIDE, PRESERVATIVE FREE 10 ML: 5 INJECTION INTRAVENOUS at 05:27

## 2022-09-03 RX ADMIN — POLYETHYLENE GLYCOL 3350 17 G: 17 POWDER, FOR SOLUTION ORAL at 10:38

## 2022-09-03 RX ADMIN — POTASSIUM CHLORIDE 10 MEQ: 750 TABLET, FILM COATED, EXTENDED RELEASE ORAL at 09:22

## 2022-09-03 RX ADMIN — DULOXETINE HYDROCHLORIDE 30 MG: 30 CAPSULE, DELAYED RELEASE ORAL at 21:35

## 2022-09-03 RX ADMIN — ASPIRIN 81 MG: 81 TABLET, COATED ORAL at 09:22

## 2022-09-03 RX ADMIN — IPRATROPIUM BROMIDE AND ALBUTEROL SULFATE 3 ML: 2.5; .5 SOLUTION RESPIRATORY (INHALATION) at 01:00

## 2022-09-03 RX ADMIN — TRAZODONE HYDROCHLORIDE 50 MG: 50 TABLET ORAL at 21:35

## 2022-09-03 NOTE — PROGRESS NOTES
Problem: General Medical Care Plan  Goal: *Vital signs within specified parameters  Outcome: Progressing Towards Goal     Problem: General Medical Care Plan  Goal: *Absence of infection signs and symptoms  Outcome: Progressing Towards Goal 157.48

## 2022-09-03 NOTE — PROGRESS NOTES
Hospitalist Progress Note    Subjective:     Patient seen for follow up    Admission HPI/Hx:  Judith Romero is a 61 y.o. female who presents with worsening SOB, chest tightness, cough, chills, dry cough. Patient was seen at [facility] ER 5 days ago and prescribed Amoxicillin + Zithromax and Prednisone. She has known hx of COPD and is on Home O2 3L; follows with Dr. Laura Morin. She has not received any Covid Vaccine. She states that she was tested for Covid at [facility]. Additionally, increased swelling. No known Cardiac Hx. Patient is on Xarelto for remote hx of DVT/PE; has had previous epistaxis but not recently. In ER patient required NRB due to severe hypoxia.     09/03/2022  Patient seen at bedside  She tells me her breathing is improving  She is in no acute distress      Current Facility-Administered Medications   Medication Dose Route Frequency    methylPREDNISolone (PF) (SOLU-MEDROL) injection 40 mg  40 mg IntraVENous Q12H    levoFLOXacin (LEVAQUIN) tablet 750 mg  750 mg Oral Q24H    guaiFENesin ER (MUCINEX) tablet 1,200 mg  1,200 mg Oral Q12H    insulin glargine (LANTUS) injection 10 Units  10 Units SubCUTAneous QHS    acetylcysteine (MUCOMYST) 200 mg/mL (20 %) solution 400 mg  400 mg Nebulization Q6HWA RT    aspirin delayed-release tablet 81 mg  81 mg Oral DAILY    atorvastatin (LIPITOR) tablet 40 mg  40 mg Oral DAILY    DULoxetine (CYMBALTA) capsule 20 mg  20 mg Oral DAILY    cetirizine (ZYRTEC) tablet 10 mg  10 mg Oral DAILY    montelukast (SINGULAIR) tablet 10 mg  10 mg Oral DAILY    pantoprazole (PROTONIX) tablet 40 mg  40 mg Oral DAILY    potassium chloride SR (KLOR-CON 10) tablet 10 mEq  10 mEq Oral DAILY    pramipexole (MIRAPEX) tablet 0.5 mg  0.5 mg Oral QHS    tiotropium bromide (SPIRIVA RESPIMAT) 2.5 mcg /actuation  2 Puff Inhalation DAILY    rivaroxaban (XARELTO) tablet 10 mg  10 mg Oral DAILY WITH BREAKFAST    traZODone (DESYREL) tablet 50 mg  50 mg Oral QHS    albuterol-ipratropium (DUO-NEB) 2.5 MG-0.5 MG/3 ML  3 mL Nebulization Q6H RT    insulin lispro (HUMALOG) injection   SubCUTAneous AC&HS    glucose chewable tablet 16 g  4 Tablet Oral PRN    glucagon (GLUCAGEN) injection 1 mg  1 mg IntraMUSCular PRN    dextrose 10% infusion 0-250 mL  0-250 mL IntraVENous PRN    sodium chloride (NS) flush 5-40 mL  5-40 mL IntraVENous Q8H    sodium chloride (NS) flush 5-40 mL  5-40 mL IntraVENous PRN    acetaminophen (TYLENOL) tablet 650 mg  650 mg Oral Q6H PRN    Or    acetaminophen (TYLENOL) suppository 650 mg  650 mg Rectal Q6H PRN    polyethylene glycol (MIRALAX) packet 17 g  17 g Oral DAILY PRN    ondansetron (ZOFRAN ODT) tablet 4 mg  4 mg Oral Q8H PRN    Or    ondansetron (ZOFRAN) injection 4 mg  4 mg IntraVENous Q6H PRN    furosemide (LASIX) injection 40 mg  40 mg IntraVENous BID        Review of Systems    Constitutional: No - fever, chills, weight change    HEENT: No - sore throat, sinus pressure, ear pain    Respiratory:  + sob, wheeze    Cardiovascular: No - chest pain, palpitations, extremity edema    Gastrointestinal: No - n/v/d/bleeding/abd pain    Genitourinary: No - dysuria, hematuria    Neurological: No - headache, focal weakness, LOC, paresthesia    Skin: No - new rash, discoloration    M/S: No - joint pain, back pain, injury      Objective:     Visit Vitals  BP (!) 149/72 (BP 1 Location: Right upper arm, BP Patient Position: Semi fowlers)   Pulse 65   Temp 98.1 °F (36.7 °C)   Resp 19   Ht 5' 8\" (1.727 m)   Wt 107 kg (236 lb)   SpO2 93%   Breastfeeding No   BMI 35.88 kg/m²    O2 Flow Rate (L/min): 3 l/min O2 Device: Nasal cannula    Temp (24hrs), Av.9 °F (36.6 °C), Min:97.6 °F (36.4 °C), Max:98.1 °F (36.7 °C)      PHYSICAL EXAM    Constitutional: NAD, Awake; sitting bedside    Neck: Supple     Cardiovascular: S1S2    Respiratory:  Bilat post wheeze and coarse breath sounds     GI: Soft, NT; no rigidity; + bowel sounds    Musculoskeletal: Moving all extremities spontaneously; no overt injury; Neurological:  AxOx3; No new focal weakness; No tremors    Psychiatric: Appropriate mood; oriented    Skin: No new rash or significant discoloration     Vascular: Palpable pulses;  No edema      Data Review    Recent Results (from the past 24 hour(s))   ECHO ADULT COMPLETE    Collection Time: 09/02/22  3:20 PM   Result Value Ref Range    LV EDV A2C 27 mL    LV EDV A4C 54 mL    LV ESV A2C 10 mL    LV ESV A4C 16 mL    IVSd 1.0 (A) 0.6 - 0.9 cm    LVIDd 5.4 (A) 3.9 - 5.3 cm    LVIDs 3.3 cm    LVOT Diameter 2.0 cm    LVOT Mean Gradient 2 mmHg    LVOT VTI 17.4 cm    LVOT Peak Velocity 1.0 m/s    LVOT Peak Gradient 4 mmHg    LVPWd 1.2 (A) 0.6 - 0.9 cm    LV E' Lateral Velocity 7 cm/s    LV E' Septal Velocity 8 cm/s    LV Ejection Fraction A2C 62 %    LV Ejection Fraction A4C 70 %    EF BP 64 55 - 100 %    LVOT Area 3.1 cm2    LVOT SV 54.6 ml    LA Minor [place] 4.3 cm    LA Area 2C 9.3 cm2    LA Diameter 3.8 cm    AV Mean Gradient 7 mmHg    AV VTI 30.9 cm    AV Mean Velocity 1.2 m/s    AV Peak Velocity 1.8 m/s    AV Peak Gradient 13 mmHg    AV Area by VTI 1.8 cm2    AV Area by Peak Velocity 1.7 cm2    Aortic Root 2.4 cm    MR Peak Velocity 1.2 m/s    MR Peak Gradient 6 mmHg    MV A Velocity 0.90 m/s    MV E Velocity 0.58 m/s    PV Max Velocity 1.1 m/s    PV Peak Gradient 4 mmHg    RV Basal Dimension 3.3 cm    RV Mid Dimension 3.1 cm    TAPSE 1.9 1.7 cm    TR Max Velocity 1.57 m/s    TR Peak Gradient 10 mmHg    Fractional Shortening 2D 39 28 - 44 %    LV ESV Index A4C 7 mL/m2    LV EDV Index A4C 25 mL/m2    LV ESV Index A2C 5 mL/m2    LV EDV Index A2C 12 mL/m2    LVIDd Index 2.47 cm/m2    LVIDs Index 1.51 cm/m2    LV RWT Ratio 0.44     LV Mass 2D 234.8 (A) 67 - 162 g    LV Mass 2D Index 107.2 (A) 43 - 95 g/m2    MV E/A 0.64     E/E' Ratio (Averaged) 7.77     E/E' Lateral 8.29     E/E' Septal 7.25     LVOT Stroke Volume Index 24.9 mL/m2    LA Size Index 1.74 cm/m2    LA/AO Root Ratio 1.58     Ao Root Index 1.10 cm/m2    AV Velocity Ratio 0.56     LVOT:AV VTI Index 0.56     JAYA/BSA VTI 0.8 cm2/m2    JAYA/BSA Peak Velocity 0.8 cm2/m2   GLUCOSE, POC    Collection Time: 09/02/22  5:22 PM   Result Value Ref Range    Glucose (POC) 190 (H) 65 - 100 mg/dL    Performed by [employer]oria,[address], BASIC    Collection Time: 09/02/22  5:53 PM   Result Value Ref Range    Sodium 136 136 - 145 mmol/L    Potassium 4.6 3.5 - 5.1 mmol/L    Chloride 98 97 - 108 mmol/L    CO2 32 21 - 32 mmol/L    Anion gap 6 5 - 15 mmol/L    Glucose 204 (H) 65 - 100 mg/dL    BUN 27 (H) 6 - 20 mg/dL    Creatinine 0.85 0.55 - 1.02 mg/dL    BUN/Creatinine ratio 32 (H) 12 - 20      GFR est AA >60 >60 ml/min/1.73m2    GFR est non-AA >60 >60 ml/min/1.73m2    Calcium 10.0 8.5 - 10.1 mg/dL   GLUCOSE, POC    Collection Time: 09/02/22  8:26 PM   Result Value Ref Range    Glucose (POC) 296 (H) 65 - 100 mg/dL    Performed by Alba Garcia    GLUCOSE, POC    Collection Time: 09/03/22  7:45 AM   Result Value Ref Range    Glucose (POC) 227 (H) 65 - 100 mg/dL    Performed by Charlie Jacobsen    CBC WITH AUTOMATED DIFF    Collection Time: 09/03/22  8:30 AM   Result Value Ref Range    WBC 13.3 (H) 3.6 - 11.0 K/uL    RBC 4.88 3.80 - 5.20 M/uL    HGB 14.2 11.5 - 16.0 g/dL    HCT 44.4 35.0 - 47.0 %    MCV 91.0 80.0 - 99.0 FL    MCH 29.1 26.0 - 34.0 PG    MCHC 32.0 30.0 - 36.5 g/dL    RDW 15.5 (H) 11.5 - 14.5 %    PLATELET 554 147 - 050 K/uL    MPV 12.8 8.9 - 12.9 FL    NRBC 0.0 0.0  WBC    ABSOLUTE NRBC 0.00 0.00 - 0.01 K/uL    NEUTROPHILS 78 (H) 32 - 75 %    LYMPHOCYTES 15 12 - 49 %    MONOCYTES 6 5 - 13 %    EOSINOPHILS 0 0 - 7 %    BASOPHILS 0 0 - 1 %    IMMATURE GRANULOCYTES 1 (H) 0 - 0.5 %    ABS. NEUTROPHILS 10.3 (H) 1.8 - 8.0 K/UL    ABS. LYMPHOCYTES 2.1 0.8 - 3.5 K/UL    ABS. MONOCYTES 0.7 0.0 - 1.0 K/UL    ABS. EOSINOPHILS 0.0 0.0 - 0.4 K/UL    ABS. BASOPHILS 0.0 0.0 - 0.1 K/UL    ABS. IMM.  GRANS. 0.2 (H) 0.00 - 0.04 K/UL    DF AUTOMATED     MAGNESIUM Collection Time: 09/03/22  8:30 AM   Result Value Ref Range    Magnesium 2.5 (H) 1.6 - 2.4 mg/dL   METABOLIC PANEL, BASIC    Collection Time: 09/03/22  8:30 AM   Result Value Ref Range    Sodium 137 136 - 145 mmol/L    Potassium 4.7 3.5 - 5.1 mmol/L    Chloride 98 97 - 108 mmol/L    CO2 33 (H) 21 - 32 mmol/L    Anion gap 6 5 - 15 mmol/L    Glucose 196 (H) 65 - 100 mg/dL    BUN 26 (H) 6 - 20 mg/dL    Creatinine 0.79 0.55 - 1.02 mg/dL    BUN/Creatinine ratio 33 (H) 12 - 20      GFR est AA >60 >60 ml/min/1.73m2    GFR est non-AA >60 >60 ml/min/1.73m2    Calcium 9.7 8.5 - 10.1 mg/dL   GLUCOSE, POC    Collection Time: 09/03/22 12:06 PM   Result Value Ref Range    Glucose (POC) 211 (H) 65 - 100 mg/dL    Performed by Tysno Lara        CTA CHEST W OR W WO CONT   Final Result      1. No evidence for acute central pulmonary embolus. Limited evaluation of the   segmental pulmonary arteries bilaterally due to respiratory motion artifact. 2. Bilateral subsegmental atelectasis without evidence for any other significant   pulmonary abnormality. 3. Slight increase in size of mildly enlarged left paratracheal superior   mediastinal lymph node. 4. Stable nonspecific right breast subareolar low-density lesion with   calcification. 5. Please refer to above findings for complete details. XR CHEST SNGL V   Final Result   No interval change. Active Problems:    COPD with acute exacerbation ([facility] [address] (8/31/2022)      Assessment/Plan:     # Acute/Chronic Hypoxemic Resp Failure with hx of Chronic O2, COPD, DVT/PE on Xarelto.  Failed outpatient mgmt with Prednisone, Amoxil/Zithro     => Cont IV Solumedrol, Nebs, O2, Empiric Levaquin  => CTA Chest (given hx of PE): Negative for PE; Bilat Atelectasis  => Echo pending; Mild BNP elevation --> IV lasix  => Continue Xarelto  => Pulm/Allauddin following  => SSI; add Lantus for hyperglycemia d/t steroids      DVT Prophylaxis: On AC  Code Status: Full Code    ________________________________________  Liza Allan, NP

## 2022-09-03 NOTE — PROGRESS NOTES
PULMONARY NOTE  [facility]    Name: Kristen Cancino MRN: [id number]   : 1955 Hospital: [facility]   Date: 9/3/2022  Admission date: 2022 Hospital Day: 4       HPI:     Hospital Problems  Date Reviewed: 8/3/2022            Codes Class Noted POA    COPD with acute exacerbation [facility]) ICD-10-CM: J44.1  ICD-9-CM: 491.21  2022 Unknown          [x] High complexity decision making was performed  [x] See my orders for details      Subjective/Initial History:     I was asked by Kaitlin Partida MD to see Kristen Cancino  a 61 y.o.  female in consultation     Excerpts from admission 2022 or consult notes as follows:   53-year-old lady came in with other shortness of breath and dyspnea also having chest tightness cough she was complaining of dyspnea for the past week went to [facility] received antibiotic did not seem to help she is chronically on home oxygen follows with me regarding her oxygen and COPD she came to the hospital got admitted she had a history of PE in the past on Xarelto.   So admitted and pulmonary consult was called      No Known Allergies     MAR reviewed and pertinent medications noted or modified as needed     Current Facility-Administered Medications   Medication    insulin glargine (LANTUS) injection 10 Units    acetylcysteine (MUCOMYST) 200 mg/mL (20 %) solution 400 mg    guaiFENesin ER (MUCINEX) tablet 600 mg    aspirin delayed-release tablet 81 mg    atorvastatin (LIPITOR) tablet 40 mg    DULoxetine (CYMBALTA) capsule 20 mg    cetirizine (ZYRTEC) tablet 10 mg    montelukast (SINGULAIR) tablet 10 mg    pantoprazole (PROTONIX) tablet 40 mg    potassium chloride SR (KLOR-CON 10) tablet 10 mEq    pramipexole (MIRAPEX) tablet 0.5 mg    tiotropium bromide (SPIRIVA RESPIMAT) 2.5 mcg /actuation    rivaroxaban (XARELTO) tablet 10 mg    traZODone (DESYREL) tablet 50 mg    albuterol-ipratropium (DUO-NEB) 2.5 MG-0.5 MG/3 ML    levoFLOXacin (LEVAQUIN) 750 mg in D5W IVPB    insulin lispro (HUMALOG) injection    glucose chewable tablet 16 g    glucagon (GLUCAGEN) injection 1 mg    dextrose 10% infusion 0-250 mL    sodium chloride (NS) flush 5-40 mL    sodium chloride (NS) flush 5-40 mL    acetaminophen (TYLENOL) tablet 650 mg    Or    acetaminophen (TYLENOL) suppository 650 mg    polyethylene glycol (MIRALAX) packet 17 g    ondansetron (ZOFRAN ODT) tablet 4 mg    Or    ondansetron (ZOFRAN) injection 4 mg    furosemide (LASIX) injection 40 mg    methylPREDNISolone (PF) (SOLU-MEDROL) injection 40 mg      Patient PCP: William Mckeon DO  PMH:  has a past medical history of Breast cancer ([facility] [address] (), Chronic obstructive pulmonary disease ([facility] [address] and Stroke (cerebrum) ([facility] [address] (2015). PSH:   has a past surgical history that includes hx breast lumpectomy (Right, ) and hx heart catheterization. FHX: family history includes Breast Cancer in her sister. SHX:  reports that she quit smoking about 6 years ago. Her smoking use included cigarettes. She has a 30.00 pack-year smoking history. She has never used smokeless tobacco. She reports that she does not currently use alcohol. She reports that she does not use drugs. ROS:    Review of Systems   Respiratory:  Positive for shortness of breath and wheezing. All other systems reviewed and are negative.      Objective:     Vital Signs: Telemetry:    normal sinus rhythm Intake/Output:   Visit Vitals  BP (!) 149/72 (BP 1 Location: Right upper arm, BP Patient Position: Semi fowlers)   Pulse 65   Temp 98.1 °F (36.7 °C)   Resp 19   Ht 5' 8\" (1.727 m)   Wt 107 kg (236 lb)   SpO2 93%   Breastfeeding No   BMI 35.88 kg/m²       Temp (24hrs), Av.9 °F (36.6 °C), Min:97.6 °F (36.4 °C), Max:98.1 °F (36.7 °C)        O2 Device: Nasal cannula O2 Flow Rate (L/min): 3 l/min       Wt Readings from Last 4 Encounters:   22 107 kg (236 lb)   22 109.3 kg (241 lb)   22 113.4 kg (250 lb)   22 113.4 kg (250 lb)        No intake or output data in the 24 hours ending 09/03/22 0923      Last shift:      No intake/output data recorded. Last 3 shifts: 09/01 1901 - 09/03 0700  In: 150 [I.V.:150]  Out: -        Physical Exam:     Physical Exam  Constitutional:       Appearance: She is obese. HENT:      Head: Normocephalic. Right Ear: Tympanic membrane, ear canal and external ear normal.      Left Ear: Tympanic membrane, ear canal and external ear normal.      Nose: Nose normal.      Mouth/Throat:      Mouth: Mucous membranes are moist.      Pharynx: Oropharynx is clear. Eyes:      Extraocular Movements: Extraocular movements intact. Conjunctiva/sclera: Conjunctivae normal.      Pupils: Pupils are equal, round, and reactive to light. Cardiovascular:      Rate and Rhythm: Normal rate and regular rhythm. Pulses: Normal pulses. Heart sounds: Normal heart sounds. Pulmonary:      Effort: Pulmonary effort is normal.      Breath sounds: Normal breath sounds. Abdominal:      General: Abdomen is flat. Bowel sounds are normal.      Palpations: Abdomen is soft. Musculoskeletal:         General: Normal range of motion. Cervical back: Normal range of motion and neck supple. Skin:     General: Skin is warm and dry. Capillary Refill: Capillary refill takes less than 2 seconds. Neurological:      General: No focal deficit present. Mental Status: She is alert and oriented to person, place, and time. Mental status is at baseline. Psychiatric:         Mood and Affect: Mood normal.         Behavior: Behavior normal.         Thought Content:  Thought content normal.         Judgment: Judgment normal.        Labs:    Recent Labs     09/01/22  0744 08/31/22  1356   WBC 11.7* 10.8   HGB 13.3 13.8   * 145*       Recent Labs     09/02/22  1753 09/01/22  0744 08/31/22  1356    137 138   K 4.6 4.1 3.9   CL 98 99 103   CO2 32 31 30   * 169* 99   BUN 27* 15 12   CREA 0.85 0.77 0.61   CA 10.0 9.3 9.5   MG  --  2.2  --    ALB  --  3.4*  --    ALT  --  90*  --        No results for input(s): PH, PCO2, PO2, HCO3, FIO2 in the last 72 hours. No results for input(s): CPK, CKNDX, TROIQ in the last 72 hours. No lab exists for component: CPKMB  No results found for: BNPP, BNP   Lab Results   Component Value Date/Time    Culture result: No growth 2 days 08/31/2022 04:49 PM   No results found for: TSH, TSHEXT, TSHEXT    Imaging:    CXR Results  (Last 48 hours)      None          Results from Hospital Encounter encounter on 08/31/22    XR CHEST SNGL V    Narrative  INDICATION: Cough, headache, back pain, dyspnea, chest pain and burning. Portable AP view of the chest.    Direct comparison made to prior chest x-ray dated December 2021. Cardiomediastinal silhouette is stable. There are very mild bilateral increased  interstitial markings. There is no new focal airspace process. No pleural fluid  is visualized. There is no pneumothorax. Impression  No interval change. Results from Hospital Encounter encounter on 12/17/21    XR CHEST PA LAT    Narrative  Examination: XR CHEST PA LAT    History: COPD    Comparison: Chest radiograph 6/16/2021    FINDINGS:    2 views of the chest. Coarse interstitial lung markings diffusely could be  related to provided clinical history of COPD. No definite focal airspace  consolidation, pneumothorax, or significant pleural effusion. Cardiac silhouette  is normal in size. Mediastinal contours and osseous structures appear unchanged  without acute abnormality evident. Impression  Diffuse coarsened interstitial lung markings appearing similar to prior. No  findings of acute cardiopulmonary abnormality. Results from [place] encounter on 06/16/21    XR CHEST PA LAT    Narrative  Chest, 2 views, 6/16/2021    History: Chronic obstructive pulmonary disease. Comparison: Including chest 10/29/2020. Findings:  The cardiac silhouette is within normal limits. The lungs are  adequately expanded. No hydrostatic edema is present. Reticular opacities in  the lungs and peribronchial thickening are not significantly changed compared to  chest 10/29/2020. No focal consolidation, pleural effusions or pneumothorax is  identified. Degenerative changes are present in the thoracic spine. Impression  Reticular opacities and peribronchial thickening, not significantly  changed. No focal airspace consolidation is identified. Results from [place] encounter on 08/31/22    CTA CHEST W OR W WO CONT    Narrative  INDICATION:  Hypoxia; hx DVT/PE    EXAM:  CTA Chest with contrast for Pulmonary Embolus    COMPARISON:  10/29/2020    TECHNIQUE:  Following the uneventful intravenous administration of Iodinated  contrast, thin helical axial images were obtained through the chest. 3D image  postprocessing was performed. Coronal and sagittal reformatted images were  obtained. CT dose reduction was achieved through use of a standardized protocol  tailored for this examination and automatic exposure control for dose  modulation. FINDINGS:    No evidence for acute central pulmonary embolus. Limited evaluation of the  segmental pulmonary arteries bilaterally due to respiratory motion artifact. No evidence for thoracic aortic dissection. No pleural or pericardial effusion. Mildly enlarged left paratracheal lymph node appears larger measuring 14 mm  (image 27), compared to 8 mm previously. Scattered bilateral subsegmental atelectasis. No other significant pulmonary  abnormality. The central airways are patent    Nonspecific right breast subareolar low-density focus with calcification  measuring 17 mm. Mild to moderate degenerative changes in the spine    Impression  1. No evidence for acute central pulmonary embolus. Limited evaluation of the  segmental pulmonary arteries bilaterally due to respiratory motion artifact.   2. Bilateral subsegmental atelectasis without evidence for any other significant  pulmonary abnormality. 3. Slight increase in size of mildly enlarged left paratracheal superior  mediastinal lymph node. 4. Stable nonspecific right breast subareolar low-density lesion with  calcification. 5. Please refer to above findings for complete details. IMPRESSION:     Chronic Obstructive Pulmonary Disease   Additional workup outlined below  Acute on chronic hypoxic respiratory failure  History of breast cancer depression anxiety      RECOMMENDATIONS/PLAN:     IV Solu-Medrol nebulizer treatment CTA chest was negative for pulmonary embolism but shows atelectasis  patient on nebulizer treatment along with Mucomyst patient need pulmonary toilet agree with antibiotics, will decrease Solu-Medrol  Supplemental O2 to keep sats > 93%  Continue with albuterol-ipratropium. She is on Mucomyst despite of that patient is not bringing up we will start patient on Acapella and I-S     9/1 Patient is a well-appearing 62 yo female that presented to the ED yesterday for shortness of breath and chest pain. Today she reports doing much better and is able to breathe well after being given albuterol-ipratropium. She was able to sleep soundly through the night. CT from yesterday reveals no evidence of acute PE. She does have bilateral atelectasis. Mild neutrophilia as expected after being given methylprednisolone. 9/2 Patient is well-appearing and in no acute distress.  She would like to know whether she can be discharged to go home   9/3 still unable to bring sputum minimal cough continue with antibiotic pulmonary toilet we will increase Mucinex to 1200, she is not on oxygen at home we will continue to wean oxygen per protocol      Vahid Osorio MD

## 2022-09-04 LAB
ANION GAP SERPL CALC-SCNC: 3 MMOL/L (ref 5–15)
BUN SERPL-MCNC: 28 MG/DL (ref 6–20)
BUN/CREAT SERPL: 34 (ref 12–20)
CA-I BLD-MCNC: 9.6 MG/DL (ref 8.5–10.1)
CHLORIDE SERPL-SCNC: 98 MMOL/L (ref 97–108)
CO2 SERPL-SCNC: 35 MMOL/L (ref 21–32)
CREAT SERPL-MCNC: 0.83 MG/DL (ref 0.55–1.02)
GLUCOSE BLD STRIP.AUTO-MCNC: 138 MG/DL (ref 65–100)
GLUCOSE BLD STRIP.AUTO-MCNC: 183 MG/DL (ref 65–100)
GLUCOSE BLD STRIP.AUTO-MCNC: 187 MG/DL (ref 65–100)
GLUCOSE BLD STRIP.AUTO-MCNC: 256 MG/DL (ref 65–100)
GLUCOSE SERPL-MCNC: 159 MG/DL (ref 65–100)
PERFORMED BY, TECHID: ABNORMAL
POTASSIUM SERPL-SCNC: 4.7 MMOL/L (ref 3.5–5.1)
SODIUM SERPL-SCNC: 136 MMOL/L (ref 136–145)

## 2022-09-04 PROCEDURE — 74011250637 HC RX REV CODE- 250/637: Performed by: INTERNAL MEDICINE

## 2022-09-04 PROCEDURE — 94761 N-INVAS EAR/PLS OXIMETRY MLT: CPT

## 2022-09-04 PROCEDURE — 36415 COLL VENOUS BLD VENIPUNCTURE: CPT

## 2022-09-04 PROCEDURE — 74011250636 HC RX REV CODE- 250/636: Performed by: INTERNAL MEDICINE

## 2022-09-04 PROCEDURE — 77010033678 HC OXYGEN DAILY

## 2022-09-04 PROCEDURE — 80048 BASIC METABOLIC PNL TOTAL CA: CPT

## 2022-09-04 PROCEDURE — 82962 GLUCOSE BLOOD TEST: CPT

## 2022-09-04 PROCEDURE — 94640 AIRWAY INHALATION TREATMENT: CPT

## 2022-09-04 PROCEDURE — 74011636637 HC RX REV CODE- 636/637: Performed by: INTERNAL MEDICINE

## 2022-09-04 PROCEDURE — 74011000250 HC RX REV CODE- 250: Performed by: INTERNAL MEDICINE

## 2022-09-04 PROCEDURE — 65270000029 HC RM PRIVATE

## 2022-09-04 PROCEDURE — 74011250637 HC RX REV CODE- 250/637: Performed by: NURSE PRACTITIONER

## 2022-09-04 PROCEDURE — 94668 MNPJ CHEST WALL SBSQ: CPT

## 2022-09-04 RX ORDER — PREDNISONE 20 MG/1
20 TABLET ORAL 2 TIMES DAILY WITH MEALS
Status: DISCONTINUED | OUTPATIENT
Start: 2022-09-05 | End: 2022-09-05

## 2022-09-04 RX ORDER — FUROSEMIDE 40 MG/1
20 TABLET ORAL DAILY
Status: DISCONTINUED | OUTPATIENT
Start: 2022-09-05 | End: 2022-09-05 | Stop reason: HOSPADM

## 2022-09-04 RX ORDER — SORBITOL SOLUTION 70 %
30 SOLUTION, ORAL MISCELLANEOUS DAILY PRN
Status: DISCONTINUED | OUTPATIENT
Start: 2022-09-04 | End: 2022-09-05 | Stop reason: HOSPADM

## 2022-09-04 RX ORDER — BUDESONIDE AND FORMOTEROL FUMARATE DIHYDRATE 160; 4.5 UG/1; UG/1
2 AEROSOL RESPIRATORY (INHALATION)
Status: DISCONTINUED | OUTPATIENT
Start: 2022-09-04 | End: 2022-09-05 | Stop reason: HOSPADM

## 2022-09-04 RX ORDER — IPRATROPIUM BROMIDE AND ALBUTEROL SULFATE 2.5; .5 MG/3ML; MG/3ML
3 SOLUTION RESPIRATORY (INHALATION)
Status: DISCONTINUED | OUTPATIENT
Start: 2022-09-04 | End: 2022-09-05 | Stop reason: HOSPADM

## 2022-09-04 RX ADMIN — GUAIFENESIN 1200 MG: 600 TABLET, EXTENDED RELEASE ORAL at 22:23

## 2022-09-04 RX ADMIN — FUROSEMIDE 40 MG: 10 INJECTION, SOLUTION INTRAMUSCULAR; INTRAVENOUS at 08:34

## 2022-09-04 RX ADMIN — GUAIFENESIN 1200 MG: 600 TABLET, EXTENDED RELEASE ORAL at 10:47

## 2022-09-04 RX ADMIN — TRAZODONE HYDROCHLORIDE 50 MG: 50 TABLET ORAL at 22:23

## 2022-09-04 RX ADMIN — ASPIRIN 81 MG: 81 TABLET, COATED ORAL at 08:35

## 2022-09-04 RX ADMIN — INSULIN LISPRO 2 UNITS: 100 INJECTION, SOLUTION INTRAVENOUS; SUBCUTANEOUS at 11:59

## 2022-09-04 RX ADMIN — ACETYLCYSTEINE 400 MG: 200 SOLUTION ORAL; RESPIRATORY (INHALATION) at 07:30

## 2022-09-04 RX ADMIN — IPRATROPIUM BROMIDE AND ALBUTEROL SULFATE 3 ML: .5; 2.5 SOLUTION RESPIRATORY (INHALATION) at 13:20

## 2022-09-04 RX ADMIN — SODIUM CHLORIDE, PRESERVATIVE FREE 5 ML: 5 INJECTION INTRAVENOUS at 13:48

## 2022-09-04 RX ADMIN — INSULIN LISPRO 2 UNITS: 100 INJECTION, SOLUTION INTRAVENOUS; SUBCUTANEOUS at 22:23

## 2022-09-04 RX ADMIN — PRAMIPEXOLE DIHYDROCHLORIDE 0.5 MG: 0.12 TABLET ORAL at 22:23

## 2022-09-04 RX ADMIN — SODIUM CHLORIDE, PRESERVATIVE FREE 5 ML: 5 INJECTION INTRAVENOUS at 05:01

## 2022-09-04 RX ADMIN — IPRATROPIUM BROMIDE AND ALBUTEROL SULFATE 3 ML: .5; 2.5 SOLUTION RESPIRATORY (INHALATION) at 07:31

## 2022-09-04 RX ADMIN — LEVOFLOXACIN 750 MG: 500 TABLET, FILM COATED ORAL at 12:00

## 2022-09-04 RX ADMIN — POTASSIUM CHLORIDE 10 MEQ: 750 TABLET, FILM COATED, EXTENDED RELEASE ORAL at 08:35

## 2022-09-04 RX ADMIN — SORBITOL SOLUTION (BULK) 30 ML: 70 SOLUTION at 20:07

## 2022-09-04 RX ADMIN — ACETAMINOPHEN 650 MG: 325 TABLET, FILM COATED ORAL at 08:35

## 2022-09-04 RX ADMIN — ACETYLCYSTEINE 400 MG: 200 SOLUTION ORAL; RESPIRATORY (INHALATION) at 13:20

## 2022-09-04 RX ADMIN — IPRATROPIUM BROMIDE AND ALBUTEROL SULFATE 3 ML: .5; 2.5 SOLUTION RESPIRATORY (INHALATION) at 19:17

## 2022-09-04 RX ADMIN — SODIUM CHLORIDE, PRESERVATIVE FREE 5 ML: 5 INJECTION INTRAVENOUS at 22:24

## 2022-09-04 RX ADMIN — RIVAROXABAN 10 MG: 10 TABLET, FILM COATED ORAL at 08:35

## 2022-09-04 RX ADMIN — MONTELUKAST 10 MG: 10 TABLET, FILM COATED ORAL at 08:35

## 2022-09-04 RX ADMIN — POLYETHYLENE GLYCOL 3350 17 G: 17 POWDER, FOR SOLUTION ORAL at 02:43

## 2022-09-04 RX ADMIN — ATORVASTATIN CALCIUM 40 MG: 40 TABLET, FILM COATED ORAL at 08:35

## 2022-09-04 RX ADMIN — METHYLPREDNISOLONE SODIUM SUCCINATE 40 MG: 40 INJECTION, POWDER, FOR SOLUTION INTRAMUSCULAR; INTRAVENOUS at 08:34

## 2022-09-04 RX ADMIN — INSULIN LISPRO 6 UNITS: 100 INJECTION, SOLUTION INTRAVENOUS; SUBCUTANEOUS at 16:12

## 2022-09-04 RX ADMIN — PANTOPRAZOLE SODIUM 40 MG: 40 TABLET, DELAYED RELEASE ORAL at 08:35

## 2022-09-04 RX ADMIN — DULOXETINE HYDROCHLORIDE 30 MG: 30 CAPSULE, DELAYED RELEASE ORAL at 22:23

## 2022-09-04 RX ADMIN — CETIRIZINE HYDROCHLORIDE 10 MG: 10 TABLET, FILM COATED ORAL at 08:35

## 2022-09-04 RX ADMIN — BUDESONIDE AND FORMOTEROL FUMARATE DIHYDRATE 2 PUFF: 160; 4.5 AEROSOL RESPIRATORY (INHALATION) at 19:17

## 2022-09-04 RX ADMIN — DULOXETINE HYDROCHLORIDE 30 MG: 30 CAPSULE, DELAYED RELEASE ORAL at 08:35

## 2022-09-04 RX ADMIN — INSULIN GLARGINE 10 UNITS: 100 INJECTION, SOLUTION SUBCUTANEOUS at 22:23

## 2022-09-04 NOTE — PROGRESS NOTES
PULMONARY NOTE  [facility]    Name: Marquita Patton MRN: [id number]   : 1955 Hospital: [facility]   Date: 2022  Admission date: 2022 Hospital Day: 5       HPI:     Hospital Problems  Date Reviewed: 8/3/2022            Codes Class Noted POA    COPD with acute exacerbation [facility]) ICD-10-CM: J44.1  ICD-9-CM: 491.21  2022 Unknown          [x] High complexity decision making was performed  [x] See my orders for details      Subjective/Initial History:     I was asked by Eddie Prajapati MD to see Marquita Patton  a 61 y.o.  female in consultation     Excerpts from admission 2022 or consult notes as follows:   44-year-old lady came in with other shortness of breath and dyspnea also having chest tightness cough she was complaining of dyspnea for the past week went to [facility] received antibiotic did not seem to help she is chronically on home oxygen follows with me regarding her oxygen and COPD she came to the hospital got admitted she had a history of PE in the past on Xarelto.   So admitted and pulmonary consult was called      No Known Allergies     MAR reviewed and pertinent medications noted or modified as needed     Current Facility-Administered Medications   Medication    albuterol-ipratropium (DUO-NEB) 2.5 MG-0.5 MG/3 ML    [START ON 2022] furosemide (LASIX) tablet 20 mg    [START ON 2022] predniSONE (DELTASONE) tablet 20 mg    levoFLOXacin (LEVAQUIN) tablet 750 mg    guaiFENesin ER (MUCINEX) tablet 1,200 mg    DULoxetine (CYMBALTA) capsule 30 mg    insulin glargine (LANTUS) injection 10 Units    acetylcysteine (MUCOMYST) 200 mg/mL (20 %) solution 400 mg    aspirin delayed-release tablet 81 mg    atorvastatin (LIPITOR) tablet 40 mg    cetirizine (ZYRTEC) tablet 10 mg    montelukast (SINGULAIR) tablet 10 mg    pantoprazole (PROTONIX) tablet 40 mg    potassium chloride SR (KLOR-CON 10) tablet 10 mEq    pramipexole (MIRAPEX) tablet 0.5 mg    [Held by provider] tiotropium bromide (SPIRIVA RESPIMAT) 2.5 mcg /actuation    rivaroxaban (XARELTO) tablet 10 mg    traZODone (DESYREL) tablet 50 mg    insulin lispro (HUMALOG) injection    glucose chewable tablet 16 g    glucagon (GLUCAGEN) injection 1 mg    dextrose 10% infusion 0-250 mL    sodium chloride (NS) flush 5-40 mL    sodium chloride (NS) flush 5-40 mL    acetaminophen (TYLENOL) tablet 650 mg    Or    acetaminophen (TYLENOL) suppository 650 mg    polyethylene glycol (MIRALAX) packet 17 g    ondansetron (ZOFRAN ODT) tablet 4 mg    Or    ondansetron (ZOFRAN) injection 4 mg      Patient PCP: Yanet Mg DO  PMH:  has a past medical history of Breast cancer ([facility] [address] (), Chronic obstructive pulmonary disease ([facility] [address] and Stroke (cerebrum) ([facility] [address] (2015). PSH:   has a past surgical history that includes hx breast lumpectomy (Right, ) and hx heart catheterization. FHX: family history includes Breast Cancer in her sister. SHX:  reports that she quit smoking about 6 years ago. Her smoking use included cigarettes. She has a 30.00 pack-year smoking history. She has never used smokeless tobacco. She reports that she does not currently use alcohol. She reports that she does not use drugs. ROS:    Review of Systems   Respiratory:  Positive for shortness of breath and wheezing. All other systems reviewed and are negative.      Objective:     Vital Signs: Telemetry:    normal sinus rhythm Intake/Output:   Visit Vitals  BP (!) 145/84 (BP 1 Location: Right upper arm, BP Patient Position: Sitting)   Pulse 91   Temp 98.2 °F (36.8 °C)   Resp 16   Ht 5' 8\" (1.727 m)   Wt 107 kg (236 lb)   SpO2 90%   Breastfeeding No   BMI 35.88 kg/m²       Temp (24hrs), Av.1 °F (36.7 °C), Min:97.5 °F (36.4 °C), Max:98.5 °F (36.9 °C)        O2 Device: Nasal cannula O2 Flow Rate (L/min): 3 l/min       Wt Readings from Last 4 Encounters:   22 107 kg (236 lb)   22 109.3 kg (241 lb)   22 113.4 kg (250 lb)   08/01/22 113.4 kg (250 lb)        No intake or output data in the 24 hours ending 09/04/22 1749      Last shift:      No intake/output data recorded. Last 3 shifts: No intake/output data recorded. Physical Exam:     Physical Exam  Constitutional:       Appearance: She is obese. Comments: No distress no accessory muscle usage she states she feels better   HENT:      Head: Normocephalic. Nose: Nose normal.      Mouth/Throat:      Mouth: Mucous membranes are moist.      Pharynx: Oropharynx is clear. Eyes:      Extraocular Movements: Extraocular movements intact. Conjunctiva/sclera: Conjunctivae normal.      Pupils: Pupils are equal, round, and reactive to light. Cardiovascular:      Rate and Rhythm: Normal rate and regular rhythm. Pulses: Normal pulses. Heart sounds: Normal heart sounds. Pulmonary:      Effort: Pulmonary effort is normal.      Breath sounds: Normal breath sounds. Comments: Diffuse expiratory rhonchi  Abdominal:      General: Abdomen is flat. Bowel sounds are normal.      Palpations: Abdomen is soft. Musculoskeletal:         General: Normal range of motion. Cervical back: Normal range of motion and neck supple. Skin:     General: Skin is warm and dry. Capillary Refill: Capillary refill takes less than 2 seconds. Neurological:      General: No focal deficit present. Mental Status: She is alert and oriented to person, place, and time. Mental status is at baseline. Psychiatric:         Mood and Affect: Mood normal.         Behavior: Behavior normal.         Thought Content:  Thought content normal.         Judgment: Judgment normal.        Labs:    Recent Labs     09/03/22  0830   WBC 13.3*   HGB 14.2          Recent Labs     09/04/22  0803 09/03/22  0830 09/02/22  1753    137 136   K 4.7 4.7 4.6   CL 98 98 98   CO2 35* 33* 32   * 196* 204*   BUN 28* 26* 27*   CREA 0.83 0.79 0.85   CA 9.6 9.7 10.0   MG  --  2.5* --          Lab Results   Component Value Date/Time    Culture result: No growth 4 days 08/31/2022 04:49 PM       Imaging:    CXR Results  (Last 48 hours)      None          Results from [place] encounter on 08/31/22    XR CHEST SNGL V    Narrative  INDICATION: Cough, headache, back pain, dyspnea, chest pain and burning. Portable AP view of the chest.    Direct comparison made to prior chest x-ray dated December 2021. Cardiomediastinal silhouette is stable. There are very mild bilateral increased  interstitial markings. There is no new focal airspace process. No pleural fluid  is visualized. There is no pneumothorax. Impression  No interval change. Results from Hospital Encounter encounter on 12/17/21    XR CHEST PA LAT    Narrative  Examination: XR CHEST PA LAT    History: COPD    Comparison: Chest radiograph 6/16/2021    FINDINGS:    2 views of the chest. Coarse interstitial lung markings diffusely could be  related to provided clinical history of COPD. No definite focal airspace  consolidation, pneumothorax, or significant pleural effusion. Cardiac silhouette  is normal in size. Mediastinal contours and osseous structures appear unchanged  without acute abnormality evident. Impression  Diffuse coarsened interstitial lung markings appearing similar to prior. No  findings of acute cardiopulmonary abnormality. Results from [place] encounter on 06/16/21    XR CHEST PA LAT    Narrative  Chest, 2 views, 6/16/2021    History: Chronic obstructive pulmonary disease. Comparison: Including chest 10/29/2020. Findings: The cardiac silhouette is within normal limits. The lungs are  adequately expanded. No hydrostatic edema is present. Reticular opacities in  the lungs and peribronchial thickening are not significantly changed compared to  chest 10/29/2020. No focal consolidation, pleural effusions or pneumothorax is  identified.    Degenerative changes are present in the thoracic spine. Impression  Reticular opacities and peribronchial thickening, not significantly  changed. No focal airspace consolidation is identified. Results from [place] encounter on 08/31/22    CTA CHEST W OR W WO CONT    Narrative  INDICATION:  Hypoxia; hx DVT/PE    EXAM:  CTA Chest with contrast for Pulmonary Embolus    COMPARISON:  10/29/2020    TECHNIQUE:  Following the uneventful intravenous administration of Iodinated  contrast, thin helical axial images were obtained through the chest. 3D image  postprocessing was performed. Coronal and sagittal reformatted images were  obtained. CT dose reduction was achieved through use of a standardized protocol  tailored for this examination and automatic exposure control for dose  modulation. FINDINGS:    No evidence for acute central pulmonary embolus. Limited evaluation of the  segmental pulmonary arteries bilaterally due to respiratory motion artifact. No evidence for thoracic aortic dissection. No pleural or pericardial effusion. Mildly enlarged left paratracheal lymph node appears larger measuring 14 mm  (image 27), compared to 8 mm previously. Scattered bilateral subsegmental atelectasis. No other significant pulmonary  abnormality. The central airways are patent    Nonspecific right breast subareolar low-density focus with calcification  measuring 17 mm. Mild to moderate degenerative changes in the spine    Impression  1. No evidence for acute central pulmonary embolus. Limited evaluation of the  segmental pulmonary arteries bilaterally due to respiratory motion artifact. 2. Bilateral subsegmental atelectasis without evidence for any other significant  pulmonary abnormality. 3. Slight increase in size of mildly enlarged left paratracheal superior  mediastinal lymph node. 4. Stable nonspecific right breast subareolar low-density lesion with  calcification.   5. Please refer to above findings for complete details. IMPRESSION:     Acute exacerbation chronic Obstructive Pulmonary Disease   Acute hypoxic respiratory failure now on her baseline 3 L nasal oxygen  Chronic hypoxic respiratory failure normally on 3 L oxygen  History of breast cancer depression anxiety      RECOMMENDATIONS/PLAN:     Feels better still wheezing continue nebulized albuterol Atrovent and oral prednisone Symbicort  No sputum production we will discontinue Mucomyst       9/1 Patient is a well-appearing 62 yo female that presented to the ED yesterday for shortness of breath and chest pain. Today she reports doing much better and is able to breathe well after being given albuterol-ipratropium. She was able to sleep soundly through the night. CT from yesterday reveals no evidence of acute PE. She does have bilateral atelectasis. Mild neutrophilia as expected after being given methylprednisolone. 9/2 Patient is well-appearing and in no acute distress. She would like to know whether she can be discharged to go home   9/3 still unable to bring sputum minimal cough continue with antibiotic pulmonary toilet we will increase Mucinex to 1200, she is not on oxygen at home we will continue to wean oxygen per protocol  9/4 feels better but still has diffuse expiratory rhonchi.   No sputum production will discontinue Mucomyst add inhaled Symbicort continue nebulized albuterol Atrovent      Alise Pinto MD

## 2022-09-04 NOTE — PROGRESS NOTES
Hospitalist Progress Note    Subjective:     Patient seen for follow up    Admission HPI/Hx:  Daiana Chavez is a 61 y.o. female who presents with worsening SOB, chest tightness, cough, chills, dry cough. Patient was seen at [facility] ER 5 days ago and prescribed Amoxicillin + Zithromax and Prednisone. She has known hx of COPD and is on Home O2 3L; follows with Dr. Juan F Greenwood. She has not received any Covid Vaccine. She states that she was tested for Covid at [facility]. Additionally, increased swelling. No known Cardiac Hx. Patient is on Xarelto for remote hx of DVT/PE; has had previous epistaxis but not recently. In ER patient required NRB due to severe hypoxia.     09/04/2022  Patient seen at bedside  She tells me her breathing is improving  She is in no acute distress  She is now on oxygen 3 liters vis [place] which is her baseline at home      Current Facility-Administered Medications   Medication Dose Route Frequency    albuterol-ipratropium (DUO-NEB) 2.5 MG-0.5 MG/3 ML  3 mL Nebulization Q6HWA RT    methylPREDNISolone (PF) (SOLU-MEDROL) injection 40 mg  40 mg IntraVENous Q12H    levoFLOXacin (LEVAQUIN) tablet 750 mg  750 mg Oral Q24H    guaiFENesin ER (MUCINEX) tablet 1,200 mg  1,200 mg Oral Q12H    DULoxetine (CYMBALTA) capsule 30 mg  30 mg Oral BID    insulin glargine (LANTUS) injection 10 Units  10 Units SubCUTAneous QHS    acetylcysteine (MUCOMYST) 200 mg/mL (20 %) solution 400 mg  400 mg Nebulization Q6HWA RT    aspirin delayed-release tablet 81 mg  81 mg Oral DAILY    atorvastatin (LIPITOR) tablet 40 mg  40 mg Oral DAILY    cetirizine (ZYRTEC) tablet 10 mg  10 mg Oral DAILY    montelukast (SINGULAIR) tablet 10 mg  10 mg Oral DAILY    pantoprazole (PROTONIX) tablet 40 mg  40 mg Oral DAILY    potassium chloride SR (KLOR-CON 10) tablet 10 mEq  10 mEq Oral DAILY    pramipexole (MIRAPEX) tablet 0.5 mg  0.5 mg Oral QHS    [Held by provider] tiotropium bromide (SPIRIVA RESPIMAT) 2.5 mcg /actuation  2 Puff Inhalation DAILY    rivaroxaban (XARELTO) tablet 10 mg  10 mg Oral DAILY WITH BREAKFAST    traZODone (DESYREL) tablet 50 mg  50 mg Oral QHS    insulin lispro (HUMALOG) injection   SubCUTAneous AC&HS    glucose chewable tablet 16 g  4 Tablet Oral PRN    glucagon (GLUCAGEN) injection 1 mg  1 mg IntraMUSCular PRN    dextrose 10% infusion 0-250 mL  0-250 mL IntraVENous PRN    sodium chloride (NS) flush 5-40 mL  5-40 mL IntraVENous Q8H    sodium chloride (NS) flush 5-40 mL  5-40 mL IntraVENous PRN    acetaminophen (TYLENOL) tablet 650 mg  650 mg Oral Q6H PRN    Or    acetaminophen (TYLENOL) suppository 650 mg  650 mg Rectal Q6H PRN    polyethylene glycol (MIRALAX) packet 17 g  17 g Oral DAILY PRN    ondansetron (ZOFRAN ODT) tablet 4 mg  4 mg Oral Q8H PRN    Or    ondansetron (ZOFRAN) injection 4 mg  4 mg IntraVENous Q6H PRN    furosemide (LASIX) injection 40 mg  40 mg IntraVENous BID        Review of Systems    Constitutional: No - fever, chills, weight change    HEENT: No - sore throat, sinus pressure, ear pain    Respiratory:  + sob, wheeze    Cardiovascular: No - chest pain, palpitations, extremity edema    Gastrointestinal: No - n/v/d/bleeding/abd pain    Genitourinary: No - dysuria, hematuria    Neurological: No - headache, focal weakness, LOC, paresthesia    Skin: No - new rash, discoloration    M/S: No - joint pain, back pain, injury      Objective:     Visit Vitals  /72 (BP 1 Location: Right upper arm, BP Patient Position: Sitting)   Pulse 82   Temp 98.5 °F (36.9 °C)   Resp 16   Ht 5' 8\" (1.727 m)   Wt 107 kg (236 lb)   SpO2 93%   Breastfeeding No   BMI 35.88 kg/m²    O2 Flow Rate (L/min): 3 l/min O2 Device: Nasal cannula    Temp (24hrs), Av.9 °F (36.6 °C), Min:97.5 °F (36.4 °C), Max:98.5 °F (36.9 °C)      PHYSICAL EXAM    Constitutional: NAD, Awake; sitting bedside    Neck: Supple     Cardiovascular: S1S2    Respiratory:  Bilat post wheeze and coarse breath sounds     GI: Soft, NT; no rigidity; + bowel sounds    Musculoskeletal: Moving all extremities spontaneously; no overt injury; Neurological:  AxOx3; No new focal weakness; No tremors    Psychiatric: Appropriate mood; oriented    Skin: No new rash or significant discoloration     Vascular: Palpable pulses; No edema      Data Review    Recent Results (from the past 24 hour(s))   GLUCOSE, POC    Collection Time: 09/03/22 12:06 PM   Result Value Ref Range    Glucose (POC) 211 (H) 65 - 100 mg/dL    Performed by Junaid Cabrera, POC    Collection Time: 09/03/22  4:57 PM   Result Value Ref Range    Glucose (POC) 109 (H) 65 - 100 mg/dL    Performed by Junaid Cabrera, POC    Collection Time: 09/03/22  7:10 PM   Result Value Ref Range    Glucose (POC) 132 (H) 65 - 100 mg/dL    Performed by Jose De Jesus Raines, BASIC    Collection Time: 09/04/22  8:03 AM   Result Value Ref Range    Sodium 136 136 - 145 mmol/L    Potassium 4.7 3.5 - 5.1 mmol/L    Chloride 98 97 - 108 mmol/L    CO2 35 (H) 21 - 32 mmol/L    Anion gap 3 (L) 5 - 15 mmol/L    Glucose 159 (H) 65 - 100 mg/dL    BUN 28 (H) 6 - 20 mg/dL    Creatinine 0.83 0.55 - 1.02 mg/dL    BUN/Creatinine ratio 34 (H) 12 - 20      GFR est AA >60 >60 ml/min/1.73m2    GFR est non-AA >60 >60 ml/min/1.73m2    Calcium 9.6 8.5 - 10.1 mg/dL   GLUCOSE, POC    Collection Time: 09/04/22  8:31 AM   Result Value Ref Range    Glucose (POC) 138 (H) 65 - 100 mg/dL    Performed by Korina Gleason        CTA CHEST W OR W WO CONT   Final Result      1. No evidence for acute central pulmonary embolus. Limited evaluation of the   segmental pulmonary arteries bilaterally due to respiratory motion artifact. 2. Bilateral subsegmental atelectasis without evidence for any other significant   pulmonary abnormality. 3. Slight increase in size of mildly enlarged left paratracheal superior   mediastinal lymph node. 4. Stable nonspecific right breast subareolar low-density lesion with   calcification.    5. Please refer to above findings for complete details. XR CHEST SNGL V   Final Result   No interval change. Active Problems:    COPD with acute exacerbation ([facility] [address] (8/31/2022)      Assessment/Plan:     1. Acute/Chronic Hypoxemic Resp Failure with hx of Chronic O2,  2. COPD  3. DVT/PE   4. DM2    - Continues to improve  - Failed outpatient mgmt with Prednisone, Amoxil/Zithro  - Change IV Solumedrol to  prednisone 20 mg bid  - Continue, Nebs, O2, Empiric Levaquin  - CTA Chest (given hx of PE): Negative for PE; Bilat Atelectasis  - Echo on 09/02/2022 showed a normal left ventricular systolic function with EF of 60-65%   - Change IV lasix 40 mg bid to PO lasix 20 mg daily  - Continue Xarelto for DVT/PE  - SSI and Lantus for hyperglycemia d/t steroids  - She is now on oxygen 3 liters vis [place] which is her baseline at home      DVT Prophylaxis: On Xeralto  Code Status: Full Code  Dispo: Likely tomorrow.     ________________________________________  Sade Cole NP

## 2022-09-04 NOTE — PROGRESS NOTES
Problem: General Medical Care Plan  Goal: *Vital signs within specified parameters  Outcome: Progressing Towards Goal  Goal: *Labs within defined limits  Outcome: Progressing Towards Goal  Goal: *Absence of infection signs and symptoms  Outcome: Progressing Towards Goal  Goal: *Optimal pain control at patient's stated goal  Outcome: Progressing Towards Goal  Goal: *Skin integrity maintained  Outcome: Progressing Towards Goal  Goal: *Fluid volume balance  Outcome: Progressing Towards Goal  Goal: *Optimize nutritional status  Outcome: Progressing Towards Goal  Goal: *Anxiety reduced or absent  Outcome: Progressing Towards Goal  Goal: *Progressive mobility and function (eg: ADL's)  Outcome: Progressing Towards Goal     Problem: Patient Education: Go to Patient Education Activity  Goal: Patient/Family Education  Outcome: Progressing Towards Goal     Problem: Chronic Obstructive Pulmonary Disease (COPD)  Goal: *Oxygen saturation during activity within specified parameters  Outcome: Progressing Towards Goal  Goal: *Able to remain out of bed as prescribed  Outcome: Progressing Towards Goal  Goal: *Absence of hypoxia  Outcome: Progressing Towards Goal  Goal: *Optimize nutritional status  Outcome: Progressing Towards Goal     Problem: Falls - Risk of  Goal: *Absence of Falls  Description: Document Harley Fall Risk and appropriate interventions in the flowsheet. Outcome: Progressing Towards Goal  Note: Fall Risk Interventions:            Medication Interventions: Teach patient to arise slowly     Pt tolerated Oral and IV meds well this am. Pt ambulating independently in Room w/o difficulty. No further issues or concerns at this time.                Problem: Patient Education: Go to Patient Education Activity  Goal: Patient/Family Education  Outcome: Progressing Towards Goal

## 2022-09-04 NOTE — PROGRESS NOTES
Problem: General Medical Care Plan  Goal: *Vital signs within specified parameters  Outcome: Progressing Towards Goal     Problem: General Medical Care Plan  Goal: *Optimal pain control at patient's stated goal  Outcome: Progressing Towards Goal     Problem: General Medical Care Plan  Goal: *Skin integrity maintained  Outcome: Progressing Towards Goal

## 2022-09-04 NOTE — PROGRESS NOTES
Bedside shift change report given to Maryjo Schultz (oncoming nurse) by Britney (offgoing nurse). Report included the following information SBAR, MAR, and Recent Results.

## 2022-09-05 VITALS
TEMPERATURE: 97.5 F | OXYGEN SATURATION: 94 % | BODY MASS INDEX: 35.77 KG/M2 | RESPIRATION RATE: 16 BRPM | SYSTOLIC BLOOD PRESSURE: 150 MMHG | HEIGHT: 68 IN | HEART RATE: 67 BPM | DIASTOLIC BLOOD PRESSURE: 85 MMHG | WEIGHT: 236 LBS

## 2022-09-05 LAB
GLUCOSE BLD STRIP.AUTO-MCNC: 164 MG/DL (ref 65–100)
PERFORMED BY, TECHID: ABNORMAL

## 2022-09-05 PROCEDURE — 94640 AIRWAY INHALATION TREATMENT: CPT

## 2022-09-05 PROCEDURE — 74011250637 HC RX REV CODE- 250/637: Performed by: INTERNAL MEDICINE

## 2022-09-05 PROCEDURE — 94761 N-INVAS EAR/PLS OXIMETRY MLT: CPT

## 2022-09-05 PROCEDURE — 74011636637 HC RX REV CODE- 636/637: Performed by: NURSE PRACTITIONER

## 2022-09-05 PROCEDURE — 74011250637 HC RX REV CODE- 250/637: Performed by: NURSE PRACTITIONER

## 2022-09-05 PROCEDURE — 74011000250 HC RX REV CODE- 250: Performed by: INTERNAL MEDICINE

## 2022-09-05 PROCEDURE — 77010033678 HC OXYGEN DAILY

## 2022-09-05 PROCEDURE — 94668 MNPJ CHEST WALL SBSQ: CPT

## 2022-09-05 PROCEDURE — 82962 GLUCOSE BLOOD TEST: CPT

## 2022-09-05 PROCEDURE — 74011636637 HC RX REV CODE- 636/637: Performed by: INTERNAL MEDICINE

## 2022-09-05 RX ORDER — FUROSEMIDE 40 MG/1
20 TABLET ORAL DAILY
Qty: 15 TABLET | Refills: 0 | Status: SHIPPED | OUTPATIENT
Start: 2022-09-05 | End: 2022-10-05

## 2022-09-05 RX ORDER — PREDNISONE 20 MG/1
20 TABLET ORAL
Qty: 3 TABLET | Refills: 0 | Status: SHIPPED | OUTPATIENT
Start: 2022-09-06 | End: 2022-09-09

## 2022-09-05 RX ORDER — PREDNISONE 20 MG/1
20 TABLET ORAL
Status: DISCONTINUED | OUTPATIENT
Start: 2022-09-06 | End: 2022-09-05 | Stop reason: HOSPADM

## 2022-09-05 RX ORDER — ACETAMINOPHEN 325 MG/1
650 TABLET ORAL
Qty: 180 TABLET | Refills: 0 | Status: SHIPPED
Start: 2022-09-05 | End: 2022-09-05

## 2022-09-05 RX ORDER — BUDESONIDE AND FORMOTEROL FUMARATE DIHYDRATE 160; 4.5 UG/1; UG/1
2 AEROSOL RESPIRATORY (INHALATION) 2 TIMES DAILY
Qty: 10.2 G | Refills: 0 | Status: SHIPPED | OUTPATIENT
Start: 2022-09-05 | End: 2022-10-01

## 2022-09-05 RX ORDER — LEVOFLOXACIN 750 MG/1
750 TABLET ORAL EVERY 24 HOURS
Qty: 2 TABLET | Refills: 0 | Status: SHIPPED | OUTPATIENT
Start: 2022-09-05 | End: 2022-09-07

## 2022-09-05 RX ADMIN — ASPIRIN 81 MG: 81 TABLET, COATED ORAL at 09:00

## 2022-09-05 RX ADMIN — PANTOPRAZOLE SODIUM 40 MG: 40 TABLET, DELAYED RELEASE ORAL at 09:00

## 2022-09-05 RX ADMIN — IPRATROPIUM BROMIDE AND ALBUTEROL SULFATE 3 ML: .5; 2.5 SOLUTION RESPIRATORY (INHALATION) at 07:29

## 2022-09-05 RX ADMIN — ATORVASTATIN CALCIUM 40 MG: 40 TABLET, FILM COATED ORAL at 09:00

## 2022-09-05 RX ADMIN — FUROSEMIDE 20 MG: 40 TABLET ORAL at 09:00

## 2022-09-05 RX ADMIN — MONTELUKAST 10 MG: 10 TABLET, FILM COATED ORAL at 09:00

## 2022-09-05 RX ADMIN — POTASSIUM CHLORIDE 10 MEQ: 750 TABLET, FILM COATED, EXTENDED RELEASE ORAL at 09:00

## 2022-09-05 RX ADMIN — RIVAROXABAN 10 MG: 10 TABLET, FILM COATED ORAL at 09:00

## 2022-09-05 RX ADMIN — SODIUM CHLORIDE, PRESERVATIVE FREE 5 ML: 5 INJECTION INTRAVENOUS at 05:16

## 2022-09-05 RX ADMIN — PREDNISONE 20 MG: 20 TABLET ORAL at 09:00

## 2022-09-05 RX ADMIN — BUDESONIDE AND FORMOTEROL FUMARATE DIHYDRATE 2 PUFF: 160; 4.5 AEROSOL RESPIRATORY (INHALATION) at 07:29

## 2022-09-05 RX ADMIN — INSULIN LISPRO 2 UNITS: 100 INJECTION, SOLUTION INTRAVENOUS; SUBCUTANEOUS at 09:01

## 2022-09-05 RX ADMIN — LEVOFLOXACIN 750 MG: 500 TABLET, FILM COATED ORAL at 12:22

## 2022-09-05 RX ADMIN — DULOXETINE HYDROCHLORIDE 30 MG: 30 CAPSULE, DELAYED RELEASE ORAL at 09:01

## 2022-09-05 RX ADMIN — GUAIFENESIN 1200 MG: 600 TABLET, EXTENDED RELEASE ORAL at 09:00

## 2022-09-05 RX ADMIN — CETIRIZINE HYDROCHLORIDE 10 MG: 10 TABLET, FILM COATED ORAL at 09:00

## 2022-09-05 NOTE — DISCHARGE SUMMARY
Hospitalist Discharge Summary     Patient ID:    Marely Rizo  [id number]  61 y.o.  1955    Admit date: 8/31/2022    Discharge date : 9/5/2022    Chronic Diagnoses:    Problem List as of 9/5/2022 Date Reviewed: 8/3/2022            Codes Class Noted - Resolved    COPD with acute exacerbation ([facility] ICD-10-CM: J44.1  ICD-9-CM: 491.21  8/31/2022 - Present        Ulcer, nasal, septum ICD-10-CM: J34.0  ICD-9-CM: 478.19  11/22/2021 - Present        Oxygen dependent ICD-10-CM: Z99.81  ICD-9-CM: V46.2  9/29/2021 - Present        Epistaxis ICD-10-CM: R04.0  ICD-9-CM: 784.7  9/20/2021 - Present        Bleeding from the nose ICD-10-CM: R04.0  ICD-9-CM: 784.7  9/18/2021 - Present        Anticoagulated by anticoagulation treatment ICD-10-CM: Z79.01  ICD-9-CM: V58.61  9/18/2021 - Present       22    Final Diagnoses: Active Problems:    COPD with acute exacerbation ([facility] (8/31/2022)      Reason for Hospitalization:  Dyspnea, cough    Hospital Course:   Pt admittted for worsening dyspnea, associated cough. She has history of COPD and chronic dependence on home oxygen. She presented with acute respiratory failure with hypoxia. CT chest  did not show any evidence of PE, there is bilateral atelectasis. She was started on duo nebs,  IV steroids and course of oral levaquin x 5 days. Pt improved with with treatment and is back to baseline continuous oxygen. CXR does not show any interval changes. Pt to continue with current medications and follow up with PCP in one month, follow up with pulmonary in 2 weeks. Pt reports starting an new maintenance inhaler prescribed by her usual pulmonary provider. Pt to continue with oral steroids and antibiotics for 2 more days, continue with other current home medications, and is stable for discharge home today.      Discharge Medications:   Current Discharge Medication List        START taking these medications    Details   predniSONE (DELTASONE) 20 mg tablet Take 1 Tablet by mouth daily (with breakfast) for 3 days. Qty: 3 Tablet, Refills: 0  Start date: 9/6/2022, End date: 9/9/2022      levoFLOXacin (LEVAQUIN) 750 mg tablet Take 1 Tablet by mouth every twenty-four (24) hours for 2 days. Qty: 2 Tablet, Refills: 0  Start date: 9/5/2022, End date: 9/7/2022      budesonide-formoteroL (SYMBICORT) 160-4.5 mcg/actuation HFAA Take 2 Puffs by inhalation two (2) times a day for 26 days. Qty: 10.2 g, Refills: 0  Start date: 9/5/2022, End date: 10/1/2022           CONTINUE these medications which have CHANGED    Details   furosemide (LASIX) 40 mg tablet Take 0.5 Tablets by mouth daily for 30 days. Qty: 15 Tablet, Refills: 0  Start date: 9/5/2022, End date: 10/5/2022           CONTINUE these medications which have NOT CHANGED    Details   acetaminophen (TYLENOL) 500 mg tablet Take 500 mg by mouth as needed for Pain. albuterol-ipratropium (DUO-NEB) 2.5 mg-0.5 mg/3 ml nebu 3 mL by Nebulization route every six (6) hours as needed for Wheezing. clonazePAM (KlonoPIN) 0.5 mg tablet Take 0.5 mg by mouth nightly. atorvastatin (LIPITOR) 40 mg tablet Take 40 mg by mouth daily. DULoxetine (CYMBALTA) 30 mg capsule Take 30 mg by mouth two (2) times a day. Xarelto 10 mg tablet Take 10 mg by mouth daily. pramipexole (MIRAPEX) 0.25 mg tablet Take 0.25 mg by mouth nightly. traZODone (DESYREL) 50 mg tablet Take 50 mg by mouth nightly. montelukast (SINGULAIR) 10 mg tablet Take 10 mg by mouth daily. tiotropium (SPIRIVA) 18 mcg inhalation capsule Take 1 Capsule by inhalation daily. potassium chloride SR (KLOR-CON 10) 10 mEq tablet Take 10 mEq by mouth daily. cyanocobalamin 1,000 mcg tablet Take 5,000 mcg by mouth daily. Omeprazole delayed release (PRILOSEC D/R) 20 mg tablet Take 20 mg by mouth daily.       albuterol (PROVENTIL HFA, VENTOLIN HFA, PROAIR HFA) 90 mcg/actuation inhaler Take 2 Puffs by inhalation every six (6) hours as needed for Wheezing. fexofenadine (ALLEGRA) 180 mg tablet Take 180 mg by mouth daily. cholecalciferol (VITAMIN D3) (1000 Units /25 mcg) tablet Take 1,000 Units by mouth daily. aspirin delayed-release 81 mg tablet Take 81 mg by mouth daily. levothyroxine (SYNTHROID) 75 mcg tablet Take 1 Tablet by mouth Daily (before breakfast) for 30 days. Qty: 30 Tablet, Refills: 0           STOP taking these medications       linaCLOtide (LINZESS) 145 mcg cap capsule Comments:   Reason for Stopping:         amoxicillin-clavulanate (AUGMENTIN) 875-125 mg per tablet Comments:   Reason for Stopping: Follow up Care:    1. Dixie Soto DO in 1 month    Follow-up Information       Follow up With Specialties Details Why [address] [facility] Follow up in 1 month(s) Please make appointment [address]  [place] [address]  [phone]      Keara Gipson MD Pulmonary Disease Follow up in 2 week(s) PLEASE MAKE APPOINTMENT [address] [facility]  [phone]                * Follow-up Care/Patient Instructions: Activity: Activity as tolerated  Diet: Regular Diet  Wound Care: None needed      Condition at Discharge:  Stable  __________________________________________________________________    Disposition  Home or Self Care  ____________________________________________________________________    Code Status:  Full Code  ___________________________________________________________________    Discharge Exam:  Patient seen and examined by me on discharge day. Physical Exam  Constitutional:       General: She is not in acute distress. Appearance: She is obese. Cardiovascular:      Rate and Rhythm: Normal rate and regular rhythm. Pulses: Normal pulses. Heart sounds: Normal heart sounds.    Pulmonary:      Effort: Pulmonary effort is normal.      Comments: Diminished in bases, few scattered exp wheezes  Abdominal:      General: Bowel sounds are normal.      Palpations: Abdomen is soft. Musculoskeletal:         General: Normal range of motion. Skin:     General: Skin is warm and dry. Neurological:      Mental Status: She is oriented to person, place, and time. Psychiatric:         Behavior: Behavior normal.        CONSULTATIONS: Pulmonary/Intensive care    Significant Diagnostic Studies:   Recent Results (from the past 24 hour(s))   GLUCOSE, POC    Collection Time: 09/04/22  3:39 PM   Result Value Ref Range    Glucose (POC) 256 (H) 65 - 100 mg/dL    Performed by Kandy Lynn, POC    Collection Time: 09/04/22  7:11 PM   Result Value Ref Range    Glucose (POC) 183 (H) 65 - 100 mg/dL    Performed by [address] [place] [address], POC    Collection Time: 09/05/22  7:37 AM   Result Value Ref Range    Glucose (POC) 164 (H) 65 - 100 mg/dL    Performed by Manjit Werner      CTA CHEST W OR W WO CONT   Final Result      1. No evidence for acute central pulmonary embolus. Limited evaluation of the   segmental pulmonary arteries bilaterally due to respiratory motion artifact. 2. Bilateral subsegmental atelectasis without evidence for any other significant   pulmonary abnormality. 3. Slight increase in size of mildly enlarged left paratracheal superior   mediastinal lymph node. 4. Stable nonspecific right breast subareolar low-density lesion with   calcification. 5. Please refer to above findings for complete details. XR CHEST SNGL V   Final Result   No interval change. Discharge: time spent  35 minutes in discharge  Education and counseling.      Signed:  Holly Bishop NP  9/5/2022  9:47 AM

## 2022-09-05 NOTE — PROGRESS NOTES
DC order noted. Chart reviewed. Medicare pt has received, reviewed, and signed 2nd IM letter informing them of their right to appeal the discharge. Signed copy has been filed in Med Rec Dept. No other intervention required by CM for this Home/Self Care discharge. Please call [phone] if status changes and assist is needed.

## 2022-09-05 NOTE — PROGRESS NOTES
PULMONARY NOTE  [facility]    Name: Sofi Hogan MRN: [id number]   : 1955 Hospital: [facility]   Date: 2022  Admission date: 2022 Hospital Day: 6       HPI:     Hospital Problems  Date Reviewed: 8/3/2022            Codes Class Noted POA    COPD with acute exacerbation [facility]) ICD-10-CM: J44.1  ICD-9-CM: 491.21  2022 Unknown          [x] High complexity decision making was performed  [x] See my orders for details      Subjective/Initial History:     I was asked by Juan A Welsh MD to see Sofi Hogan  a 61 y.o.  female in consultation     Excerpts from admission 2022 or consult notes as follows:   70-year-old lady came in with other shortness of breath and dyspnea also having chest tightness cough she was complaining of dyspnea for the past week went to [facility] received antibiotic did not seem to help she is chronically on home oxygen follows with me regarding her oxygen and COPD she came to the hospital got admitted she had a history of PE in the past on Xarelto.   So admitted and pulmonary consult was called      No Known Allergies     MAR reviewed and pertinent medications noted or modified as needed     Current Facility-Administered Medications   Medication    albuterol-ipratropium (DUO-NEB) 2.5 MG-0.5 MG/3 ML    furosemide (LASIX) tablet 20 mg    predniSONE (DELTASONE) tablet 20 mg    budesonide-formoteroL (SYMBICORT) 160-4.5 mcg/actuation HFA inhaler 2 Puff    sorbitoL 70 % solution 30 mL    levoFLOXacin (LEVAQUIN) tablet 750 mg    guaiFENesin ER (MUCINEX) tablet 1,200 mg    DULoxetine (CYMBALTA) capsule 30 mg    insulin glargine (LANTUS) injection 10 Units    aspirin delayed-release tablet 81 mg    atorvastatin (LIPITOR) tablet 40 mg    cetirizine (ZYRTEC) tablet 10 mg    montelukast (SINGULAIR) tablet 10 mg    pantoprazole (PROTONIX) tablet 40 mg    potassium chloride SR (KLOR-CON 10) tablet 10 mEq    pramipexole (MIRAPEX) tablet 0.5 mg    [Held by provider] tiotropium bromide (SPIRIVA RESPIMAT) 2.5 mcg /actuation    rivaroxaban (XARELTO) tablet 10 mg    traZODone (DESYREL) tablet 50 mg    insulin lispro (HUMALOG) injection    glucose chewable tablet 16 g    glucagon (GLUCAGEN) injection 1 mg    dextrose 10% infusion 0-250 mL    sodium chloride (NS) flush 5-40 mL    sodium chloride (NS) flush 5-40 mL    acetaminophen (TYLENOL) tablet 650 mg    Or    acetaminophen (TYLENOL) suppository 650 mg    polyethylene glycol (MIRALAX) packet 17 g    ondansetron (ZOFRAN ODT) tablet 4 mg    Or    ondansetron (ZOFRAN) injection 4 mg      Patient PCP: Jenny Mcarthur DO  PMH:  has a past medical history of Breast cancer ([facility] (), Chronic obstructive pulmonary disease ([facility] and Stroke (cerebrum) ([facility] (2015). PSH:   has a past surgical history that includes hx breast lumpectomy (Right, 2015) and hx heart catheterization. FHX: family history includes Breast Cancer in her sister. SHX:  reports that she quit smoking about 6 years ago. Her smoking use included cigarettes. She has a 30.00 pack-year smoking history. She has never used smokeless tobacco. She reports that she does not currently use alcohol. She reports that she does not use drugs. ROS:    Review of Systems   Respiratory:  Positive for shortness of breath and wheezing. All other systems reviewed and are negative.      Objective:     Vital Signs: Telemetry:    normal sinus rhythm Intake/Output:   Visit Vitals  BP (!) 150/85 (BP 1 Location: Right upper arm, BP Patient Position: Sitting)   Pulse 67   Temp 97.5 °F (36.4 °C)   Resp 16   Ht 5' 8\" (1.727 m)   Wt 107 kg (236 lb)   SpO2 94%   Breastfeeding No   BMI 35.88 kg/m²       Temp (24hrs), Av.9 °F (36.6 °C), Min:97.5 °F (36.4 °C), Max:98.2 °F (36.8 °C)        O2 Device: Nasal cannula O2 Flow Rate (L/min): 3 l/min       Wt Readings from Last 4 Encounters:   22 107 kg (236 lb)   22 109.3 kg (241 lb)   22 113.4 kg (250 lb)   08/01/22 113.4 kg (250 lb)        No intake or output data in the 24 hours ending 09/05/22 0942      Last shift:      No intake/output data recorded. Last 3 shifts: No intake/output data recorded. Physical Exam:     Physical Exam  Constitutional:       Appearance: She is obese. Comments: No distress no accessory muscle usage she states she feels better   HENT:      Head: Normocephalic. Nose: Nose normal.      Mouth/Throat:      Mouth: Mucous membranes are moist.      Pharynx: Oropharynx is clear. Eyes:      Extraocular Movements: Extraocular movements intact. Conjunctiva/sclera: Conjunctivae normal.      Pupils: Pupils are equal, round, and reactive to light. Cardiovascular:      Rate and Rhythm: Normal rate and regular rhythm. Pulses: Normal pulses. Heart sounds: Normal heart sounds. Pulmonary:      Effort: Pulmonary effort is normal.      Breath sounds: Normal breath sounds. Comments: Diffuse expiratory rhonchi  Abdominal:      General: Abdomen is flat. Bowel sounds are normal.      Palpations: Abdomen is soft. Musculoskeletal:         General: Normal range of motion. Cervical back: Normal range of motion and neck supple. Skin:     General: Skin is warm and dry. Capillary Refill: Capillary refill takes less than 2 seconds. Neurological:      General: No focal deficit present. Mental Status: She is alert and oriented to person, place, and time. Mental status is at baseline. Psychiatric:         Mood and Affect: Mood normal.         Behavior: Behavior normal.         Thought Content:  Thought content normal.         Judgment: Judgment normal.        Labs:    Recent Labs     09/03/22  0830   WBC 13.3*   HGB 14.2          Recent Labs     09/04/22  0803 09/03/22  0830 09/02/22  1753    137 136   K 4.7 4.7 4.6   CL 98 98 98   CO2 35* 33* 32   * 196* 204*   BUN 28* 26* 27*   CREA 0.83 0.79 0.85   CA 9.6 9.7 10.0   MG --  2.5*  --          Lab Results   Component Value Date/Time    Culture result: No growth 4 days 08/31/2022 04:49 PM       Imaging:    CXR Results  (Last 48 hours)      None          Results from Hospital Encounter encounter on 08/31/22    XR CHEST SNGL V    Narrative  INDICATION: Cough, headache, back pain, dyspnea, chest pain and burning. Portable AP view of the chest.    Direct comparison made to prior chest x-ray dated December 2021. Cardiomediastinal silhouette is stable. There are very mild bilateral increased  interstitial markings. There is no new focal airspace process. No pleural fluid  is visualized. There is no pneumothorax. Impression  No interval change. Results from Hospital Encounter encounter on 12/17/21    XR CHEST PA LAT    Narrative  Examination: XR CHEST PA LAT    History: COPD    Comparison: Chest radiograph 6/16/2021    FINDINGS:    2 views of the chest. Coarse interstitial lung markings diffusely could be  related to provided clinical history of COPD. No definite focal airspace  consolidation, pneumothorax, or significant pleural effusion. Cardiac silhouette  is normal in size. Mediastinal contours and osseous structures appear unchanged  without acute abnormality evident. Impression  Diffuse coarsened interstitial lung markings appearing similar to prior. No  findings of acute cardiopulmonary abnormality. Results from [place] encounter on 06/16/21    XR CHEST PA LAT    Narrative  Chest, 2 views, 6/16/2021    History: Chronic obstructive pulmonary disease. Comparison: Including chest 10/29/2020. Findings: The cardiac silhouette is within normal limits. The lungs are  adequately expanded. No hydrostatic edema is present. Reticular opacities in  the lungs and peribronchial thickening are not significantly changed compared to  chest 10/29/2020. No focal consolidation, pleural effusions or pneumothorax is  identified.    Degenerative changes are present in the thoracic spine. Impression  Reticular opacities and peribronchial thickening, not significantly  changed. No focal airspace consolidation is identified. Results from [place] encounter on 08/31/22    CTA CHEST W OR W WO CONT    Narrative  INDICATION:  Hypoxia; hx DVT/PE    EXAM:  CTA Chest with contrast for Pulmonary Embolus    COMPARISON:  10/29/2020    TECHNIQUE:  Following the uneventful intravenous administration of Iodinated  contrast, thin helical axial images were obtained through the chest. 3D image  postprocessing was performed. Coronal and sagittal reformatted images were  obtained. CT dose reduction was achieved through use of a standardized protocol  tailored for this examination and automatic exposure control for dose  modulation. FINDINGS:    No evidence for acute central pulmonary embolus. Limited evaluation of the  segmental pulmonary arteries bilaterally due to respiratory motion artifact. No evidence for thoracic aortic dissection. No pleural or pericardial effusion. Mildly enlarged left paratracheal lymph node appears larger measuring 14 mm  (image 27), compared to 8 mm previously. Scattered bilateral subsegmental atelectasis. No other significant pulmonary  abnormality. The central airways are patent    Nonspecific right breast subareolar low-density focus with calcification  measuring 17 mm. Mild to moderate degenerative changes in the spine    Impression  1. No evidence for acute central pulmonary embolus. Limited evaluation of the  segmental pulmonary arteries bilaterally due to respiratory motion artifact. 2. Bilateral subsegmental atelectasis without evidence for any other significant  pulmonary abnormality. 3. Slight increase in size of mildly enlarged left paratracheal superior  mediastinal lymph node. 4. Stable nonspecific right breast subareolar low-density lesion with  calcification.   5. Please refer to above findings for complete details. IMPRESSION:     Acute exacerbation chronic Obstructive Pulmonary Disease   Acute hypoxic respiratory failure now on her baseline 3 L nasal oxygen  Chronic hypoxic respiratory failure normally on 3 L oxygen  History of breast cancer depression anxiety      RECOMMENDATIONS/PLAN:     Feels better still wheezing continue nebulized albuterol Atrovent and oral prednisone Symbicort  No sputum production we will discontinue Mucomyst  She already had oxygen at home     9/1 Patient is a well-appearing 62 yo female that presented to the ED yesterday for shortness of breath and chest pain. Today she reports doing much better and is able to breathe well after being given albuterol-ipratropium. She was able to sleep soundly through the night. CT from yesterday reveals no evidence of acute PE. She does have bilateral atelectasis. Mild neutrophilia as expected after being given methylprednisolone. 9/2 Patient is well-appearing and in no acute distress. She would like to know whether she can be discharged to go home   9/3 still unable to bring sputum minimal cough continue with antibiotic pulmonary toilet we will increase Mucinex to 1200, she is not on oxygen at home we will continue to wean oxygen per protocol  9/4 feels better but still has diffuse expiratory rhonchi. No sputum production will discontinue Mucomyst add inhaled Symbicort continue nebulized albuterol Atrovent  9/5 Patient in good spirits eating breakfast. She is to be discharged today. She is feeling much better and has no questions or concerns.       Aixa Hyatt MD

## 2022-09-05 NOTE — PROGRESS NOTES
Problem: General Medical Care Plan  Goal: *Vital signs within specified parameters  Outcome: Progressing Towards Goal  Goal: *Labs within defined limits  Outcome: Progressing Towards Goal  Goal: *Absence of infection signs and symptoms  Outcome: Progressing Towards Goal  Goal: *Optimal pain control at patient's stated goal  Outcome: Progressing Towards Goal  Goal: *Skin integrity maintained  Outcome: Progressing Towards Goal  Goal: *Fluid volume balance  Outcome: Progressing Towards Goal  Goal: *Optimize nutritional status  Outcome: Progressing Towards Goal  Goal: *Anxiety reduced or absent  Outcome: Progressing Towards Goal  Goal: *Progressive mobility and function (eg: ADL's)  Outcome: Progressing Towards Goal     Problem: Patient Education: Go to Patient Education Activity  Goal: Patient/Family Education  Outcome: Progressing Towards Goal     Problem: Chronic Obstructive Pulmonary Disease (COPD)  Goal: *Oxygen saturation during activity within specified parameters  Outcome: Progressing Towards Goal  Goal: *Able to remain out of bed as prescribed  Outcome: Progressing Towards Goal  Goal: *Absence of hypoxia  Outcome: Progressing Towards Goal  Goal: *Optimize nutritional status  Outcome: Progressing Towards Goal     Problem: Falls - Risk of  Goal: *Absence of Falls  Description: Document Harley Fall Risk and appropriate interventions in the flowsheet. Outcome: Progressing Towards Goal  Note: Fall Risk Interventions:            Medication Interventions: Teach patient to arise slowly     Pt medically cleared to DC, Morning medication and education given. Pt has ride 20 mins away and will call once DC paper work is given. Pt has her own portable O2 with her and is hopeful to go home.               Problem: Patient Education: Go to Patient Education Activity  Goal: Patient/Family Education  Outcome: Progressing Towards Goal

## 2022-09-05 NOTE — PROGRESS NOTES
Problem: General Medical Care Plan  Goal: *Vital signs within specified parameters  Outcome: Progressing Towards Goal     Problem: General Medical Care Plan  Goal: *Fluid volume balance  Outcome: Progressing Towards Goal

## 2022-09-05 NOTE — PROGRESS NOTES
PULMONARY NOTE  [facility]    Name: John Herbert MRN: [id number]   : 1955 Hospital: [facility]   Date: 2022  Admission date: 2022 Hospital Day: 6       HPI:     Hospital Problems  Date Reviewed: 8/3/2022            Codes Class Noted POA    COPD with acute exacerbation [facility]) ICD-10-CM: J44.1  ICD-9-CM: 491.21  2022 Unknown          [x] High complexity decision making was performed  [x] See my orders for details      Subjective/Initial History:     I was asked by Pamela Coburn MD to see John Herbert  a 61 y.o.  female in consultation     Excerpts from admission 2022 or consult notes as follows:   42-year-old lady came in with other shortness of breath and dyspnea also having chest tightness cough she was complaining of dyspnea for the past week went to [facility] received antibiotic did not seem to help she is chronically on home oxygen follows with me regarding her oxygen and COPD she came to the hospital got admitted she had a history of PE in the past on Xarelto.   So admitted and pulmonary consult was called      No Known Allergies     MAR reviewed and pertinent medications noted or modified as needed     Current Facility-Administered Medications   Medication    albuterol-ipratropium (DUO-NEB) 2.5 MG-0.5 MG/3 ML    furosemide (LASIX) tablet 20 mg    predniSONE (DELTASONE) tablet 20 mg    budesonide-formoteroL (SYMBICORT) 160-4.5 mcg/actuation HFA inhaler 2 Puff    sorbitoL 70 % solution 30 mL    levoFLOXacin (LEVAQUIN) tablet 750 mg    guaiFENesin ER (MUCINEX) tablet 1,200 mg    DULoxetine (CYMBALTA) capsule 30 mg    insulin glargine (LANTUS) injection 10 Units    aspirin delayed-release tablet 81 mg    atorvastatin (LIPITOR) tablet 40 mg    cetirizine (ZYRTEC) tablet 10 mg    montelukast (SINGULAIR) tablet 10 mg    pantoprazole (PROTONIX) tablet 40 mg    potassium chloride SR (KLOR-CON 10) tablet 10 mEq    pramipexole (MIRAPEX) tablet 0.5 mg    [Held by provider] tiotropium bromide (SPIRIVA RESPIMAT) 2.5 mcg /actuation    rivaroxaban (XARELTO) tablet 10 mg    traZODone (DESYREL) tablet 50 mg    insulin lispro (HUMALOG) injection    glucose chewable tablet 16 g    glucagon (GLUCAGEN) injection 1 mg    dextrose 10% infusion 0-250 mL    sodium chloride (NS) flush 5-40 mL    sodium chloride (NS) flush 5-40 mL    acetaminophen (TYLENOL) tablet 650 mg    Or    acetaminophen (TYLENOL) suppository 650 mg    polyethylene glycol (MIRALAX) packet 17 g    ondansetron (ZOFRAN ODT) tablet 4 mg    Or    ondansetron (ZOFRAN) injection 4 mg      Patient PCP: Clare Franklin DO  PMH:  has a past medical history of Breast cancer ([facility] [address] (), Chronic obstructive pulmonary disease ([facility] [address] and Stroke (cerebrum) ([facility] [address] (2015). PSH:   has a past surgical history that includes hx breast lumpectomy (Right, 2015) and hx heart catheterization. FHX: family history includes Breast Cancer in her sister. SHX:  reports that she quit smoking about 6 years ago. Her smoking use included cigarettes. She has a 30.00 pack-year smoking history. She has never used smokeless tobacco. She reports that she does not currently use alcohol. She reports that she does not use drugs. ROS:    Review of Systems   Respiratory:  Positive for shortness of breath and wheezing. All other systems reviewed and are negative.      Objective:     Vital Signs: Telemetry:    normal sinus rhythm Intake/Output:   Visit Vitals  BP (!) 150/85 (BP 1 Location: Right upper arm, BP Patient Position: Sitting)   Pulse 67   Temp 97.5 °F (36.4 °C)   Resp 16   Ht 5' 8\" (1.727 m)   Wt 107 kg (236 lb)   SpO2 94%   Breastfeeding No   BMI 35.88 kg/m²       Temp (24hrs), Av.1 °F (36.7 °C), Min:97.5 °F (36.4 °C), Max:98.5 °F (36.9 °C)        O2 Device: Nasal cannula O2 Flow Rate (L/min): 3 l/min       Wt Readings from Last 4 Encounters:   22 107 kg (236 lb)   22 109.3 kg (241 lb)   22 113.4 kg (250 lb)   08/01/22 113.4 kg (250 lb)        No intake or output data in the 24 hours ending 09/05/22 0665      Last shift:      No intake/output data recorded. Last 3 shifts: No intake/output data recorded. Physical Exam:     Physical Exam  Constitutional:       Appearance: She is obese. Comments: No distress no accessory muscle usage she states she feels better   HENT:      Head: Normocephalic. Nose: Nose normal.      Mouth/Throat:      Mouth: Mucous membranes are moist.      Pharynx: Oropharynx is clear. Eyes:      Extraocular Movements: Extraocular movements intact. Conjunctiva/sclera: Conjunctivae normal.      Pupils: Pupils are equal, round, and reactive to light. Cardiovascular:      Rate and Rhythm: Normal rate and regular rhythm. Pulses: Normal pulses. Heart sounds: Normal heart sounds. Pulmonary:      Effort: Pulmonary effort is normal.      Breath sounds: Normal breath sounds. Comments: Diffuse expiratory rhonchi  Abdominal:      General: Abdomen is flat. Bowel sounds are normal.      Palpations: Abdomen is soft. Musculoskeletal:         General: Normal range of motion. Cervical back: Normal range of motion and neck supple. Skin:     General: Skin is warm and dry. Capillary Refill: Capillary refill takes less than 2 seconds. Neurological:      General: No focal deficit present. Mental Status: She is alert and oriented to person, place, and time. Mental status is at baseline. Psychiatric:         Mood and Affect: Mood normal.         Behavior: Behavior normal.         Thought Content:  Thought content normal.         Judgment: Judgment normal.        Labs:    Recent Labs     09/03/22  0830   WBC 13.3*   HGB 14.2          Recent Labs     09/04/22  0803 09/03/22  0830 09/02/22  1753    137 136   K 4.7 4.7 4.6   CL 98 98 98   CO2 35* 33* 32   * 196* 204*   BUN 28* 26* 27*   CREA 0.83 0.79 0.85   CA 9.6 9.7 10.0   MG --  2.5*  --          Lab Results   Component Value Date/Time    Culture result: No growth 4 days 08/31/2022 04:49 PM       Imaging:    CXR Results  (Last 48 hours)      None          Results from Hospital Encounter encounter on 08/31/22    XR CHEST SNGL V    Narrative  INDICATION: Cough, headache, back pain, dyspnea, chest pain and burning. Portable AP view of the chest.    Direct comparison made to prior chest x-ray dated December 2021. Cardiomediastinal silhouette is stable. There are very mild bilateral increased  interstitial markings. There is no new focal airspace process. No pleural fluid  is visualized. There is no pneumothorax. Impression  No interval change. Results from Hospital Encounter encounter on 12/17/21    XR CHEST PA LAT    Narrative  Examination: XR CHEST PA LAT    History: COPD    Comparison: Chest radiograph 6/16/2021    FINDINGS:    2 views of the chest. Coarse interstitial lung markings diffusely could be  related to provided clinical history of COPD. No definite focal airspace  consolidation, pneumothorax, or significant pleural effusion. Cardiac silhouette  is normal in size. Mediastinal contours and osseous structures appear unchanged  without acute abnormality evident. Impression  Diffuse coarsened interstitial lung markings appearing similar to prior. No  findings of acute cardiopulmonary abnormality. Results from [place] encounter on 06/16/21    XR CHEST PA LAT    Narrative  Chest, 2 views, 6/16/2021    History: Chronic obstructive pulmonary disease. Comparison: Including chest 10/29/2020. Findings: The cardiac silhouette is within normal limits. The lungs are  adequately expanded. No hydrostatic edema is present. Reticular opacities in  the lungs and peribronchial thickening are not significantly changed compared to  chest 10/29/2020. No focal consolidation, pleural effusions or pneumothorax is  identified.    Degenerative changes are present in the thoracic spine. Impression  Reticular opacities and peribronchial thickening, not significantly  changed. No focal airspace consolidation is identified. Results from [place] encounter on 08/31/22    CTA CHEST W OR W WO CONT    Narrative  INDICATION:  Hypoxia; hx DVT/PE    EXAM:  CTA Chest with contrast for Pulmonary Embolus    COMPARISON:  10/29/2020    TECHNIQUE:  Following the uneventful intravenous administration of Iodinated  contrast, thin helical axial images were obtained through the chest. 3D image  postprocessing was performed. Coronal and sagittal reformatted images were  obtained. CT dose reduction was achieved through use of a standardized protocol  tailored for this examination and automatic exposure control for dose  modulation. FINDINGS:    No evidence for acute central pulmonary embolus. Limited evaluation of the  segmental pulmonary arteries bilaterally due to respiratory motion artifact. No evidence for thoracic aortic dissection. No pleural or pericardial effusion. Mildly enlarged left paratracheal lymph node appears larger measuring 14 mm  (image 27), compared to 8 mm previously. Scattered bilateral subsegmental atelectasis. No other significant pulmonary  abnormality. The central airways are patent    Nonspecific right breast subareolar low-density focus with calcification  measuring 17 mm. Mild to moderate degenerative changes in the spine    Impression  1. No evidence for acute central pulmonary embolus. Limited evaluation of the  segmental pulmonary arteries bilaterally due to respiratory motion artifact. 2. Bilateral subsegmental atelectasis without evidence for any other significant  pulmonary abnormality. 3. Slight increase in size of mildly enlarged left paratracheal superior  mediastinal lymph node. 4. Stable nonspecific right breast subareolar low-density lesion with  calcification.   5. Please refer to above findings for complete details. IMPRESSION:     Acute exacerbation chronic Obstructive Pulmonary Disease   Acute hypoxic respiratory failure now on her baseline 3 L nasal oxygen  Chronic hypoxic respiratory failure normally on 3 L oxygen  History of breast cancer depression anxiety      RECOMMENDATIONS/PLAN:     Feels better still wheezing continue nebulized albuterol Atrovent and oral prednisone Symbicort  No sputum production we will discontinue Mucomyst       9/1 Patient is a well-appearing 62 yo female that presented to the ED yesterday for shortness of breath and chest pain. Today she reports doing much better and is able to breathe well after being given albuterol-ipratropium. She was able to sleep soundly through the night. CT from yesterday reveals no evidence of acute PE. She does have bilateral atelectasis. Mild neutrophilia as expected after being given methylprednisolone. 9/2 Patient is well-appearing and in no acute distress. She would like to know whether she can be discharged to go home   9/3 still unable to bring sputum minimal cough continue with antibiotic pulmonary toilet we will increase Mucinex to 1200, she is not on oxygen at home we will continue to wean oxygen per protocol  9/4 feels better but still has diffuse expiratory rhonchi. No sputum production will discontinue Mucomyst add inhaled Symbicort continue nebulized albuterol Atrovent  9/5 Patient in good spirits eating breakfast. She is to be discharged today. She is feeling much better and has no questions or concerns.       Ofilia Kin

## 2022-09-06 LAB
BACTERIA SPEC CULT: NORMAL
SPECIAL REQUESTS,SREQ: NORMAL

## 2022-09-07 ENCOUNTER — PATIENT OUTREACH (OUTPATIENT)
Dept: CASE MANAGEMENT | Age: 67
End: 2022-09-07

## 2022-09-08 NOTE — PROGRESS NOTES
Care Transitions Initial Call    Call within 2 business days of discharge: Yes     Patient: Griffin Iyer Patient : 1955 MRN: [id number]    Last Discharge  Street       Date Complaint Diagnosis Description Type Department Provider    22 Cough; Headache; Back Pain Acute respiratory failure with hypoxia (HCC) . .. ED to Hosp-Admission (Discharged) (ADMIT) Annika Martinez MD; Perri Herrera. .. Was this an external facility discharge? No     Challenges to be reviewed by the provider   Additional needs identified to be addressed with provider: no       Method of communication with provider : none    Discussed COVID-19 related testing which was available at this time. Test results were negative. Patient informed of results, if available? yes     Advance Care Planning:   Does patient have an Advance Directive: not on file. Inpatient Readmission Risk score: Unplanned Readmit Risk Score: 15.8    Was this a readmission? no   Patient stated reason for the admission: COPD    Patients top risk factors for readmission: medical condition-COPD and medication management   Interventions to address risk factors: Scheduled appointment with PCP-22, Obtained and reviewed discharge summary and/or continuity of care documents, and Assessment and support for treatment adherence and medication management-[facility] Transition Nurse (CTN) contacted the patient by telephone to perform post hospital discharge assessment. Verified name and  with patient as identifiers. Provided introduction to self, and explanation of the CTN role. CTN reviewed discharge instructions, medical action plan and red flags with patient who verbalized understanding. Were discharge instructions available to patient? yes. Reviewed appropriate site of care based on symptoms and resources available to patient including: PCP, Specialist, and Condition related references.  Patient given an opportunity to ask questions and does not have any further questions or concerns at this time. The patient agrees to contact the PCP office for questions related to their healthcare. Medication reconciliation was performed with patient, who verbalizes understanding of administration of home medications. Advised obtaining a 90-day supply of all daily and as-needed medications. Referral to Pharm D needed: no     Home Health/Outpatient orders at discharge: none    Durable Medical Equipment ordered at discharge: None    Was patient discharged with a pulse oximeter? no    Discussed follow-up appointments. If no appointment was previously scheduled, appointment scheduling offered:  na . Is follow up appointment scheduled within 7 days of discharge? no.   [facility] follow up appointment(s): No future appointments. Non-[facility] follow up appointment(s): PCP 9/20/22    Plan for follow-up call in 5-7 days based on severity of symptoms and risk factors. Plan for next call: symptom management-cough, SOB, follow up appointment-pulm, and medication management-levaquin complete  CTN provided contact information for future needs. Goals Addressed                   This Visit's Progress     Prevent complications post hospitalization. 9/8/22  Home oxygen-O2 protocols (ie fall risk with tubing, no smoking, oxygen in use sign), pulse ox to monitor oxygen sats  Activity- activity intolerance/energy conservation/frequent rest, HH PT/OT to increase mobility and prevent falls. Medication compliance  Attend ALEXEY appt  Patient will monitor/report red flags- increased SOB, wheezing, worsening cough, excess phlegm/mucus, increased fatigue, using relief inhaler/nebulizer more often/medication not helping. KOMAL

## 2022-09-16 ENCOUNTER — PATIENT OUTREACH (OUTPATIENT)
Dept: CASE MANAGEMENT | Age: 67
End: 2022-09-16

## 2022-09-16 NOTE — PROGRESS NOTES
Goals Addressed                   This Visit's Progress     Prevent complications post hospitalization. 9/8/22  Home oxygen-O2 protocols (ie fall risk with tubing, no smoking, oxygen in use sign), pulse ox to monitor oxygen sats  Activity- activity intolerance/energy conservation/frequent rest, HH PT/OT to increase mobility and prevent falls. Medication compliance  Attend ALEXEY appt  Patient will monitor/report red flags- increased SOB, wheezing, worsening cough, excess phlegm/mucus, increased fatigue, using relief inhaler/nebulizer more often/medication not helping. KOMAL  9/16/22 Patient called on home number on file. Message left on voicemail with contact information to return call to this writer. KOMAL

## 2022-09-27 ENCOUNTER — PATIENT OUTREACH (OUTPATIENT)
Dept: CASE MANAGEMENT | Age: 67
End: 2022-09-27

## 2022-09-27 NOTE — PROGRESS NOTES
Goals Addressed                   This Visit's Progress     Prevent complications post hospitalization. 9/8/22  Home oxygen-O2 protocols (ie fall risk with tubing, no smoking, oxygen in use sign), pulse ox to monitor oxygen sats  Activity- activity intolerance/energy conservation/frequent rest, HH PT/OT to increase mobility and prevent falls. Medication compliance  Attend ALEXEY appt  Patient will monitor/report red flags- increased SOB, wheezing, worsening cough, excess phlegm/mucus, increased fatigue, using relief inhaler/nebulizer more often/medication not helping. KOMAL  9/16/22 Patient called on home number on file. Message left on voicemail with contact information to return call to this writer. KOMAL  9/27/22 Patient called on home number on file. Message left on voicemail with contact information to return call to this writer. KOMAL

## 2022-10-05 ENCOUNTER — PATIENT OUTREACH (OUTPATIENT)
Dept: CASE MANAGEMENT | Age: 67
End: 2022-10-05

## 2022-10-05 NOTE — PROGRESS NOTES
Patient has graduated from the Transitions of Care Coordination  program on 10/5/22. Patient/family has the ability to self-manage at this time Care management goals have been completed. Patient was not referred to the [facility] team for further management. Goals Addressed                   This Visit's Progress     Prevent complications post hospitalization. 9/8/22  Home oxygen-O2 protocols (ie fall risk with tubing, no smoking, oxygen in use sign), pulse ox to monitor oxygen sats  Activity- activity intolerance/energy conservation/frequent rest, HH PT/OT to increase mobility and prevent falls. Medication compliance  Attend ALEXEY appt  Patient will monitor/report red flags- increased SOB, wheezing, worsening cough, excess phlegm/mucus, increased fatigue, using relief inhaler/nebulizer more often/medication not helping. [facility]  9/16/22 Patient called on home number on file. Message left on voicemail with contact information to return call to this writer. [facility]  9/27/22 Patient called on home number on file. Message left on voicemail with contact information to return call to this writer. [facility]  9/27/22 1424 patient denies red flags, fatigue is improving, has provider appt 9/28/22. Patient will monitor red flags at report at next outreach. [facility]  10/5/22 Per review of chart patient has had no ED or hospital admissions 30 days post discharge. Goal complete. [facility]               Patient has Care Transition Nurse's contact information for any further questions, concerns, or needs. Patients upcoming visits:  No future appointments.

## 2022-11-09 ENCOUNTER — TELEPHONE (OUTPATIENT)
Dept: ENT CLINIC | Age: 67
End: 2022-11-09

## 2022-11-09 NOTE — TELEPHONE ENCOUNTER
Pt's daughter in law called stating that the pt was being treated by Dr. Juan J Horn for an ulcer in nose and nosebleeds, she stated that there is now a hole through her nose where the ulcer was that looks like it was rotting away. I stated that I would send a message to the nurse and see if there was a sooner appointment available since Dr. Navin Olguin next available is in January.  Best call back number is [phone] [facility]

## 2022-12-13 ENCOUNTER — OFFICE VISIT (OUTPATIENT)
Dept: ENT CLINIC | Age: 67
End: 2022-12-13
Payer: MEDICARE

## 2022-12-13 VITALS
WEIGHT: 238 LBS | DIASTOLIC BLOOD PRESSURE: 78 MMHG | SYSTOLIC BLOOD PRESSURE: 120 MMHG | HEART RATE: 76 BPM | BODY MASS INDEX: 36.07 KG/M2 | OXYGEN SATURATION: 92 % | HEIGHT: 68 IN | RESPIRATION RATE: 16 BRPM

## 2022-12-13 DIAGNOSIS — Z99.81 OXYGEN DEPENDENT: ICD-10-CM

## 2022-12-13 DIAGNOSIS — J34.2 DNS (DEVIATED NASAL SEPTUM): Primary | ICD-10-CM

## 2022-12-13 DIAGNOSIS — J34.89 NASAL SEPTUM PERFORATION: ICD-10-CM

## 2022-12-13 DIAGNOSIS — R04.0 EPISTAXIS: ICD-10-CM

## 2022-12-13 PROCEDURE — 3017F COLORECTAL CA SCREEN DOC REV: CPT | Performed by: OTOLARYNGOLOGY

## 2022-12-13 PROCEDURE — G8427 DOCREV CUR MEDS BY ELIG CLIN: HCPCS | Performed by: OTOLARYNGOLOGY

## 2022-12-13 PROCEDURE — G8432 DEP SCR NOT DOC, RNG: HCPCS | Performed by: OTOLARYNGOLOGY

## 2022-12-13 PROCEDURE — G9899 SCRN MAM PERF RSLTS DOC: HCPCS | Performed by: OTOLARYNGOLOGY

## 2022-12-13 PROCEDURE — G8417 CALC BMI ABV UP PARAM F/U: HCPCS | Performed by: OTOLARYNGOLOGY

## 2022-12-13 PROCEDURE — 99213 OFFICE O/P EST LOW 20 MIN: CPT | Performed by: OTOLARYNGOLOGY

## 2022-12-13 NOTE — PROGRESS NOTES
Subjective:    Juan F Arcos   61 y.o.   1955     Followup Visit    Location -nose    Quality -epistaxis    Severity -severe    Duration -10 days    Timing -intermittent    Context -patient following up from hospitalization she was discharged on 1 week ago. She had left nasal packing done was hospitalized due to blood loss and oxygen requirements. Since discharge her Xarelto has been cut down to 10 mg. She has not had bleeding since being home. She is using humidified oxygen and using saline. She admits to manipulating the left nostril with her finger    Modifying Features -none    Associated symptoms/signs -none    11/22/2021 -1 month follow-up, she is overall doing better. No further bleeding from the nose, continues on her routine for nasal moisturization and remains on a lower dose of the Xarelto. She states that the left side is feeling more blocked than usual.    2/23/22 - 3 mos f/u - still having some nasal pain left side, no bleeding; she had Covid last month had to use a full face mask for her O2; now back on nasal O2    8/3/2022 -6-month follow-up today, had nasal packing placed for epistaxis 2 days ago. States that she was applying Neosporin ointment to the left nose with a Q-tip when it started. She remains on her Xarelto. Has not held it    12/13/2022  4-month follow-up. She has not had much bleeding since the last visit but few weeks ago she felt as though she may have developed a perforation and is complaining of some more increased sensitivity particularly in the left nostril. Review of Systems  Review of Systems   Constitutional:  Negative for chills and fever. HENT:  Positive for nosebleeds. Negative for ear pain, hearing loss and tinnitus. Eyes:  Negative for blurred vision and double vision. Respiratory:  Positive for shortness of breath. Negative for cough and sputum production. Cardiovascular:  Negative for chest pain and palpitations.    Gastrointestinal: Negative for heartburn, nausea and vomiting. Musculoskeletal:  Negative for joint pain and neck pain. Skin: Negative. Neurological:  Negative for dizziness, speech change, weakness and headaches. Endo/Heme/Allergies:  Negative for environmental allergies. Bruises/bleeds easily. Psychiatric/Behavioral:  Negative for memory loss. The patient does not have insomnia. Past Medical History:   Diagnosis Date    Breast cancer ([facility] [address] 2015    Right    [facility]     Stroke (cerebrum) ([facility] 2015     Prior to Admission medications    Medication Sig Start Date End Date Taking? Authorizing Provider   acetaminophen (TYLENOL) 500 mg tablet Take 500 mg by mouth as needed for Pain. Yes Provider, Historical   albuterol-ipratropium (DUO-NEB) 2.5 mg-0.5 mg/3 ml nebu 3 mL by Nebulization route every six (6) hours as needed for Wheezing. Yes Provider, Historical   clonazePAM (KlonoPIN) 0.5 mg tablet Take 0.5 mg by mouth nightly. Yes Provider, Historical   atorvastatin (LIPITOR) 40 mg tablet Take 40 mg by mouth daily. Yes Provider, Historical   Xarelto 10 mg tablet Take 10 mg by mouth daily. 9/27/21  Yes Provider, Historical   levothyroxine (SYNTHROID) 75 mcg tablet Take 1 Tablet by mouth Daily (before breakfast) for 30 days. 9/22/21 9/1/23 Yes Norman Arreola NP   pramipexole (MIRAPEX) 0.25 mg tablet Take 0.25 mg by mouth nightly. Yes Provider, Historical   traZODone (DESYREL) 50 mg tablet Take 50 mg by mouth nightly. Yes Provider, Historical   montelukast (SINGULAIR) 10 mg tablet Take 10 mg by mouth daily. Yes Provider, Historical   tiotropium (SPIRIVA) 18 mcg inhalation capsule Take 1 Capsule by inhalation daily. Yes Provider, Historical   potassium chloride SR (KLOR-CON 10) 10 mEq tablet Take 10 mEq by mouth daily. Yes Provider, Historical   cyanocobalamin 1,000 mcg tablet Take 5,000 mcg by mouth daily.    Yes Provider, Historical   Omeprazole delayed release (PRILOSEC D/R) 20 mg tablet Take 20 mg by mouth daily. Yes Provider, Historical   albuterol (PROVENTIL HFA, VENTOLIN HFA, PROAIR HFA) 90 mcg/actuation inhaler Take 2 Puffs by inhalation every six (6) hours as needed for Wheezing. Yes Provider, Historical   fexofenadine (ALLEGRA) 180 mg tablet Take 180 mg by mouth daily. Yes Provider, Historical   cholecalciferol (VITAMIN D3) (1000 Units /25 mcg) tablet Take 1,000 Units by mouth daily. Yes Provider, Historical   aspirin delayed-release 81 mg tablet Take 81 mg by mouth daily. Yes Other, MD Rose   furosemide (LASIX) 40 mg tablet Take 0.5 Tablets by mouth daily for 30 days. 9/5/22 10/5/22  Casey Eduardo NP   DULoxetine (CYMBALTA) 30 mg capsule Take 30 mg by mouth two (2) times a day. Other, MD Rose            Objective:     Visit Vitals  /78 (BP 1 Location: Left upper arm, BP Patient Position: Sitting, BP Cuff Size: Adult)   Pulse 76   Resp 16   Ht 5' 8\" (1.727 m)   Wt 238 lb (108 kg)   SpO2 92%   BMI 36.19 kg/m²        Physical Exam  Vitals reviewed. Constitutional:       General: She is awake. She is not in acute distress. Appearance: Normal appearance. She is well-groomed. She is obese. Interventions: Nasal cannula in place. HENT:      Head: Normocephalic and atraumatic. Jaw: There is normal jaw occlusion. No trismus, tenderness or malocclusion. Salivary Glands: Right salivary gland is not diffusely enlarged or tender. Left salivary gland is not diffusely enlarged or tender. Right Ear: Tympanic membrane, ear canal and external ear normal. Decreased hearing noted. Left Ear: Tympanic membrane, ear canal and external ear normal. Decreased hearing noted. Ears:      Comments: Hearing aids     Nose: Septal deviation and mucosal edema present. No nasal deformity. Left Nostril: Epistaxis present. Right Turbinates: Not enlarged, swollen or pale. Left Turbinates: Not enlarged, swollen or pale.       Right Sinus: No maxillary sinus tenderness or frontal sinus tenderness. Left Sinus: No maxillary sinus tenderness or frontal sinus tenderness. Comments: There is a small approximately 1 cm nasal septal perforation now at the place of previous septal ulceration. Mild raw membranes surrounding but no active epistaxis     Mouth/Throat:      Lips: Pink. No lesions. Mouth: Mucous membranes are dry. No oral lesions. Dentition: Normal dentition. No dental caries. Tongue: No lesions. Palate: No mass and lesions. Pharynx: Oropharynx is clear. Uvula midline. No oropharyngeal exudate or posterior oropharyngeal erythema. Tonsils: No tonsillar exudate. 0 on the right. 0 on the left. Eyes:      General: Vision grossly intact. Extraocular Movements: Extraocular movements intact. Right eye: No nystagmus. Left eye: No nystagmus. Conjunctiva/sclera: Conjunctivae normal.      Pupils: Pupils are equal, round, and reactive to light. Neck:      Thyroid: No thyroid mass, thyromegaly or thyroid tenderness. Trachea: Trachea and phonation normal. No tracheal tenderness or tracheal deviation. Cardiovascular:      Rate and Rhythm: Normal rate and regular rhythm. Pulmonary:      Effort: Pulmonary effort is normal. No respiratory distress. Breath sounds: No stridor. Musculoskeletal:         General: No swelling or tenderness. Normal range of motion. Cervical back: No edema or erythema. Lymphadenopathy:      Cervical: No cervical adenopathy. Skin:     General: Skin is warm and dry. Findings: No lesion or rash. Neurological:      General: No focal deficit present. Mental Status: She is alert and oriented to person, place, and time. Mental status is at baseline. Coordination: Romberg sign negative. Gait: Gait is intact. Psychiatric:         Mood and Affect: Mood normal.         Behavior: Behavior normal. Behavior is cooperative. Assessment/Plan:     Encounter Diagnoses   Name Primary? DNS (deviated nasal septum) Yes    Oxygen dependent     Epistaxis     Nasal septum perforation      Her nasal septal ulceration has progressed to a perforation at this point. This may be overall an improvement as far as decreasing the amount of raw nasal membranes and risk for bleeding. She should continue to be diligent with saline gel and saline spray. She understands how to manage any epistaxis at home using oxymetazoline and cotton balls. Follow-up with me in 4 months or sooner if she is having issues. No orders of the defined types were placed in this encounter. Follow-up and Dispositions    Return in about 4 months (around 4/13/2023). Wojciech Pedraza MD, [address] [facility]    [address]  [facility]    [address]  [place], [place] [address] [place] [address]

## 2022-12-13 NOTE — LETTER
12/13/2022    Patient: Elyssa Murguia   YOB: 1955   Date of Visit: 12/13/2022     [address] [address]  Via Fax: [phone]    Dear Juanita Bragg,      Thank you for referring Ms. Chano Cortes to [facility] [address], [facility] for evaluation. My notes for this consultation are attached. If you have questions, please do not hesitate to call me. I look forward to following your patient along with you.       Sincerely,    Kahlil Canales MD

## 2022-12-26 ENCOUNTER — APPOINTMENT (OUTPATIENT)
Dept: GENERAL RADIOLOGY | Age: 67
End: 2022-12-26
Attending: STUDENT IN AN ORGANIZED HEALTH CARE EDUCATION/TRAINING PROGRAM
Payer: MEDICARE

## 2022-12-26 ENCOUNTER — HOSPITAL ENCOUNTER (EMERGENCY)
Age: 67
Discharge: HOME | End: 2022-12-27
Attending: STUDENT IN AN ORGANIZED HEALTH CARE EDUCATION/TRAINING PROGRAM
Payer: MEDICARE

## 2022-12-26 DIAGNOSIS — J44.1 COPD EXACERBATION (HCC): Primary | ICD-10-CM

## 2022-12-26 LAB
ALBUMIN SERPL-MCNC: 3.7 G/DL (ref 3.5–5)
ALBUMIN/GLOB SERPL: 1.2 {RATIO} (ref 1.1–2.2)
ALP SERPL-CCNC: 60 U/L (ref 45–117)
ALT SERPL-CCNC: 40 U/L (ref 12–78)
ANION GAP SERPL CALC-SCNC: 9 MMOL/L (ref 5–15)
AST SERPL W P-5'-P-CCNC: 36 U/L (ref 15–37)
BASOPHILS # BLD: 0.1 K/UL (ref 0–0.1)
BASOPHILS NFR BLD: 1 % (ref 0–1)
BILIRUB SERPL-MCNC: 0.5 MG/DL (ref 0.2–1)
BUN SERPL-MCNC: 18 MG/DL (ref 6–20)
BUN/CREAT SERPL: 28 (ref 12–20)
CA-I BLD-MCNC: 9.6 MG/DL (ref 8.5–10.1)
CHLORIDE SERPL-SCNC: 106 MMOL/L (ref 97–108)
CO2 SERPL-SCNC: 27 MMOL/L (ref 21–32)
CREAT SERPL-MCNC: 0.65 MG/DL (ref 0.55–1.02)
DIFFERENTIAL METHOD BLD: ABNORMAL
EOSINOPHIL # BLD: 0.1 K/UL (ref 0–0.4)
EOSINOPHIL NFR BLD: 1 % (ref 0–7)
ERYTHROCYTE [DISTWIDTH] IN BLOOD BY AUTOMATED COUNT: 15 % (ref 11.5–14.5)
GLOBULIN SER CALC-MCNC: 3.2 G/DL (ref 2–4)
GLUCOSE SERPL-MCNC: 102 MG/DL (ref 65–100)
HCT VFR BLD AUTO: 41.5 % (ref 35–47)
HGB BLD-MCNC: 13.6 G/DL (ref 11.5–16)
IMM GRANULOCYTES # BLD AUTO: 0 K/UL (ref 0–0.04)
IMM GRANULOCYTES NFR BLD AUTO: 0 % (ref 0–0.5)
LYMPHOCYTES # BLD: 2.6 K/UL (ref 0.8–3.5)
LYMPHOCYTES NFR BLD: 37 % (ref 12–49)
MCH RBC QN AUTO: 29.3 PG (ref 26–34)
MCHC RBC AUTO-ENTMCNC: 32.8 G/DL (ref 30–36.5)
MCV RBC AUTO: 89.4 FL (ref 80–99)
MONOCYTES # BLD: 0.7 K/UL (ref 0–1)
MONOCYTES NFR BLD: 10 % (ref 5–13)
NEUTS SEG # BLD: 3.7 K/UL (ref 1.8–8)
NEUTS SEG NFR BLD: 51 % (ref 32–75)
NRBC # BLD: 0 K/UL (ref 0–0.01)
NRBC BLD-RTO: 0 PER 100 WBC
PLATELET # BLD AUTO: 149 K/UL (ref 150–400)
PMV BLD AUTO: 11.9 FL (ref 8.9–12.9)
POTASSIUM SERPL-SCNC: 4 MMOL/L (ref 3.5–5.1)
PROT SERPL-MCNC: 6.9 G/DL (ref 6.4–8.2)
RBC # BLD AUTO: 4.64 M/UL (ref 3.8–5.2)
SODIUM SERPL-SCNC: 142 MMOL/L (ref 136–145)
WBC # BLD AUTO: 7.2 K/UL (ref 3.6–11)

## 2022-12-26 PROCEDURE — 71045 X-RAY EXAM CHEST 1 VIEW: CPT

## 2022-12-26 PROCEDURE — 94640 AIRWAY INHALATION TREATMENT: CPT

## 2022-12-26 PROCEDURE — 99285 EMERGENCY DEPT VISIT HI MDM: CPT

## 2022-12-26 PROCEDURE — 36415 COLL VENOUS BLD VENIPUNCTURE: CPT

## 2022-12-26 PROCEDURE — A9270 NON-COVERED ITEM OR SERVICE: HCPCS | Performed by: STUDENT IN AN ORGANIZED HEALTH CARE EDUCATION/TRAINING PROGRAM

## 2022-12-26 PROCEDURE — 74011636637 HC RX REV CODE- 636/637: Performed by: STUDENT IN AN ORGANIZED HEALTH CARE EDUCATION/TRAINING PROGRAM

## 2022-12-26 PROCEDURE — 93005 ELECTROCARDIOGRAM TRACING: CPT

## 2022-12-26 PROCEDURE — 80053 COMPREHEN METABOLIC PANEL: CPT

## 2022-12-26 PROCEDURE — 74011000250 HC RX REV CODE- 250: Performed by: STUDENT IN AN ORGANIZED HEALTH CARE EDUCATION/TRAINING PROGRAM

## 2022-12-26 PROCEDURE — 85025 COMPLETE CBC W/AUTO DIFF WBC: CPT

## 2022-12-26 PROCEDURE — 94761 N-INVAS EAR/PLS OXIMETRY MLT: CPT

## 2022-12-26 RX ORDER — ALBUTEROL SULFATE 2.5 MG/.5ML
2.5 SOLUTION RESPIRATORY (INHALATION)
Status: DISPENSED | OUTPATIENT
Start: 2022-12-26 | End: 2022-12-26

## 2022-12-26 RX ORDER — PREDNISONE 20 MG/1
60 TABLET ORAL
Status: COMPLETED | OUTPATIENT
Start: 2022-12-26 | End: 2022-12-26

## 2022-12-26 RX ORDER — PREDNISONE 20 MG/1
60 TABLET ORAL
Status: DISCONTINUED | OUTPATIENT
Start: 2022-12-27 | End: 2022-12-26

## 2022-12-26 RX ORDER — IPRATROPIUM BROMIDE AND ALBUTEROL SULFATE 2.5; .5 MG/3ML; MG/3ML
3 SOLUTION RESPIRATORY (INHALATION)
Status: COMPLETED | OUTPATIENT
Start: 2022-12-26 | End: 2022-12-26

## 2022-12-26 RX ADMIN — PREDNISONE 60 MG: 20 TABLET ORAL at 22:41

## 2022-12-26 RX ADMIN — ALBUTEROL SULFATE 2.5 MG: 2.5 SOLUTION RESPIRATORY (INHALATION) at 22:25

## 2022-12-26 RX ADMIN — IPRATROPIUM BROMIDE AND ALBUTEROL SULFATE 3 ML: .5; 3 SOLUTION RESPIRATORY (INHALATION) at 22:25

## 2022-12-26 RX ADMIN — ALBUTEROL SULFATE 2.5 MG: 2.5 SOLUTION RESPIRATORY (INHALATION) at 22:26

## 2022-12-27 VITALS
TEMPERATURE: 98.4 F | HEIGHT: 68 IN | BODY MASS INDEX: 34.86 KG/M2 | OXYGEN SATURATION: 92 % | RESPIRATION RATE: 16 BRPM | DIASTOLIC BLOOD PRESSURE: 75 MMHG | SYSTOLIC BLOOD PRESSURE: 122 MMHG | WEIGHT: 230 LBS | HEART RATE: 72 BPM

## 2022-12-27 LAB
ATRIAL RATE: 69 BPM
CALCULATED P AXIS, ECG09: 49 DEGREES
CALCULATED R AXIS, ECG10: -54 DEGREES
CALCULATED T AXIS, ECG11: 28 DEGREES
DIAGNOSIS, 93000: NORMAL
P-R INTERVAL, ECG05: 170 MS
Q-T INTERVAL, ECG07: 406 MS
QRS DURATION, ECG06: 130 MS
QTC CALCULATION (BEZET), ECG08: 435 MS
VENTRICULAR RATE, ECG03: 69 BPM

## 2022-12-27 RX ORDER — IPRATROPIUM BROMIDE AND ALBUTEROL SULFATE 2.5; .5 MG/3ML; MG/3ML
3 SOLUTION RESPIRATORY (INHALATION)
Qty: 30 EACH | Refills: 1 | Status: SHIPPED | OUTPATIENT
Start: 2022-12-27

## 2022-12-27 RX ORDER — PREDNISONE 20 MG/1
40 TABLET ORAL DAILY
Qty: 8 TABLET | Refills: 0 | Status: SHIPPED | OUTPATIENT
Start: 2022-12-27 | End: 2022-12-31

## 2022-12-27 RX ORDER — ALBUTEROL SULFATE 90 UG/1
2 AEROSOL, METERED RESPIRATORY (INHALATION)
Qty: 18 G | Refills: 0 | Status: SHIPPED | OUTPATIENT
Start: 2022-12-27

## 2022-12-27 NOTE — ED PROVIDER NOTES
[facility]  EMERGENCY DEPARTMENT ENCOUNTER NOTE        Date: 12/26/2022  Patient Name: Jose Frey      History of Presenting Illness     Chief Complaint   Patient presents with    Shortness of Breath       History Provided By: Patient    HPI: Jose Frey, 74 y.o. female with PMH of CVA, COPD, and anxiety presents to the ED for shortness of breath. She woke up earlier with her oxygen being off her nose. She is normally on 3 L of oxygen. She had a panic attack. Was replaced again. Shortness of breath has improved but patient still complaining of little bit shortness of breath. No recent illnesses. No fever, chills or sweats. She is reporting chronic cough but that has not changed baseline. No chest pain, abdominal pain, or any other area that she is complaining of. She reports however after the anxiety episode and the panic attack that she had been hyperventilating she had contraction of her toes and fingers. Presenting complaint is of mild in severity, no known aggravating relieving factors without associated symptoms. PCP: Hernán Hooker, DO    Current Outpatient Medications   Medication Sig Dispense Refill    albuterol (PROVENTIL HFA, VENTOLIN HFA, PROAIR HFA) 90 mcg/actuation inhaler Take 2 Puffs by inhalation every six (6) hours as needed for Wheezing. 18 g 0    albuterol-ipratropium (DUO-NEB) 2.5 mg-0.5 mg/3 ml nebu 3 mL by Nebulization route every six (6) hours as needed for Wheezing. 30 Each 1    predniSONE (DELTASONE) 20 mg tablet Take 2 Tablets by mouth daily for 4 days. With Breakfast 8 Tablet 0    furosemide (LASIX) 40 mg tablet Take 0.5 Tablets by mouth daily for 30 days. 15 Tablet 0    acetaminophen (TYLENOL) 500 mg tablet Take 500 mg by mouth as needed for Pain. clonazePAM (KlonoPIN) 0.5 mg tablet Take 0.5 mg by mouth nightly. atorvastatin (LIPITOR) 40 mg tablet Take 40 mg by mouth daily.       DULoxetine (CYMBALTA) 30 mg capsule Take 30 mg by mouth two (2) times a day. Xarelto 10 mg tablet Take 10 mg by mouth daily. levothyroxine (SYNTHROID) 75 mcg tablet Take 1 Tablet by mouth Daily (before breakfast) for 30 days. 30 Tablet 0    pramipexole (MIRAPEX) 0.25 mg tablet Take 0.25 mg by mouth nightly. traZODone (DESYREL) 50 mg tablet Take 50 mg by mouth nightly. montelukast (SINGULAIR) 10 mg tablet Take 10 mg by mouth daily. tiotropium (SPIRIVA) 18 mcg inhalation capsule Take 1 Capsule by inhalation daily. potassium chloride SR (KLOR-CON 10) 10 mEq tablet Take 10 mEq by mouth daily. cyanocobalamin 1,000 mcg tablet Take 5,000 mcg by mouth daily. Omeprazole delayed release (PRILOSEC D/R) 20 mg tablet Take 20 mg by mouth daily. fexofenadine (ALLEGRA) 180 mg tablet Take 180 mg by mouth daily. cholecalciferol (VITAMIN D3) (1000 Units /25 mcg) tablet Take 1,000 Units by mouth daily. aspirin delayed-release 81 mg tablet Take 81 mg by mouth daily. Past History     Past Medical History:  Past Medical History:   Diagnosis Date    Breast cancer ([facility] [address] 2015    Right    Chronic obstructive pulmonary disease ([facility] [address] [facility]     Stroke (cerebrum) ([facility] [address] [facility]        Past Surgical History:  Past Surgical History:   Procedure Laterality Date    HX BREAST LUMPECTOMY Right 2015    HX HEART CATHETERIZATION         Family History:  Family History   Problem Relation Age of Onset    Breast Cancer Sister        Social History:  Social History     Tobacco Use    Smoking status: Former     Packs/day: 1.50     Years: 20.00     Pack years: 30.00     Types: Cigarettes     Quit date: 2016     Years since quittin.9    Smokeless tobacco: Never   Vaping Use    Vaping Use: Never used   Substance Use Topics    Alcohol use: Not Currently    Drug use: Never       Allergies:   Allergies   Allergen Reactions    Codeine Unknown (comments)         Review of Systems     Review of Systems    A 10 point review of system was performed and was negative except as noted above in HPI    Physical Exam     Physical Exam  Vitals and nursing note reviewed. Constitutional:       General: She is in acute distress. Appearance: She is well-developed. She is not diaphoretic. Comments: Chronically ill-appearing  female  3 L nasal cannula   HENT:      Head: Normocephalic and atraumatic. Eyes:      Extraocular Movements: Extraocular movements intact. Conjunctiva/sclera: Conjunctivae normal.   Cardiovascular:      Rate and Rhythm: Normal rate and regular rhythm. Heart sounds: Normal heart sounds. Pulmonary:      Effort: Tachypnea present. Breath sounds: Wheezing present. Abdominal:      Palpations: Abdomen is soft. Tenderness: There is no abdominal tenderness. Musculoskeletal:      Cervical back: Neck supple. Right lower leg: No tenderness. No edema. Left lower leg: No tenderness. No edema. Neurological:      General: No focal deficit present. Mental Status: She is alert and oriented to person, place, and time. Lab and Diagnostic Study Results     Labs -     Recent Results (from the past 12 hour(s))   CBC WITH AUTOMATED DIFF    Collection Time: 12/26/22 10:04 PM   Result Value Ref Range    WBC 7.2 3.6 - 11.0 K/uL    RBC 4.64 3.80 - 5.20 M/uL    HGB 13.6 11.5 - 16.0 g/dL    HCT 41.5 35.0 - 47.0 %    MCV 89.4 80.0 - 99.0 FL    MCH 29.3 26.0 - 34.0 PG    MCHC 32.8 30.0 - 36.5 g/dL    RDW 15.0 (H) 11.5 - 14.5 %    PLATELET 428 (L) 452 - 400 K/uL    MPV 11.9 8.9 - 12.9 FL    NRBC 0.0 0.0  WBC    ABSOLUTE NRBC 0.00 0.00 - 0.01 K/uL    NEUTROPHILS 51 32 - 75 %    LYMPHOCYTES 37 12 - 49 %    MONOCYTES 10 5 - 13 %    EOSINOPHILS 1 0 - 7 %    BASOPHILS 1 0 - 1 %    IMMATURE GRANULOCYTES 0 0 - 0.5 %    ABS. NEUTROPHILS 3.7 1.8 - 8.0 K/UL    ABS. LYMPHOCYTES 2.6 0.8 - 3.5 K/UL    ABS. MONOCYTES 0.7 0.0 - 1.0 K/UL    ABS. EOSINOPHILS 0.1 0.0 - 0.4 K/UL    ABS.  BASOPHILS 0.1 0.0 - 0.1 K/UL ABS. IMM. GRANS. 0.0 0.00 - 0.04 K/UL    DF AUTOMATED     METABOLIC PANEL, COMPREHENSIVE    Collection Time: 12/26/22 10:04 PM   Result Value Ref Range    Sodium 142 136 - 145 mmol/L    Potassium 4.0 3.5 - 5.1 mmol/L    Chloride 106 97 - 108 mmol/L    CO2 27 21 - 32 mmol/L    Anion gap 9 5 - 15 mmol/L    Glucose 102 (H) 65 - 100 mg/dL    BUN 18 6 - 20 mg/dL    Creatinine 0.65 0.55 - 1.02 mg/dL    BUN/Creatinine ratio 28 (H) 12 - 20      eGFR >60 >60 ml/min/1.73m2    Calcium 9.6 8.5 - 10.1 mg/dL    Bilirubin, total 0.5 0.2 - 1.0 mg/dL    AST (SGOT) 36 15 - 37 U/L    ALT (SGPT) 40 12 - 78 U/L    Alk. phosphatase 60 45 - 117 U/L    Protein, total 6.9 6.4 - 8.2 g/dL    Albumin 3.7 3.5 - 5.0 g/dL    Globulin 3.2 2.0 - 4.0 g/dL    A-G Ratio 1.2 1.1 - 2.2         Radiologic Studies -   [unfilled]  CT Results  (Last 48 hours)      None          CXR Results  (Last 48 hours)                 12/26/22 2228  XR CHEST PORT Final result    Impression:  No acute process identified. Narrative:  Clinical history: Shortness of breath   INDICATION:   Shortness of breath   COMPARISON: 8/31/2022       FINDINGS:   AP portable upright view of the chest demonstrates a stable prominent   cardiopericardial silhouette. There is no pleural effusion. .There is no focal   consolidation. .There is no pneumothorax. . Patient is on a cardiac monitor. Medical Decision Making and ED Course   - I am the first and primary provider for this patient AND AM THE PRIMARY PROVIDER OF RECORD. - I reviewed the vital signs, available nursing notes, past medical history, past surgical history, family history and social history. - Initial assessment performed. The patients presenting problems have been discussed, and the staff are in agreement with the care plan formulated and outlined with them. I have encouraged them to ask questions as they arise throughout their visit.     Vital Signs-Reviewed the patient's vital signs. Patient Vitals for the past 24 hrs:   Temp Pulse Resp BP SpO2   12/27/22 0055 -- 72 16 122/75 92 %   12/26/22 2314 -- 74 13 115/62 99 %   12/26/22 2243 -- 72 20 117/87 96 %   12/26/22 2226 -- 65 -- -- --   12/26/22 2225 -- 65 -- -- 98 %   12/26/22 2159 98.4 °F (36.9 °C) -- -- -- --   12/26/22 2153 -- 67 24 (!) 96/46 96 %       Records Reviewed: Nursing Notes and Old Medical Records    Provider Notes (Medical Decision Making):     ED Course as of 12/27/22 0139   Mon Dec 26, 2022   2205 Patient is a 71-year-old female with history of COPD presents to the ED with shortness of breath. Her examination revealed an elderly lady connected nasal cannula speaking full sentences with exam showing diffuse wheezing. This is very likely secondary to COPD exacerbation. Additionally, patient reports that she is having cramping in her toes and fingers. We will get CBC, chemistry, chest x-ray, and give bronchodilators and prednisone and reassess. Other differential diagnoses aside from COPD are considered and does not appear to be likely. [AA]   2214 EKG was done at 9:15 PM interpreted by me as NSR rate of 69, intervals showed prolonged QRS but otherwise normal NJ and QTC, left axis deviation, LVH, no ST elevation or depression meeting criteria, no signs of dysrhythmia nor blocks. [AA]   2220 I did review and interpret the chest x-ray independently as normal x-ray. Pending radiology interpretation for confirmation. [AA]   2329 Examination revealed clear lung sounds bilaterally. Symptoms resolved. They reported cramps that the patient had has resolved as well. Pending ambulation trial for disposition. [AA]   Tue Dec 27, 2022   0135 On reassessment, patient is back to her baseline. No other complaints. Lung exam is clear. Speaking full sentences. Will discharge home with refill of her albuterol and DuoNeb.   Additionally will give a short course of steroids and she will follow-up with PMD. [AA]      ED Course User Index  [AA] Jairo Morley MD         There are no other complaint or new physical findings at this time. Procedures and Critical Care       Performed by: Irma Chester MD  PROCEDURES:  Critical Care  Performed by: Dontrell Garcia MD  Authorized by: Dontrell Garcia MD     Critical care provider statement:     Critical care time (minutes):  37    Critical care time was exclusive of:  Separately billable procedures and treating other patients    Critical care was necessary to treat or prevent imminent or life-threatening deterioration of the following conditions:  Respiratory failure    Critical care was time spent personally by me on the following activities:  Development of treatment plan with patient or surrogate, evaluation of patient's response to treatment, examination of patient, obtaining history from patient or surrogate, ordering and performing treatments and interventions, ordering and review of laboratory studies, ordering and review of radiographic studies, pulse oximetry, re-evaluation of patient's condition and review of old charts    I assumed direction of critical care for this patient from another provider in my specialty: no         Diagnosis     Clinical Impression:   1. COPD exacerbation ([facility] [address]          Disposition     Disposition: Condition resolved  DC- Adult Discharges: All of the diagnostic tests were reviewed and questions answered. Diagnosis, care plan and treatment options were discussed. The patient understands the instructions and will follow up as directed. The patients results have been reviewed with them. They have been counseled regarding their diagnosis. The patient and caregiver verbally convey understanding and agreement of the signs, symptoms, diagnosis, treatment and prognosis and additionally agrees to follow up as recommended with their PCP in 24 - 48 hours. They also agree with the care-plan and convey that all of their questions have been answered.   I have also put together some discharge instructions for them that include: 1) educational information regarding their diagnosis, 2) how to care for their diagnosis at home, as well a 3) list of reasons why they would want to return to the ED prior to their follow-up appointment, should their condition change. Discharged      DISCHARGE PLAN:  1. Follow-up Information       Follow up With Specialties Details Why [address]    [address] [facility] EMERGENCY DEPT Emergency Medicine Go to  As needed, If symptoms worsen [address] [address]  [facility], [address] [place] [address] Schedule an appointment as soon as possible for a visit in 3 days For reevaluation, Discuss your visit to the ER Lucy Mijessicaamirah Ever  [place] [address]  [phone]            2. Return to ED if worse   3. Current Discharge Medication List        START taking these medications    Details   predniSONE (DELTASONE) 20 mg tablet Take 2 Tablets by mouth daily for 4 days. With Breakfast  Qty: 8 Tablet, Refills: 0  Start date: 12/27/2022, End date: 12/31/2022           CONTINUE these medications which have CHANGED    Details   albuterol (PROVENTIL HFA, VENTOLIN HFA, PROAIR HFA) 90 mcg/actuation inhaler Take 2 Puffs by inhalation every six (6) hours as needed for Wheezing. Qty: 18 g, Refills: 0  Start date: 12/27/2022      albuterol-ipratropium (DUO-NEB) 2.5 mg-0.5 mg/3 ml nebu 3 mL by Nebulization route every six (6) hours as needed for Wheezing. Qty: 30 Each, Refills: 1  Start date: 12/27/2022               Attestations: Jeremie Buchanan MD    Please note that this dictation was completed with [employer], the [employer] voice recognition software. Quite often unanticipated grammatical, syntax, homophones, and other interpretive errors are inadvertently transcribed by the computer software. Please disregard these errors. Please excuse any errors that have escaped final proofreading. Thank you.

## 2022-12-27 NOTE — ED TRIAGE NOTES
Patient comes from EMS for shortness of breath and states that she is having spasms in her hands. Patient is on 3L NC at all times, hx of COPD, CVA, Anxiety. 7

## 2023-03-27 NOTE — PROGRESS NOTES
Visit Vitals  /72 (BP 1 Location: Left upper arm, BP Patient Position: Sitting, BP Cuff Size: Adult)   Pulse 75   Temp 98.3 °F (36.8 °C) (Temporal)   Resp 18   Ht 5' 7\" (1.702 m)   Wt 248 lb (112.5 kg)   SpO2 93%   BMI 38.84 kg/m²     Chief Complaint   Patient presents with    New Patient     nosebleeds yes

## 2023-04-21 DIAGNOSIS — Z12.31 VISIT FOR SCREENING MAMMOGRAM: Primary | ICD-10-CM

## 2023-08-09 ENCOUNTER — HOSPITAL ENCOUNTER (OUTPATIENT)
Facility: HOSPITAL | Age: 68
Discharge: HOME | End: 2023-08-12
Payer: MEDICARE

## 2023-08-09 VITALS — BODY MASS INDEX: 34.86 KG/M2 | HEIGHT: 68 IN | WEIGHT: 230 LBS

## 2023-08-09 DIAGNOSIS — Z78.0 ASYMPTOMATIC MENOPAUSAL STATE: ICD-10-CM

## 2023-08-09 PROCEDURE — 77080 DXA BONE DENSITY AXIAL: CPT

## 2023-08-11 ENCOUNTER — APPOINTMENT (OUTPATIENT)
Facility: HOSPITAL | Age: 68
End: 2023-08-11
Payer: MEDICARE

## 2023-08-11 ENCOUNTER — HOSPITAL ENCOUNTER (OUTPATIENT)
Facility: HOSPITAL | Age: 68
Setting detail: OBSERVATION
Discharge: HOME | End: 2023-08-12
Attending: STUDENT IN AN ORGANIZED HEALTH CARE EDUCATION/TRAINING PROGRAM | Admitting: INTERNAL MEDICINE
Payer: MEDICARE

## 2023-08-11 DIAGNOSIS — F41.9 ANXIETY: Primary | ICD-10-CM

## 2023-08-11 LAB
ALBUMIN SERPL-MCNC: 3.6 G/DL (ref 3.5–5)
ALBUMIN/GLOB SERPL: 0.9 (ref 1.1–2.2)
ALP SERPL-CCNC: 70 U/L (ref 45–117)
ALT SERPL-CCNC: 45 U/L (ref 12–78)
ANION GAP SERPL CALC-SCNC: 6 MMOL/L (ref 5–15)
APAP SERPL-MCNC: 32 UG/ML (ref 10–30)
AST SERPL W P-5'-P-CCNC: 39 U/L (ref 15–37)
BASOPHILS # BLD: 0.1 K/UL (ref 0–0.1)
BASOPHILS NFR BLD: 1 % (ref 0–1)
BILIRUB SERPL-MCNC: 0.4 MG/DL (ref 0.2–1)
BUN SERPL-MCNC: 15 MG/DL (ref 6–20)
BUN/CREAT SERPL: 20 (ref 12–20)
CA-I BLD-MCNC: 9.2 MG/DL (ref 8.5–10.1)
CHLORIDE SERPL-SCNC: 105 MMOL/L (ref 97–108)
CO2 SERPL-SCNC: 29 MMOL/L (ref 21–32)
CREAT SERPL-MCNC: 0.74 MG/DL (ref 0.55–1.02)
DIFFERENTIAL METHOD BLD: ABNORMAL
EOSINOPHIL # BLD: 0.2 K/UL (ref 0–0.4)
EOSINOPHIL NFR BLD: 2 % (ref 0–7)
ERYTHROCYTE [DISTWIDTH] IN BLOOD BY AUTOMATED COUNT: 14.6 % (ref 11.5–14.5)
GLOBULIN SER CALC-MCNC: 3.8 G/DL (ref 2–4)
GLUCOSE SERPL-MCNC: 121 MG/DL (ref 65–100)
HCT VFR BLD AUTO: 40.6 % (ref 35–47)
HGB BLD-MCNC: 12.9 G/DL (ref 11.5–16)
IMM GRANULOCYTES # BLD AUTO: 0 K/UL (ref 0–0.04)
IMM GRANULOCYTES NFR BLD AUTO: 0 % (ref 0–0.5)
LYMPHOCYTES # BLD: 2 K/UL (ref 0.8–3.5)
LYMPHOCYTES NFR BLD: 27 % (ref 12–49)
MCH RBC QN AUTO: 28.4 PG (ref 26–34)
MCHC RBC AUTO-ENTMCNC: 31.8 G/DL (ref 30–36.5)
MCV RBC AUTO: 89.4 FL (ref 80–99)
MONOCYTES # BLD: 0.7 K/UL (ref 0–1)
MONOCYTES NFR BLD: 10 % (ref 5–13)
NEUTS SEG # BLD: 4.6 K/UL (ref 1.8–8)
NEUTS SEG NFR BLD: 60 % (ref 32–75)
NRBC # BLD: 0 K/UL (ref 0–0.01)
NRBC BLD-RTO: 0 PER 100 WBC
PLATELET # BLD AUTO: 153 K/UL (ref 150–400)
PMV BLD AUTO: 11.8 FL (ref 8.9–12.9)
POTASSIUM SERPL-SCNC: 4 MMOL/L (ref 3.5–5.1)
PROT SERPL-MCNC: 7.4 G/DL (ref 6.4–8.2)
RBC # BLD AUTO: 4.54 M/UL (ref 3.8–5.2)
SALICYLATES SERPL-MCNC: <1.7 MG/DL (ref 2.8–20)
SODIUM SERPL-SCNC: 140 MMOL/L (ref 136–145)
WBC # BLD AUTO: 7.6 K/UL (ref 3.6–11)

## 2023-08-11 PROCEDURE — 2580000003 HC RX 258: Performed by: STUDENT IN AN ORGANIZED HEALTH CARE EDUCATION/TRAINING PROGRAM

## 2023-08-11 PROCEDURE — 36415 COLL VENOUS BLD VENIPUNCTURE: CPT

## 2023-08-11 PROCEDURE — 96361 HYDRATE IV INFUSION ADD-ON: CPT

## 2023-08-11 PROCEDURE — 80143 DRUG ASSAY ACETAMINOPHEN: CPT

## 2023-08-11 PROCEDURE — 96360 HYDRATION IV INFUSION INIT: CPT

## 2023-08-11 PROCEDURE — 93005 ELECTROCARDIOGRAM TRACING: CPT | Performed by: STUDENT IN AN ORGANIZED HEALTH CARE EDUCATION/TRAINING PROGRAM

## 2023-08-11 PROCEDURE — 99285 EMERGENCY DEPT VISIT HI MDM: CPT

## 2023-08-11 PROCEDURE — 80179 DRUG ASSAY SALICYLATE: CPT

## 2023-08-11 PROCEDURE — 85025 COMPLETE CBC W/AUTO DIFF WBC: CPT

## 2023-08-11 PROCEDURE — 80053 COMPREHEN METABOLIC PANEL: CPT

## 2023-08-11 PROCEDURE — 71045 X-RAY EXAM CHEST 1 VIEW: CPT

## 2023-08-11 RX ORDER — 0.9 % SODIUM CHLORIDE 0.9 %
1000 INTRAVENOUS SOLUTION INTRAVENOUS ONCE
Status: COMPLETED | OUTPATIENT
Start: 2023-08-11 | End: 2023-08-12

## 2023-08-11 RX ADMIN — SODIUM CHLORIDE 1000 ML: 9 INJECTION, SOLUTION INTRAVENOUS at 22:16

## 2023-08-11 RX ADMIN — SODIUM CHLORIDE 1000 ML: 9 INJECTION, SOLUTION INTRAVENOUS at 23:47

## 2023-08-11 ASSESSMENT — PAIN - FUNCTIONAL ASSESSMENT: PAIN_FUNCTIONAL_ASSESSMENT: NONE - DENIES PAIN

## 2023-08-11 NOTE — ED TRIAGE NOTES
Patient's daughter states patient took multiple oxycodone that was prescribed to her for a recent dental procedure. Daughter states the patient told her she took 8 of her oxycodone but patient keeps changing her answer of how many she took. Daughter states patient was difficult to arouse and drowsy initially. Unable to find the bottle of pills at home. On initial triage patient is 83% on her home 3L. Patient denies SI, states it was an accident.

## 2023-08-12 VITALS
HEART RATE: 61 BPM | OXYGEN SATURATION: 98 % | WEIGHT: 230 LBS | TEMPERATURE: 98.6 F | HEIGHT: 68 IN | SYSTOLIC BLOOD PRESSURE: 126 MMHG | DIASTOLIC BLOOD PRESSURE: 81 MMHG | RESPIRATION RATE: 18 BRPM | BODY MASS INDEX: 34.86 KG/M2

## 2023-08-12 PROBLEM — T44.901A: Status: ACTIVE | Noted: 2023-08-12

## 2023-08-12 LAB
AMPHET UR QL SCN: NEGATIVE
APPEARANCE UR: CLEAR
BACTERIA URNS QL MICRO: NEGATIVE /HPF
BARBITURATES UR QL SCN: NEGATIVE
BENZODIAZ UR QL: NEGATIVE
BILIRUB UR QL: NEGATIVE
CANNABINOIDS UR QL SCN: NEGATIVE
COCAINE UR QL SCN: NEGATIVE
COLOR UR: NORMAL
EKG ATRIAL RATE: 97 BPM
EKG DIAGNOSIS: NORMAL
EKG P AXIS: 36 DEGREES
EKG P-R INTERVAL: 176 MS
EKG Q-T INTERVAL: 374 MS
EKG QRS DURATION: 126 MS
EKG QTC CALCULATION (BAZETT): 474 MS
EKG R AXIS: -51 DEGREES
EKG T AXIS: 46 DEGREES
EKG VENTRICULAR RATE: 97 BPM
GLUCOSE UR STRIP.AUTO-MCNC: NEGATIVE MG/DL
HGB UR QL STRIP: NEGATIVE
KETONES UR QL STRIP.AUTO: NEGATIVE MG/DL
LEUKOCYTE ESTERASE UR QL STRIP.AUTO: NEGATIVE
Lab: ABNORMAL
METHADONE UR QL: NEGATIVE
NITRITE UR QL STRIP.AUTO: NEGATIVE
OPIATES UR QL: POSITIVE
PCP UR QL: NEGATIVE
PH UR STRIP: 5 (ref 5–8)
PROT UR STRIP-MCNC: NEGATIVE MG/DL
RBC #/AREA URNS HPF: NORMAL /HPF (ref 0–5)
SP GR UR REFRACTOMETRY: 1.01 (ref 1–1.03)
URINE CULTURE IF INDICATED: NORMAL
UROBILINOGEN UR QL STRIP.AUTO: 0.1 EU/DL (ref 0.1–1)
WBC URNS QL MICRO: NORMAL /HPF (ref 0–4)

## 2023-08-12 PROCEDURE — 94761 N-INVAS EAR/PLS OXIMETRY MLT: CPT

## 2023-08-12 PROCEDURE — G0378 HOSPITAL OBSERVATION PER HR: HCPCS

## 2023-08-12 PROCEDURE — 2580000003 HC RX 258: Performed by: INTERNAL MEDICINE

## 2023-08-12 PROCEDURE — 81001 URINALYSIS AUTO W/SCOPE: CPT

## 2023-08-12 PROCEDURE — 2700000000 HC OXYGEN THERAPY PER DAY

## 2023-08-12 PROCEDURE — 96361 HYDRATE IV INFUSION ADD-ON: CPT

## 2023-08-12 PROCEDURE — 80307 DRUG TEST PRSMV CHEM ANLYZR: CPT

## 2023-08-12 PROCEDURE — 6370000000 HC RX 637 (ALT 250 FOR IP): Performed by: INTERNAL MEDICINE

## 2023-08-12 PROCEDURE — 94640 AIRWAY INHALATION TREATMENT: CPT

## 2023-08-12 RX ORDER — SODIUM CHLORIDE 9 MG/ML
INJECTION, SOLUTION INTRAVENOUS PRN
Status: DISCONTINUED | OUTPATIENT
Start: 2023-08-12 | End: 2023-08-12 | Stop reason: HOSPADM

## 2023-08-12 RX ORDER — ATORVASTATIN CALCIUM 40 MG/1
40 TABLET, FILM COATED ORAL DAILY
Status: DISCONTINUED | OUTPATIENT
Start: 2023-08-12 | End: 2023-08-12 | Stop reason: HOSPADM

## 2023-08-12 RX ORDER — LANOLIN ALCOHOL/MO/W.PET/CERES
5000 CREAM (GRAM) TOPICAL DAILY
Status: DISCONTINUED | OUTPATIENT
Start: 2023-08-12 | End: 2023-08-12

## 2023-08-12 RX ORDER — ACETAMINOPHEN 500 MG
500 TABLET ORAL PRN
Qty: 120 TABLET | Refills: 3 | Status: SHIPPED | OUTPATIENT
Start: 2023-08-12

## 2023-08-12 RX ORDER — VITAMIN B COMPLEX
1000 TABLET ORAL DAILY
Status: DISCONTINUED | OUTPATIENT
Start: 2023-08-12 | End: 2023-08-12 | Stop reason: HOSPADM

## 2023-08-12 RX ORDER — ONDANSETRON 4 MG/1
4 TABLET, ORALLY DISINTEGRATING ORAL EVERY 8 HOURS PRN
Status: DISCONTINUED | OUTPATIENT
Start: 2023-08-12 | End: 2023-08-12 | Stop reason: HOSPADM

## 2023-08-12 RX ORDER — PANTOPRAZOLE SODIUM 40 MG/1
40 TABLET, DELAYED RELEASE ORAL
Status: DISCONTINUED | OUTPATIENT
Start: 2023-08-12 | End: 2023-08-12 | Stop reason: HOSPADM

## 2023-08-12 RX ORDER — ONDANSETRON 2 MG/ML
4 INJECTION INTRAMUSCULAR; INTRAVENOUS EVERY 6 HOURS PRN
Status: DISCONTINUED | OUTPATIENT
Start: 2023-08-12 | End: 2023-08-12 | Stop reason: HOSPADM

## 2023-08-12 RX ORDER — IPRATROPIUM BROMIDE AND ALBUTEROL SULFATE 2.5; .5 MG/3ML; MG/3ML
1 SOLUTION RESPIRATORY (INHALATION) EVERY 6 HOURS PRN
Status: DISCONTINUED | OUTPATIENT
Start: 2023-08-12 | End: 2023-08-12 | Stop reason: HOSPADM

## 2023-08-12 RX ORDER — LEVOTHYROXINE SODIUM 0.03 MG/1
75 TABLET ORAL
Status: DISCONTINUED | OUTPATIENT
Start: 2023-08-12 | End: 2023-08-12 | Stop reason: HOSPADM

## 2023-08-12 RX ORDER — ALBUTEROL SULFATE 90 UG/1
2 AEROSOL, METERED RESPIRATORY (INHALATION) EVERY 6 HOURS PRN
Status: DISCONTINUED | OUTPATIENT
Start: 2023-08-12 | End: 2023-08-12 | Stop reason: HOSPADM

## 2023-08-12 RX ORDER — SODIUM CHLORIDE 0.9 % (FLUSH) 0.9 %
5-40 SYRINGE (ML) INJECTION PRN
Status: DISCONTINUED | OUTPATIENT
Start: 2023-08-12 | End: 2023-08-12 | Stop reason: HOSPADM

## 2023-08-12 RX ORDER — PRAMIPEXOLE DIHYDROCHLORIDE 0.12 MG/1
0.25 TABLET ORAL NIGHTLY
Status: DISCONTINUED | OUTPATIENT
Start: 2023-08-12 | End: 2023-08-12 | Stop reason: HOSPADM

## 2023-08-12 RX ORDER — POLYETHYLENE GLYCOL 3350 17 G/17G
17 POWDER, FOR SOLUTION ORAL DAILY PRN
Status: DISCONTINUED | OUTPATIENT
Start: 2023-08-12 | End: 2023-08-12 | Stop reason: HOSPADM

## 2023-08-12 RX ORDER — MONTELUKAST SODIUM 10 MG/1
10 TABLET ORAL DAILY
Status: DISCONTINUED | OUTPATIENT
Start: 2023-08-12 | End: 2023-08-12 | Stop reason: HOSPADM

## 2023-08-12 RX ORDER — ASPIRIN 81 MG/1
81 TABLET ORAL DAILY
Status: DISCONTINUED | OUTPATIENT
Start: 2023-08-12 | End: 2023-08-12 | Stop reason: HOSPADM

## 2023-08-12 RX ORDER — FUROSEMIDE 20 MG/1
20 TABLET ORAL 2 TIMES DAILY
Qty: 60 TABLET | Refills: 0 | Status: SHIPPED | OUTPATIENT
Start: 2023-08-12

## 2023-08-12 RX ORDER — CLONAZEPAM 0.5 MG/1
0.5 TABLET ORAL NIGHTLY PRN
Qty: 5 TABLET | Refills: 0 | Status: SHIPPED | OUTPATIENT
Start: 2023-08-12 | End: 2023-08-17

## 2023-08-12 RX ORDER — LANOLIN ALCOHOL/MO/W.PET/CERES
2500 CREAM (GRAM) TOPICAL DAILY
Status: DISCONTINUED | OUTPATIENT
Start: 2023-08-12 | End: 2023-08-12 | Stop reason: HOSPADM

## 2023-08-12 RX ORDER — ACETAMINOPHEN 325 MG/1
650 TABLET ORAL EVERY 6 HOURS PRN
Status: DISCONTINUED | OUTPATIENT
Start: 2023-08-12 | End: 2023-08-12 | Stop reason: HOSPADM

## 2023-08-12 RX ORDER — SODIUM CHLORIDE, SODIUM LACTATE, POTASSIUM CHLORIDE, CALCIUM CHLORIDE 600; 310; 30; 20 MG/100ML; MG/100ML; MG/100ML; MG/100ML
INJECTION, SOLUTION INTRAVENOUS CONTINUOUS
Status: DISPENSED | OUTPATIENT
Start: 2023-08-12 | End: 2023-08-12

## 2023-08-12 RX ORDER — DULOXETIN HYDROCHLORIDE 30 MG/1
30 CAPSULE, DELAYED RELEASE ORAL 2 TIMES DAILY
Status: DISCONTINUED | OUTPATIENT
Start: 2023-08-12 | End: 2023-08-12 | Stop reason: HOSPADM

## 2023-08-12 RX ORDER — SODIUM CHLORIDE 0.9 % (FLUSH) 0.9 %
5-40 SYRINGE (ML) INJECTION EVERY 12 HOURS SCHEDULED
Status: DISCONTINUED | OUTPATIENT
Start: 2023-08-12 | End: 2023-08-12 | Stop reason: HOSPADM

## 2023-08-12 RX ORDER — ACETAMINOPHEN 650 MG/1
650 SUPPOSITORY RECTAL EVERY 6 HOURS PRN
Status: DISCONTINUED | OUTPATIENT
Start: 2023-08-12 | End: 2023-08-12 | Stop reason: HOSPADM

## 2023-08-12 RX ADMIN — MONTELUKAST 10 MG: 10 TABLET, FILM COATED ORAL at 08:49

## 2023-08-12 RX ADMIN — RIVAROXABAN 10 MG: 10 TABLET, FILM COATED ORAL at 08:49

## 2023-08-12 RX ADMIN — Medication 1000 UNITS: at 08:49

## 2023-08-12 RX ADMIN — SODIUM CHLORIDE, POTASSIUM CHLORIDE, SODIUM LACTATE AND CALCIUM CHLORIDE: 600; 310; 30; 20 INJECTION, SOLUTION INTRAVENOUS at 06:21

## 2023-08-12 RX ADMIN — LEVOTHYROXINE SODIUM 75 MCG: 0.03 TABLET ORAL at 08:49

## 2023-08-12 RX ADMIN — ASPIRIN 81 MG: 81 TABLET, COATED ORAL at 08:49

## 2023-08-12 RX ADMIN — ATORVASTATIN CALCIUM 40 MG: 40 TABLET, FILM COATED ORAL at 08:49

## 2023-08-12 RX ADMIN — TIOTROPIUM BROMIDE INHALATION SPRAY 2 PUFF: 3.12 SPRAY, METERED RESPIRATORY (INHALATION) at 08:49

## 2023-08-12 RX ADMIN — DULOXETINE HYDROCHLORIDE 30 MG: 30 CAPSULE, DELAYED RELEASE ORAL at 08:48

## 2023-08-12 RX ADMIN — PANTOPRAZOLE SODIUM 40 MG: 40 TABLET, DELAYED RELEASE ORAL at 08:49

## 2023-08-12 RX ADMIN — CYANOCOBALAMIN TAB 1000 MCG 2500 MCG: 1000 TAB at 10:42

## 2023-08-12 RX ADMIN — SODIUM CHLORIDE, PRESERVATIVE FREE 10 ML: 5 INJECTION INTRAVENOUS at 08:50

## 2023-08-12 NOTE — ED PROVIDER NOTES
[facility] EMERGENCY DEPT  EMERGENCY DEPARTMENT HISTORY AND PHYSICAL EXAM      Date: 2023  Patient Name: Cherrie Carranza  MRN: [id number]  [address] [place] 1955  Date of evaluation: 2023  Provider: Christopher Tesfaye MD   Note Started: 11:46 PM EDT 23    HISTORY OF PRESENT ILLNESS     Chief Complaint   Patient presents with    Drug Overdose       History Provided By: Patient    HPI: Cherrie Carranza is a 74 y.o. female history of stroke, short-term memory loss, COPD on 3 L oxygen at home baseline, presents after opioid overdose that was accidental.  Patient recently had dental surgery and was prescribed Percocets. Her family noted that she seemed very tired and almost unresponsive at home. They realized that she taken too many of her pills, the patient for says she took 3, then she says she took 10, therefore they are unsure. When asked now, the patient does not recall. She has no complaints. She denies any chest pain, dyspnea, lightheadedness. When asked multiple times, she is adamant that she did not intentionally overdose and that this was an accident. He has no thoughts about hurting herself. PAST MEDICAL HISTORY   Past Medical History:  Past Medical History:   Diagnosis Date    Breast cancer ([address]) 2015    Right    Chronic obstructive pulmonary disease ([address])     Stroke (cerebrum) ([address])        Past Surgical History:  Past Surgical History:   Procedure Laterality Date    BREAST LUMPECTOMY Right 2015    CARDIAC CATHETERIZATION         Family History:  Family History   Problem Relation Age of Onset    Breast Cancer Sister        Social History:  Social History     Tobacco Use    Smoking status: Former     Packs/day: 1.50     Types: Cigarettes     Quit date: 2016     Years since quittin.6    Smokeless tobacco: Never   Substance Use Topics    Alcohol use: Not Currently    Drug use: Never       Allergies:   Allergies   Allergen Reactions    Codeine      Other reaction(s): Unknown other interpretive errors are inadvertently transcribed by the computer software. Please disregards these errors.  Please excuse any errors that have escaped final proofreading.)     Scottie Raman MD  08/12/23 4974

## 2023-08-12 NOTE — PLAN OF CARE
Problem: Discharge Planning  Goal: Discharge to home or other facility with appropriate resources  8/12/2023 0612 by Beverly Mccray RN  Outcome: Progressing  8/12/2023 0557 by Beverly Mccray RN  Outcome: Progressing     Problem: Skin/Tissue Integrity  Goal: Absence of new skin breakdown  Description: 1. Monitor for areas of redness and/or skin breakdown  2. Assess vascular access sites hourly  3. Every 4-6 hours minimum:  Change oxygen saturation probe site  4. Every 4-6 hours:  If on nasal continuous positive airway pressure, respiratory therapy assess nares and determine need for appliance change or resting period.   8/12/2023 0612 by Beverly Mccray RN  Outcome: Progressing  8/12/2023 0557 by Beverly Mccray RN  Outcome: Progressing

## 2023-08-12 NOTE — PLAN OF CARE
Problem: Discharge Planning  Goal: Discharge to home or other facility with appropriate resources  8/12/2023 1436 by Fiona Menendez RN  Outcome: Completed  8/12/2023 0612 by Chel Kidd RN  Outcome: Progressing  8/12/2023 0557 by Chel Kidd RN  Outcome: Progressing     Problem: Skin/Tissue Integrity  Goal: Absence of new skin breakdown  Description: 1. Monitor for areas of redness and/or skin breakdown  2. Assess vascular access sites hourly  3. Every 4-6 hours minimum:  Change oxygen saturation probe site  4. Every 4-6 hours:  If on nasal continuous positive airway pressure, respiratory therapy assess nares and determine need for appliance change or resting period.   8/12/2023 1436 by Fiona Menendez RN  Outcome: Completed  8/12/2023 0612 by Chel Kidd RN  Outcome: Progressing  8/12/2023 0557 by Chel Kidd RN  Outcome: Progressing

## 2023-08-12 NOTE — DISCHARGE SUMMARY
Hospitalist Discharge Summary     Patient ID:    Mellisa Liu  [id number]  74 y.o.  1955    Admit date: 8/11/2023    Discharge date : 8/12/2023      Final Diagnoses:   Principal Problem:    Unintentional poisoning by drug primarily affecting autonomic nervous system, initial encounter  Resolved Problems:    * No resolved hospital problems. *      Reason for Hospitalization/Hospital Course:   Chief complain on admission: Katiuska Rajput is a 74 y.o. female with PMH of CVA, COPD on home 3.5L NC and other medical problems as below. Presented to the ED with chief complaint of altered mental status. Family member noted that she appears fatigued and unresponsive at home today evening and was told that she took too many of her pain medications. She was prescribed percocet for a recent dental surgery. Patient does not remember how many pills she took. Otherwise no active complaints. Reports unintentional overdose with no suicidal ideation. In the ED, noted hypotensive and hypoxic requiring 6L NC. Chest X-ray no acute finding. On my evaluation, she is A&Ox3, much improve from presentation. However BP still soft after 2L NS.     8/12/23: Patient is clinically stable for discharge. She alert and oriented x 3. She is hemodynamically stable. Please take opioid medications only as prescribed. Continue home O2 and nebulizer treatments and inhalers at home. Please follow-up with primary care as needed.      Discharge Medications:      Medication List        CONTINUE taking these medications      acetaminophen 500 MG tablet  Commonly known as: TYLENOL     albuterol sulfate  (90 Base) MCG/ACT inhaler  Commonly known as: PROVENTIL;VENTOLIN;PROAIR     aspirin 81 MG EC tablet     atorvastatin 40 MG tablet  Commonly known as: LIPITOR     cyanocobalamin 1000 MCG tablet     DULoxetine 30 MG extended release capsule  Commonly known as: Clear      Specific [place], UA 1.011 1.003 - 1.030      pH, Urine 5.0 5.0 - 8.0      Protein, UA Negative Negative mg/dL    Glucose, UA Negative Negative mg/dL    Ketones, Urine Negative Negative mg/dL    Bilirubin Urine Negative Negative      Blood, Urine Negative Negative      Urobilinogen, Urine 0.1 0.1 - 1.0 EU/dL    Nitrite, Urine Negative Negative      Leukocyte Esterase, Urine Negative Negative      WBC, UA 0-4 0 - 4 /hpf    RBC, UA 0-5 0 - 5 /hpf    BACTERIA, URINE Negative Negative /hpf    Urine Culture if Indicated Culture not indicated by UA result Culture not indicated by UA result     Urine Drug Screen    Collection Time: 08/12/23 12:26 AM   Result Value Ref Range    Amphetamine, Urine Negative Negative      Barbiturates, Urine Negative Negative      Benzodiazepines, Urine Negative Negative      Cocaine, Urine Negative Negative      Methadone, Urine Negative Negative      Opiates, Urine Positive (A) Negative      PCP, Urine Negative Negative      THC, TH-Cannabinol, Urine Negative Negative      Comments: This test is a screen for drugs of abuse in a medical setting only (i.e., they are unconfirmed results and as such must not be used for non-medical purposes, e.g.,employment testing, legal testing). Due to its inherent nature, false positive (FP) and false negative (FN) results may be obtained. Therefore, if necessary for medical care, recommend confirmation of positive findings by GC/MS. XR CHEST PORTABLE   Final Result      No acute process on portable chest.             Time spent in direct and indirect care including coordination of services: 35 minutes    Signed:   THAI Ramírez NP  8/12/2023  1:42 PM

## 2023-08-12 NOTE — H&P
History and Physical    Patient: Argelia Reid MRN: [id number]  SSN: [id number]    YOB: 1955  Age: 74 y.o. Sex: female      Subjective:      Argelia Reid is a 74 y.o. female with PMH of CVA, COPD on home 3.5L NC  and other medical problems as below. Presented to the ED with chief complaint of altered mental status. Family member noted that she appears fatigued and unresponsive at home today evening and was told that she took too many of her pain medications. She was prescribed percocet for a recent dental surgery. Patient does not remember how many pills she took. Otherwise no active complaints. Reports unintentional overdose with no suicidal ideation. In the ED, noted hypotensive and hypoxic requiring 6L NC. Chest X-ray no acute finding. On my evaluation, she is A&Ox3, much improve from presentation. However BP still soft after 2L NS. Case discussed with ED physician and agree that patient require inpatient admission/ observation for further management. Chart review: none     Past Medical History:   Diagnosis Date    Breast cancer ([address] 2015    Right    Chronic obstructive pulmonary disease ([address])     Stroke (cerebrum) ([address]) 2015     Family History   Problem Relation Age of Onset    Breast Cancer Sister      Social History     Tobacco Use    Smoking status: Former     Packs/day: 1.50     Types: Cigarettes     Quit date: 2016     Years since quittin.6    Smokeless tobacco: Never   Substance Use Topics    Alcohol use: Not Currently        Objective:     Physical Exam:   General: alert, cooperative, no distress  Eye: conjunctivae/corneas clear. PERRL, EOM's intact. Throat and Neck: normal and no erythema or exudates noted. No mass   Lung: clear to auscultation bilaterally  Heart: regular rate and rhythm,   Abdomen: soft, non-tender. Bowel sounds normal. No masses,  Extremities: 1+ LE edema.  able to move all extremities normal, atraumatic  Skin: Normal.  Neurologic:

## 2023-08-12 NOTE — CARE COORDINATION
08/12/23 1626   Service Assessment   Patient Orientation Alert and Oriented   Cognition Alert   History Provided By Patient   Primary [address]   PCP Verified by CM Yes   Last Visit to PCP Within last 3 months   Prior Functional Level Independent in ADLs/IADLs   Current Functional Level Independent in ADLs/IADLs   Can patient return to prior living arrangement Yes   Ability to make needs known: Good   Family able to assist with home care needs: Yes   Would you like for me to discuss the discharge plan with any other family members/significant others, and if so, who?  Yes  (daughter)   Financial Resources Medicare   Social/Functional History   Lives With Family   Discharge Planning   Current Services Prior To Admission Oxygen Therapy     Patient receives home O2 from [facility].

## 2023-09-13 ENCOUNTER — OFFICE VISIT (OUTPATIENT)
Age: 68
End: 2023-09-13

## 2023-09-13 VITALS
HEIGHT: 68 IN | OXYGEN SATURATION: 94 % | HEART RATE: 84 BPM | DIASTOLIC BLOOD PRESSURE: 80 MMHG | WEIGHT: 230 LBS | SYSTOLIC BLOOD PRESSURE: 124 MMHG | BODY MASS INDEX: 34.86 KG/M2 | RESPIRATION RATE: 18 BRPM

## 2023-09-13 DIAGNOSIS — Z99.81 DEPENDENCE ON SUPPLEMENTAL OXYGEN: Primary | ICD-10-CM

## 2023-09-13 DIAGNOSIS — J34.89 NASAL SEPTUM PERFORATION: ICD-10-CM

## 2023-09-13 DIAGNOSIS — J34.89 NASAL VESTIBULITIS: ICD-10-CM

## 2023-09-13 NOTE — PROGRESS NOTES
Subjective:    Henry Padron   61 y.o.   1955     Followup Visit    Location -nose    Quality -epistaxis    Severity -severe    Duration -10 days    Timing -intermittent    Context -patient following up from hospitalization she was discharged on 1 week ago. She had left nasal packing done was hospitalized due to blood loss and oxygen requirements. Since discharge her Xarelto has been cut down to 10 mg. She has not had bleeding since being home. She is using humidified oxygen and using saline. She admits to manipulating the left nostril with her finger    Modifying Features -none    Associated symptoms/signs -none    11/22/2021 -1 month follow-up, she is overall doing better. No further bleeding from the nose, continues on her routine for nasal moisturization and remains on a lower dose of the Xarelto. She states that the left side is feeling more blocked than usual.    2/23/22 - 3 mos f/u - still having some nasal pain left side, no bleeding; she had Covid last month had to use a full face mask for her O2; now back on nasal O2    8/3/2022 -6-month follow-up today, had nasal packing placed for epistaxis 2 days ago. States that she was applying Neosporin ointment to the left nose with a Q-tip when it started. She remains on her Xarelto. Has not held it    12/13/2022  4-month follow-up. She has not had much bleeding since the last visit but few weeks ago she felt as though she may have developed a perforation and is complaining of some more increased sensitivity particularly in the left nostril. 9/13/2023  Follows up today -has been complaining of a burning sensation in the nose when using her nasal oxygen. She has been switched over to a facemask for the time being. She has been using the saline and gel to keep the nose moistened. Review of Systems  Review of Systems   Constitutional:  Negative for chills and fever. HENT:  Positive for nosebleeds.  Negative for ear pain, hearing loss

## 2024-01-05 NOTE — Clinical Note
[address] EMERGENCY DEPARTMENT  [address] [facility] [place] [address]  [phone]    Work/School Note    Date: 8/1/2022    To Whom It May concern:    Ella Delong was seen and treated today in the emergency room by the following provider(s):  Attending Provider: Arnel Perea MD.      Ella Delong is excused from work/school on 08/01/22 and 08/02/22. She is medically clear to return to work/school on 8/3/2022.        Sincerely,          Brice Schultz MD
Yes

## 2024-06-25 ENCOUNTER — HOSPITAL ENCOUNTER (OUTPATIENT)
Facility: HOSPITAL | Age: 69
Discharge: HOME | End: 2024-06-27
Payer: MEDICARE

## 2024-06-25 DIAGNOSIS — R60.0 LOWER LEG EDEMA: ICD-10-CM

## 2024-06-25 LAB
ECHO AO ASC DIAM: 3.4 CM
ECHO AO ROOT DIAM: 2.9 CM
ECHO AV AREA PEAK VELOCITY: 2.6 CM2
ECHO AV AREA VTI: 2.5 CM2
ECHO AV MEAN GRADIENT: 4 MMHG
ECHO AV MEAN VELOCITY: 1 M/S
ECHO AV PEAK GRADIENT: 6 MMHG
ECHO AV PEAK VELOCITY: 1.2 M/S
ECHO AV VELOCITY RATIO: 0.83
ECHO AV VTI: 25.6 CM
ECHO EST RA PRESSURE: 3 MMHG
ECHO LA AREA 4C: 16.8 CM2
ECHO LA DIAMETER: 3.7 CM
ECHO LA MAJOR AXIS: 5.5 CM
ECHO LA TO AORTIC ROOT RATIO: 1.28
ECHO LA VOL MOD A4C: 40 ML (ref 22–52)
ECHO LV E' LATERAL VELOCITY: 6 CM/S
ECHO LV E' SEPTAL VELOCITY: 7 CM/S
ECHO LV EDV A4C: 136 ML
ECHO LV EJECTION FRACTION A4C: 41 %
ECHO LV ESV A4C: 80 ML
ECHO LV FRACTIONAL SHORTENING: 43 % (ref 28–44)
ECHO LV INTERNAL DIMENSION DIASTOLIC: 5.3 CM (ref 3.9–5.3)
ECHO LV INTERNAL DIMENSION SYSTOLIC: 3 CM
ECHO LV IVSD: 1.4 CM (ref 0.6–0.9)
ECHO LV MASS 2D: 352.5 G (ref 67–162)
ECHO LV POSTERIOR WALL DIASTOLIC: 1.6 CM (ref 0.6–0.9)
ECHO LV RELATIVE WALL THICKNESS RATIO: 0.6
ECHO LVOT AREA: 3.1 CM2
ECHO LVOT AV VTI INDEX: 0.8
ECHO LVOT DIAM: 2 CM
ECHO LVOT MEAN GRADIENT: 3 MMHG
ECHO LVOT PEAK GRADIENT: 4 MMHG
ECHO LVOT PEAK VELOCITY: 1 M/S
ECHO LVOT SV: 64.1 ML
ECHO LVOT VTI: 20.4 CM
ECHO MV A VELOCITY: 0.88 M/S
ECHO MV AREA VTI: 3.5 CM2
ECHO MV E DECELERATION TIME (DT): 336 MS
ECHO MV E VELOCITY: 0.56 M/S
ECHO MV E/A RATIO: 0.64
ECHO MV E/E' LATERAL: 9.33
ECHO MV E/E' RATIO (AVERAGED): 8.67
ECHO MV E/E' SEPTAL: 8
ECHO MV LVOT VTI INDEX: 0.9
ECHO MV MAX VELOCITY: 1 M/S
ECHO MV MEAN GRADIENT: 2 MMHG
ECHO MV MEAN VELOCITY: 0.7 M/S
ECHO MV PEAK GRADIENT: 4 MMHG
ECHO MV REGURGITANT PEAK GRADIENT: 8 MMHG
ECHO MV REGURGITANT PEAK VELOCITY: 1.4 M/S
ECHO MV VTI: 18.4 CM
ECHO PV MAX VELOCITY: 1 M/S
ECHO PV PEAK GRADIENT: 4 MMHG
ECHO RA AREA 4C: 12.1 CM2
ECHO RA VOLUME: 23 ML
ECHO RIGHT VENTRICULAR SYSTOLIC PRESSURE (RVSP): 15 MMHG
ECHO RV BASAL DIMENSION: 3.6 CM
ECHO RV FREE WALL PEAK S': 13 CM/S
ECHO RV TAPSE: 1.9 CM (ref 1.7–?)
ECHO TV REGURGITANT MAX VELOCITY: 1.72 M/S
ECHO TV REGURGITANT PEAK GRADIENT: 12 MMHG

## 2024-06-25 PROCEDURE — 93306 TTE W/DOPPLER COMPLETE: CPT

## 2024-08-01 ENCOUNTER — TELEPHONE (OUTPATIENT)
Age: 69
End: 2024-08-01

## 2024-08-01 NOTE — TELEPHONE ENCOUNTER
Pt daughter in law called stating that her nose is raw from her oxygen. She explained that's its very moist and the medication she was prescribed is not helping. She asked would we be able to send something in or does she need to come in and see Dr. Washington? Please advise

## 2024-11-12 ENCOUNTER — TRANSCRIBE ORDERS (OUTPATIENT)
Facility: HOSPITAL | Age: 69
End: 2024-11-12

## 2024-11-12 DIAGNOSIS — M81.0 AGE-RELATED OSTEOPOROSIS WITHOUT CURRENT PATHOLOGICAL FRACTURE: Primary | ICD-10-CM

## 2024-12-30 ENCOUNTER — HOSPITAL ENCOUNTER (INPATIENT)
Facility: HOSPITAL | Age: 69
LOS: 1 days | Discharge: HOME | DRG: 065 | End: 2025-01-01
Admitting: FAMILY MEDICINE
Payer: MEDICARE

## 2024-12-30 ENCOUNTER — APPOINTMENT (OUTPATIENT)
Facility: HOSPITAL | Age: 69
DRG: 065 | End: 2024-12-30
Payer: MEDICARE

## 2024-12-30 DIAGNOSIS — R41.82 ALTERED MENTAL STATUS, UNSPECIFIED ALTERED MENTAL STATUS TYPE: Primary | ICD-10-CM

## 2024-12-30 DIAGNOSIS — R07.9 CHEST PAIN, UNSPECIFIED TYPE: ICD-10-CM

## 2024-12-30 LAB
BASE EXCESS BLDV CALC-SCNC: 5.8 MMOL/L
ERYTHROCYTE [DISTWIDTH] IN BLOOD BY AUTOMATED COUNT: 14.2 % (ref 11.5–14.5)
FLUAV RNA SPEC QL NAA+PROBE: NOT DETECTED
FLUBV RNA SPEC QL NAA+PROBE: NOT DETECTED
HCO3 BLDV-SCNC: 32.8 MMOL/L (ref 22–26)
HCT VFR BLD AUTO: 41.3 % (ref 35–47)
HGB BLD-MCNC: 13.6 G/DL (ref 11.5–16)
MCH RBC QN AUTO: 29.2 PG (ref 26–34)
MCHC RBC AUTO-ENTMCNC: 32.9 G/DL (ref 30–36.5)
MCV RBC AUTO: 88.6 FL (ref 80–99)
NRBC # BLD: 0 K/UL (ref 0–0.01)
NRBC BLD-RTO: 0 PER 100 WBC
PCO2 BLDV: 55.4 MMHG (ref 41–52)
PERFORMED BY:: ABNORMAL
PH BLDV: 7.38 (ref 7.23–7.44)
PLATELET # BLD AUTO: 171 K/UL (ref 150–400)
PMV BLD AUTO: 12.4 FL (ref 8.9–12.9)
PO2 BLDV: 31 MMHG (ref 25–40)
RBC # BLD AUTO: 4.66 M/UL (ref 3.8–5.2)
SAO2 % BLDV: 56.4 %
SARS-COV-2 RNA RESP QL NAA+PROBE: NOT DETECTED
SPECIMEN TYPE: ABNORMAL
TROPONIN I SERPL HS-MCNC: 22 NG/L (ref 0–51)
WBC # BLD AUTO: 8.2 K/UL (ref 3.6–11)

## 2024-12-30 PROCEDURE — 84484 ASSAY OF TROPONIN QUANT: CPT

## 2024-12-30 PROCEDURE — 93005 ELECTROCARDIOGRAM TRACING: CPT

## 2024-12-30 PROCEDURE — 80053 COMPREHEN METABOLIC PANEL: CPT

## 2024-12-30 PROCEDURE — 85027 COMPLETE CBC AUTOMATED: CPT

## 2024-12-30 PROCEDURE — 99285 EMERGENCY DEPT VISIT HI MDM: CPT

## 2024-12-30 PROCEDURE — 70450 CT HEAD/BRAIN W/O DYE: CPT

## 2024-12-30 PROCEDURE — 94760 N-INVAS EAR/PLS OXIMETRY 1: CPT

## 2024-12-30 PROCEDURE — 87636 SARSCOV2 & INF A&B AMP PRB: CPT

## 2024-12-30 PROCEDURE — 6370000000 HC RX 637 (ALT 250 FOR IP)

## 2024-12-30 PROCEDURE — 36415 COLL VENOUS BLD VENIPUNCTURE: CPT

## 2024-12-30 RX ORDER — ACETAMINOPHEN 325 MG/1
650 TABLET ORAL
Status: COMPLETED | OUTPATIENT
Start: 2024-12-30 | End: 2024-12-30

## 2024-12-30 RX ADMIN — ACETAMINOPHEN 650 MG: 325 TABLET ORAL at 22:46

## 2024-12-30 ASSESSMENT — LIFESTYLE VARIABLES
HOW MANY STANDARD DRINKS CONTAINING ALCOHOL DO YOU HAVE ON A TYPICAL DAY: PATIENT DOES NOT DRINK
HOW OFTEN DO YOU HAVE A DRINK CONTAINING ALCOHOL: NEVER

## 2024-12-30 ASSESSMENT — PAIN DESCRIPTION - LOCATION: LOCATION: HEAD

## 2024-12-30 ASSESSMENT — PAIN - FUNCTIONAL ASSESSMENT: PAIN_FUNCTIONAL_ASSESSMENT: 0-10

## 2024-12-30 ASSESSMENT — PAIN DESCRIPTION - ORIENTATION: ORIENTATION: LEFT;RIGHT

## 2024-12-30 ASSESSMENT — PAIN SCALES - GENERAL: PAINLEVEL_OUTOF10: 6

## 2024-12-31 ENCOUNTER — APPOINTMENT (OUTPATIENT)
Facility: HOSPITAL | Age: 69
DRG: 065 | End: 2024-12-31
Attending: FAMILY MEDICINE
Payer: MEDICARE

## 2024-12-31 ENCOUNTER — APPOINTMENT (OUTPATIENT)
Facility: HOSPITAL | Age: 69
DRG: 065 | End: 2024-12-31
Payer: MEDICARE

## 2024-12-31 PROBLEM — I63.9 ACUTE CEREBROVASCULAR ACCIDENT (CVA) (HCC): Status: ACTIVE | Noted: 2024-12-31

## 2024-12-31 LAB
ALBUMIN SERPL-MCNC: 2.3 G/DL (ref 3.5–5)
ALBUMIN SERPL-MCNC: 3.7 G/DL (ref 3.5–5)
ALBUMIN/GLOB SERPL: 1 (ref 1.1–2.2)
ALBUMIN/GLOB SERPL: 1.1 (ref 1.1–2.2)
ALP SERPL-CCNC: 40 U/L (ref 45–117)
ALP SERPL-CCNC: 65 U/L (ref 45–117)
ALT SERPL-CCNC: 40 U/L (ref 12–78)
ALT SERPL-CCNC: 67 U/L (ref 12–78)
AMPHET UR QL SCN: NEGATIVE
ANION GAP SERPL CALC-SCNC: 5 MMOL/L (ref 2–12)
ANION GAP SERPL CALC-SCNC: 5 MMOL/L (ref 2–12)
APPEARANCE UR: CLEAR
AST SERPL W P-5'-P-CCNC: 35 U/L (ref 15–37)
AST SERPL W P-5'-P-CCNC: 52 U/L (ref 15–37)
BACTERIA URNS QL MICRO: NEGATIVE /HPF
BARBITURATES UR QL SCN: NEGATIVE
BENZODIAZ UR QL: NEGATIVE
BILIRUB SERPL-MCNC: 0.4 MG/DL (ref 0.2–1)
BILIRUB SERPL-MCNC: 0.5 MG/DL (ref 0.2–1)
BILIRUB UR QL: NEGATIVE
BUN SERPL-MCNC: 11 MG/DL (ref 6–20)
BUN SERPL-MCNC: 8 MG/DL (ref 6–20)
BUN/CREAT SERPL: 16 (ref 12–20)
BUN/CREAT SERPL: 21 (ref 12–20)
CA-I BLD-MCNC: 6.8 MG/DL (ref 8.5–10.1)
CA-I BLD-MCNC: 9.7 MG/DL (ref 8.5–10.1)
CANNABINOIDS UR QL SCN: NEGATIVE
CHLORIDE SERPL-SCNC: 101 MMOL/L (ref 97–108)
CHLORIDE SERPL-SCNC: 114 MMOL/L (ref 97–108)
CO2 SERPL-SCNC: 24 MMOL/L (ref 21–32)
CO2 SERPL-SCNC: 31 MMOL/L (ref 21–32)
COCAINE UR QL SCN: NEGATIVE
COLOR UR: NORMAL
CREAT SERPL-MCNC: 0.38 MG/DL (ref 0.55–1.02)
CREAT SERPL-MCNC: 0.7 MG/DL (ref 0.55–1.02)
ECHO AO ASC DIAM: 3.5 CM
ECHO AO ASCENDING AORTA INDEX: 1.56 CM/M2
ECHO AO ROOT DIAM: 2.9 CM
ECHO AO ROOT INDEX: 1.29 CM/M2
ECHO AV AREA PEAK VELOCITY: 2.7 CM2
ECHO AV AREA VTI: 2.9 CM2
ECHO AV AREA/BSA PEAK VELOCITY: 1.2 CM2/M2
ECHO AV AREA/BSA VTI: 1.3 CM2/M2
ECHO AV MEAN GRADIENT: 4 MMHG
ECHO AV MEAN VELOCITY: 1 M/S
ECHO AV PEAK GRADIENT: 7 MMHG
ECHO AV PEAK VELOCITY: 1.4 M/S
ECHO AV VELOCITY RATIO: 0.57
ECHO AV VTI: 27.9 CM
ECHO BSA: 2.33 M2
ECHO LA AREA 4C: 20 CM2
ECHO LA DIAMETER INDEX: 1.61 CM/M2
ECHO LA DIAMETER: 3.6 CM
ECHO LA MAJOR AXIS: 5.8 CM
ECHO LA TO AORTIC ROOT RATIO: 1.24
ECHO LA VOL MOD A4C: 56 ML (ref 22–52)
ECHO LA VOLUME INDEX MOD A4C: 25 ML/M2 (ref 16–34)
ECHO LV E' LATERAL VELOCITY: 5 CM/S
ECHO LV E' SEPTAL VELOCITY: 5.44 CM/S
ECHO LV EDV A2C: 63 ML
ECHO LV EDV A4C: 167 ML
ECHO LV EDV INDEX A4C: 75 ML/M2
ECHO LV EDV NDEX A2C: 28 ML/M2
ECHO LV EJECTION FRACTION A2C: 54 %
ECHO LV EJECTION FRACTION A4C: 52 %
ECHO LV EJECTION FRACTION BIPLANE: 54 % (ref 55–100)
ECHO LV ESV A2C: 29 ML
ECHO LV ESV A4C: 80 ML
ECHO LV ESV INDEX A2C: 13 ML/M2
ECHO LV ESV INDEX A4C: 36 ML/M2
ECHO LV FRACTIONAL SHORTENING: 37 % (ref 28–44)
ECHO LV INTERNAL DIMENSION DIASTOLE INDEX: 2.28 CM/M2
ECHO LV INTERNAL DIMENSION DIASTOLIC: 5.1 CM (ref 3.9–5.3)
ECHO LV INTERNAL DIMENSION SYSTOLIC INDEX: 1.43 CM/M2
ECHO LV INTERNAL DIMENSION SYSTOLIC: 3.2 CM
ECHO LV IVSD: 0.9 CM (ref 0.6–0.9)
ECHO LV MASS 2D: 188 G (ref 67–162)
ECHO LV MASS INDEX 2D: 83.9 G/M2 (ref 43–95)
ECHO LV POSTERIOR WALL DIASTOLIC: 1.1 CM (ref 0.6–0.9)
ECHO LV RELATIVE WALL THICKNESS RATIO: 0.43
ECHO LVOT AREA: 4.5 CM2
ECHO LVOT AV VTI INDEX: 0.64
ECHO LVOT DIAM: 2.4 CM
ECHO LVOT MEAN GRADIENT: 2 MMHG
ECHO LVOT PEAK GRADIENT: 3 MMHG
ECHO LVOT PEAK VELOCITY: 0.8 M/S
ECHO LVOT STROKE VOLUME INDEX: 35.9 ML/M2
ECHO LVOT SV: 80.5 ML
ECHO LVOT VTI: 17.8 CM
ECHO MV A VELOCITY: 0.7 M/S
ECHO MV AREA VTI: 3.7 CM2
ECHO MV E DECELERATION TIME (DT): 246 MS
ECHO MV E VELOCITY: 0.33 M/S
ECHO MV E/A RATIO: 0.47
ECHO MV E/E' LATERAL: 6.6
ECHO MV E/E' RATIO (AVERAGED): 6.33
ECHO MV E/E' SEPTAL: 6.07
ECHO MV LVOT VTI INDEX: 1.21
ECHO MV MAX VELOCITY: 1.1 M/S
ECHO MV MEAN GRADIENT: 2 MMHG
ECHO MV MEAN VELOCITY: 0.6 M/S
ECHO MV PEAK GRADIENT: 4 MMHG
ECHO MV VTI: 21.6 CM
ECHO PV MAX VELOCITY: 0.9 M/S
ECHO PV MEAN GRADIENT: 2 MMHG
ECHO PV MEAN VELOCITY: 0.7 M/S
ECHO PV PEAK GRADIENT: 3 MMHG
ECHO PV VTI: 22.9 CM
ECHO RA AREA 4C: 7.3 CM2
ECHO RA END SYSTOLIC VOLUME APICAL 4 CHAMBER INDEX BSA: 5 ML/M2
ECHO RA VOLUME: 11 ML
ECHO RV BASAL DIMENSION: 2.8 CM
ECHO RV FREE WALL PEAK S': 10.1 CM/S
ECHO RV TAPSE: 2.6 CM (ref 1.7–?)
EKG ATRIAL RATE: 72 BPM
EKG DIAGNOSIS: NORMAL
EKG P AXIS: 54 DEGREES
EKG P-R INTERVAL: 186 MS
EKG Q-T INTERVAL: 446 MS
EKG QRS DURATION: 152 MS
EKG QTC CALCULATION (BAZETT): 488 MS
EKG R AXIS: -62 DEGREES
EKG T AXIS: 13 DEGREES
EKG VENTRICULAR RATE: 72 BPM
EPITH CASTS URNS QL MICRO: NORMAL /LPF
GLOBULIN SER CALC-MCNC: 2.4 G/DL (ref 2–4)
GLOBULIN SER CALC-MCNC: 3.5 G/DL (ref 2–4)
GLUCOSE SERPL-MCNC: 103 MG/DL (ref 65–100)
GLUCOSE SERPL-MCNC: 108 MG/DL (ref 65–100)
GLUCOSE UR STRIP.AUTO-MCNC: NEGATIVE MG/DL
HGB UR QL STRIP: NEGATIVE
KETONES UR QL STRIP.AUTO: NEGATIVE MG/DL
LEUKOCYTE ESTERASE UR QL STRIP.AUTO: NEGATIVE
Lab: NORMAL
METHADONE UR QL: NEGATIVE
NITRITE UR QL STRIP.AUTO: NEGATIVE
OPIATES UR QL: NEGATIVE
PCP UR QL: NEGATIVE
PH UR STRIP: 7 (ref 5–8)
POTASSIUM SERPL-SCNC: 2.8 MMOL/L (ref 3.5–5.1)
POTASSIUM SERPL-SCNC: 3.8 MMOL/L (ref 3.5–5.1)
PROT SERPL-MCNC: 4.7 G/DL (ref 6.4–8.2)
PROT SERPL-MCNC: 7.2 G/DL (ref 6.4–8.2)
PROT UR STRIP-MCNC: NEGATIVE MG/DL
RBC #/AREA URNS HPF: NORMAL /HPF (ref 0–5)
SODIUM SERPL-SCNC: 137 MMOL/L (ref 136–145)
SODIUM SERPL-SCNC: 143 MMOL/L (ref 136–145)
SP GR UR REFRACTOMETRY: 1.01 (ref 1–1.03)
URINE CULTURE IF INDICATED: NORMAL
UROBILINOGEN UR QL STRIP.AUTO: 0.1 EU/DL (ref 0.1–1)
WBC URNS QL MICRO: NORMAL /HPF (ref 0–4)

## 2024-12-31 PROCEDURE — 97530 THERAPEUTIC ACTIVITIES: CPT

## 2024-12-31 PROCEDURE — 2500000003 HC RX 250 WO HCPCS: Performed by: FAMILY MEDICINE

## 2024-12-31 PROCEDURE — 6370000000 HC RX 637 (ALT 250 FOR IP): Performed by: STUDENT IN AN ORGANIZED HEALTH CARE EDUCATION/TRAINING PROGRAM

## 2024-12-31 PROCEDURE — 70551 MRI BRAIN STEM W/O DYE: CPT

## 2024-12-31 PROCEDURE — 92610 EVALUATE SWALLOWING FUNCTION: CPT

## 2024-12-31 PROCEDURE — 6370000000 HC RX 637 (ALT 250 FOR IP): Performed by: FAMILY MEDICINE

## 2024-12-31 PROCEDURE — 1100000000 HC RM PRIVATE

## 2024-12-31 PROCEDURE — 93880 EXTRACRANIAL BILAT STUDY: CPT

## 2024-12-31 PROCEDURE — 97161 PT EVAL LOW COMPLEX 20 MIN: CPT

## 2024-12-31 PROCEDURE — 80307 DRUG TEST PRSMV CHEM ANLYZR: CPT

## 2024-12-31 PROCEDURE — 2700000000 HC OXYGEN THERAPY PER DAY

## 2024-12-31 PROCEDURE — 2580000003 HC RX 258: Performed by: FAMILY MEDICINE

## 2024-12-31 PROCEDURE — 80053 COMPREHEN METABOLIC PANEL: CPT

## 2024-12-31 PROCEDURE — 93306 TTE W/DOPPLER COMPLETE: CPT

## 2024-12-31 PROCEDURE — 6360000002 HC RX W HCPCS: Performed by: PSYCHIATRY & NEUROLOGY

## 2024-12-31 PROCEDURE — 6370000000 HC RX 637 (ALT 250 FOR IP): Performed by: PHYSICIAN ASSISTANT

## 2024-12-31 PROCEDURE — 81001 URINALYSIS AUTO W/SCOPE: CPT

## 2024-12-31 PROCEDURE — 2580000003 HC RX 258: Performed by: PSYCHIATRY & NEUROLOGY

## 2024-12-31 PROCEDURE — 2500000003 HC RX 250 WO HCPCS: Performed by: PSYCHIATRY & NEUROLOGY

## 2024-12-31 PROCEDURE — 99222 1ST HOSP IP/OBS MODERATE 55: CPT | Performed by: PSYCHIATRY & NEUROLOGY

## 2024-12-31 RX ORDER — POTASSIUM CHLORIDE 750 MG/1
10 CAPSULE, EXTENDED RELEASE ORAL DAILY
COMMUNITY

## 2024-12-31 RX ORDER — PRAMIPEXOLE DIHYDROCHLORIDE 0.12 MG/1
0.25 TABLET ORAL NIGHTLY
Status: DISCONTINUED | OUTPATIENT
Start: 2024-12-31 | End: 2024-12-31

## 2024-12-31 RX ORDER — LANOLIN ALCOHOL/MO/W.PET/CERES
5000 CREAM (GRAM) TOPICAL DAILY
Status: DISCONTINUED | OUTPATIENT
Start: 2024-12-31 | End: 2025-01-01 | Stop reason: HOSPADM

## 2024-12-31 RX ORDER — TIZANIDINE 2 MG/1
2 TABLET ORAL EVERY 8 HOURS PRN
COMMUNITY

## 2024-12-31 RX ORDER — SODIUM CHLORIDE 9 MG/ML
INJECTION, SOLUTION INTRAVENOUS PRN
Status: DISCONTINUED | OUTPATIENT
Start: 2024-12-31 | End: 2025-01-01 | Stop reason: HOSPADM

## 2024-12-31 RX ORDER — TRAZODONE HYDROCHLORIDE 100 MG/1
100 TABLET ORAL NIGHTLY
COMMUNITY

## 2024-12-31 RX ORDER — CETIRIZINE HYDROCHLORIDE 10 MG/1
5 TABLET ORAL DAILY
Status: DISCONTINUED | OUTPATIENT
Start: 2024-12-31 | End: 2025-01-01 | Stop reason: HOSPADM

## 2024-12-31 RX ORDER — LEVOTHYROXINE SODIUM 25 UG/1
75 TABLET ORAL
Status: DISCONTINUED | OUTPATIENT
Start: 2024-12-31 | End: 2025-01-01 | Stop reason: HOSPADM

## 2024-12-31 RX ORDER — TIZANIDINE 2 MG/1
2 TABLET ORAL NIGHTLY
Status: DISCONTINUED | OUTPATIENT
Start: 2024-12-31 | End: 2025-01-01 | Stop reason: HOSPADM

## 2024-12-31 RX ORDER — ATORVASTATIN CALCIUM 40 MG/1
40 TABLET, FILM COATED ORAL DAILY
Status: DISCONTINUED | OUTPATIENT
Start: 2024-12-31 | End: 2025-01-01 | Stop reason: HOSPADM

## 2024-12-31 RX ORDER — SODIUM CHLORIDE 0.9 % (FLUSH) 0.9 %
5-40 SYRINGE (ML) INJECTION EVERY 12 HOURS SCHEDULED
Status: DISCONTINUED | OUTPATIENT
Start: 2024-12-31 | End: 2025-01-01 | Stop reason: HOSPADM

## 2024-12-31 RX ORDER — PRAMIPEXOLE DIHYDROCHLORIDE 0.12 MG/1
0.25 TABLET ORAL NIGHTLY
Status: DISCONTINUED | OUTPATIENT
Start: 2024-12-31 | End: 2025-01-01 | Stop reason: HOSPADM

## 2024-12-31 RX ORDER — ONDANSETRON 2 MG/ML
4 INJECTION INTRAMUSCULAR; INTRAVENOUS EVERY 6 HOURS PRN
Status: DISCONTINUED | OUTPATIENT
Start: 2024-12-31 | End: 2025-01-01 | Stop reason: HOSPADM

## 2024-12-31 RX ORDER — METFORMIN HYDROCHLORIDE 500 MG/1
500 TABLET, EXTENDED RELEASE ORAL
COMMUNITY

## 2024-12-31 RX ORDER — POTASSIUM CHLORIDE 7.45 MG/ML
10 INJECTION INTRAVENOUS PRN
Status: DISCONTINUED | OUTPATIENT
Start: 2024-12-31 | End: 2025-01-01 | Stop reason: HOSPADM

## 2024-12-31 RX ORDER — ONDANSETRON 4 MG/1
4 TABLET, ORALLY DISINTEGRATING ORAL EVERY 8 HOURS PRN
Status: DISCONTINUED | OUTPATIENT
Start: 2024-12-31 | End: 2025-01-01 | Stop reason: HOSPADM

## 2024-12-31 RX ORDER — FUROSEMIDE 40 MG/1
20 TABLET ORAL 2 TIMES DAILY
Status: DISCONTINUED | OUTPATIENT
Start: 2024-12-31 | End: 2024-12-31

## 2024-12-31 RX ORDER — PROCHLORPERAZINE EDISYLATE 5 MG/ML
10 INJECTION INTRAMUSCULAR; INTRAVENOUS EVERY 8 HOURS
Status: DISCONTINUED | OUTPATIENT
Start: 2024-12-31 | End: 2025-01-01 | Stop reason: HOSPADM

## 2024-12-31 RX ORDER — PANTOPRAZOLE SODIUM 40 MG/1
40 TABLET, DELAYED RELEASE ORAL
Status: DISCONTINUED | OUTPATIENT
Start: 2024-12-31 | End: 2025-01-01 | Stop reason: HOSPADM

## 2024-12-31 RX ORDER — POLYETHYLENE GLYCOL 3350 17 G/17G
17 POWDER, FOR SOLUTION ORAL DAILY PRN
Status: DISCONTINUED | OUTPATIENT
Start: 2024-12-31 | End: 2025-01-01 | Stop reason: HOSPADM

## 2024-12-31 RX ORDER — SODIUM CHLORIDE 0.9 % (FLUSH) 0.9 %
5-40 SYRINGE (ML) INJECTION PRN
Status: DISCONTINUED | OUTPATIENT
Start: 2024-12-31 | End: 2025-01-01 | Stop reason: HOSPADM

## 2024-12-31 RX ORDER — FUROSEMIDE 40 MG/1
20 TABLET ORAL DAILY
Status: DISCONTINUED | OUTPATIENT
Start: 2024-12-31 | End: 2025-01-01 | Stop reason: HOSPADM

## 2024-12-31 RX ORDER — KETOROLAC TROMETHAMINE 15 MG/ML
15 INJECTION, SOLUTION INTRAMUSCULAR; INTRAVENOUS EVERY 8 HOURS
Status: DISCONTINUED | OUTPATIENT
Start: 2024-12-31 | End: 2025-01-01 | Stop reason: HOSPADM

## 2024-12-31 RX ORDER — TRIAMCINOLONE ACETONIDE 1 MG/G
CREAM TOPICAL 2 TIMES DAILY
COMMUNITY

## 2024-12-31 RX ORDER — ACETAMINOPHEN 325 MG/1
650 TABLET ORAL EVERY 6 HOURS PRN
Status: DISCONTINUED | OUTPATIENT
Start: 2024-12-31 | End: 2025-01-01 | Stop reason: HOSPADM

## 2024-12-31 RX ORDER — ROPINIROLE 1 MG/1
1 TABLET, FILM COATED ORAL NIGHTLY
COMMUNITY

## 2024-12-31 RX ORDER — POTASSIUM CHLORIDE 1500 MG/1
40 TABLET, EXTENDED RELEASE ORAL PRN
Status: DISCONTINUED | OUTPATIENT
Start: 2024-12-31 | End: 2025-01-01 | Stop reason: HOSPADM

## 2024-12-31 RX ORDER — TRAZODONE HYDROCHLORIDE 50 MG/1
100 TABLET, FILM COATED ORAL NIGHTLY
Status: DISCONTINUED | OUTPATIENT
Start: 2024-12-31 | End: 2025-01-01 | Stop reason: HOSPADM

## 2024-12-31 RX ORDER — DULOXETIN HYDROCHLORIDE 30 MG/1
30 CAPSULE, DELAYED RELEASE ORAL 2 TIMES DAILY
Status: DISCONTINUED | OUTPATIENT
Start: 2024-12-31 | End: 2025-01-01 | Stop reason: HOSPADM

## 2024-12-31 RX ORDER — ACETAMINOPHEN 650 MG/1
650 SUPPOSITORY RECTAL EVERY 6 HOURS PRN
Status: DISCONTINUED | OUTPATIENT
Start: 2024-12-31 | End: 2025-01-01 | Stop reason: HOSPADM

## 2024-12-31 RX ORDER — MONTELUKAST SODIUM 10 MG/1
10 TABLET ORAL NIGHTLY
COMMUNITY

## 2024-12-31 RX ORDER — ALBUTEROL SULFATE 90 UG/1
2 INHALANT RESPIRATORY (INHALATION) EVERY 6 HOURS PRN
Status: DISCONTINUED | OUTPATIENT
Start: 2024-12-31 | End: 2025-01-01 | Stop reason: HOSPADM

## 2024-12-31 RX ORDER — POLYETHYLENE GLYCOL 3350 17 G/17G
17 POWDER, FOR SOLUTION ORAL DAILY PRN
Status: DISCONTINUED | OUTPATIENT
Start: 2024-12-31 | End: 2024-12-31

## 2024-12-31 RX ORDER — SODIUM CHLORIDE 9 MG/ML
INJECTION, SOLUTION INTRAVENOUS CONTINUOUS
Status: DISCONTINUED | OUTPATIENT
Start: 2024-12-31 | End: 2025-01-01 | Stop reason: HOSPADM

## 2024-12-31 RX ORDER — ASPIRIN 81 MG/1
81 TABLET ORAL DAILY
Status: DISCONTINUED | OUTPATIENT
Start: 2024-12-31 | End: 2025-01-01 | Stop reason: HOSPADM

## 2024-12-31 RX ORDER — MAGNESIUM SULFATE IN WATER 40 MG/ML
2000 INJECTION, SOLUTION INTRAVENOUS PRN
Status: DISCONTINUED | OUTPATIENT
Start: 2024-12-31 | End: 2025-01-01 | Stop reason: HOSPADM

## 2024-12-31 RX ORDER — IPRATROPIUM BROMIDE AND ALBUTEROL SULFATE 2.5; .5 MG/3ML; MG/3ML
1 SOLUTION RESPIRATORY (INHALATION) EVERY 6 HOURS PRN
Status: DISCONTINUED | OUTPATIENT
Start: 2024-12-31 | End: 2025-01-01 | Stop reason: HOSPADM

## 2024-12-31 RX ORDER — ALPRAZOLAM 0.5 MG
0.5 TABLET ORAL ONCE
Status: COMPLETED | OUTPATIENT
Start: 2024-12-31 | End: 2024-12-31

## 2024-12-31 RX ADMIN — PROCHLORPERAZINE EDISYLATE 10 MG: 5 INJECTION INTRAMUSCULAR; INTRAVENOUS at 19:20

## 2024-12-31 RX ADMIN — RIVAROXABAN 10 MG: 10 TABLET, FILM COATED ORAL at 09:08

## 2024-12-31 RX ADMIN — SODIUM CHLORIDE 500 MG: 9 INJECTION, SOLUTION INTRAVENOUS at 19:31

## 2024-12-31 RX ADMIN — LEVOTHYROXINE SODIUM 75 MCG: 0.03 TABLET ORAL at 05:34

## 2024-12-31 RX ADMIN — ACETAMINOPHEN 650 MG: 325 TABLET ORAL at 05:34

## 2024-12-31 RX ADMIN — SODIUM CHLORIDE: 9 INJECTION, SOLUTION INTRAVENOUS at 03:11

## 2024-12-31 RX ADMIN — TIZANIDINE 2 MG: 2 TABLET ORAL at 22:21

## 2024-12-31 RX ADMIN — FUROSEMIDE 20 MG: 40 TABLET ORAL at 09:01

## 2024-12-31 RX ADMIN — TRAZODONE HYDROCHLORIDE 100 MG: 50 TABLET ORAL at 22:21

## 2024-12-31 RX ADMIN — CETIRIZINE HYDROCHLORIDE 5 MG: 10 TABLET, FILM COATED ORAL at 09:00

## 2024-12-31 RX ADMIN — CYANOCOBALAMIN TAB 1000 MCG 5000 MCG: 1000 TAB at 16:19

## 2024-12-31 RX ADMIN — ATORVASTATIN CALCIUM 40 MG: 40 TABLET, FILM COATED ORAL at 09:00

## 2024-12-31 RX ADMIN — ALPRAZOLAM 0.5 MG: 0.5 TABLET ORAL at 09:17

## 2024-12-31 RX ADMIN — ASPIRIN 81 MG: 81 TABLET, COATED ORAL at 09:01

## 2024-12-31 RX ADMIN — PANTOPRAZOLE SODIUM 40 MG: 40 TABLET, DELAYED RELEASE ORAL at 05:34

## 2024-12-31 RX ADMIN — PRAMIPEXOLE DIHYDROCHLORIDE 0.25 MG: 0.12 TABLET ORAL at 20:45

## 2024-12-31 RX ADMIN — PRAMIPEXOLE DIHYDROCHLORIDE 0.25 MG: 0.12 TABLET ORAL at 03:32

## 2024-12-31 RX ADMIN — SODIUM CHLORIDE, PRESERVATIVE FREE 10 ML: 5 INJECTION INTRAVENOUS at 09:09

## 2024-12-31 RX ADMIN — DULOXETINE HYDROCHLORIDE 30 MG: 30 CAPSULE, DELAYED RELEASE ORAL at 20:46

## 2024-12-31 RX ADMIN — KETOROLAC TROMETHAMINE 15 MG: 15 INJECTION, SOLUTION INTRAMUSCULAR; INTRAVENOUS at 19:20

## 2024-12-31 RX ADMIN — DULOXETINE HYDROCHLORIDE 30 MG: 30 CAPSULE, DELAYED RELEASE ORAL at 09:00

## 2024-12-31 ASSESSMENT — PAIN SCALES - GENERAL
PAINLEVEL_OUTOF10: 4
PAINLEVEL_OUTOF10: 6
PAINLEVEL_OUTOF10: 5

## 2024-12-31 ASSESSMENT — PAIN - FUNCTIONAL ASSESSMENT: PAIN_FUNCTIONAL_ASSESSMENT: PREVENTS OR INTERFERES SOME ACTIVE ACTIVITIES AND ADLS

## 2024-12-31 ASSESSMENT — PAIN DESCRIPTION - LOCATION
LOCATION: HEAD
LOCATION: HEAD

## 2024-12-31 ASSESSMENT — PAIN SCALES - WONG BAKER: WONGBAKER_NUMERICALRESPONSE: HURTS LITTLE MORE

## 2024-12-31 ASSESSMENT — PAIN DESCRIPTION - DESCRIPTORS
DESCRIPTORS: ACHING;THROBBING
DESCRIPTORS: ACHING

## 2024-12-31 NOTE — ED PROVIDER NOTES
None    Smoking Cessation: Not Applicable    PROCEDURES   Unless otherwise noted above, none  Procedures      CRITICAL CARE TIME   Patient does not meet Critical Care Time, 0 minutes    ED IMPRESSION   No diagnosis found.      DISPOSITION/PLAN   DISPOSITION               Admit Note: Pt is being admitted by Hospitalist . The results of their tests and reason(s) for their admission have been discussed with pt and/or available family. They convey agreement and understanding for the need to be admitted and for the admission diagnosis.     PATIENT REFERRED TO:  No follow-up provider specified.      DISCHARGE MEDICATIONS:     Medication List        ASK your doctor about these medications      acetaminophen 500 MG tablet  Commonly known as: TYLENOL  Take 1 tablet by mouth as needed for Pain     albuterol sulfate  (90 Base) MCG/ACT inhaler  Commonly known as: PROVENTIL;VENTOLIN;PROAIR     aspirin 81 MG EC tablet     atorvastatin 40 MG tablet  Commonly known as: LIPITOR     clonazePAM 0.5 MG tablet  Commonly known as: KlonoPIN  Take 1 tablet by mouth nightly as needed for Anxiety for up to 5 days. Max Daily Amount: 0.5 mg     cyanocobalamin 1000 MCG tablet     DULoxetine 30 MG extended release capsule  Commonly known as: CYMBALTA     fexofenadine 180 MG tablet  Commonly known as: ALLEGRA     furosemide 20 MG tablet  Commonly known as: Lasix  Take 1 tablet by mouth 2 times daily     ipratropium 0.5 mg-albuterol 2.5 mg 0.5-2.5 (3) MG/3ML Soln nebulizer solution  Commonly known as: DUONEB     levothyroxine 75 MCG tablet  Commonly known as: SYNTHROID     omeprazole 20 MG tablet  Commonly known as: PRILOSEC OTC     pramipexole 0.25 MG tablet  Commonly known as: MIRAPEX     Xarelto 10 MG Tabs tablet  Generic drug: rivaroxaban                DISCONTINUED MEDICATIONS:  Current Discharge Medication List          I am the Primary Clinician of Record. Winsome Montanez MD (electronically signed)    (Please note that parts

## 2024-12-31 NOTE — THERAPY EVALUATION
Speech LAnguage Pathology Dysphagia EVALUATION/DISCHARGE    Patient: Maritza Padron (69 y.o. female)  Date: 12/31/2024  Primary Diagnosis: Altered mental status, unspecified altered mental status type [R41.82]  Acute cerebrovascular accident (CVA) ([facility]) [I63.9]  Chest pain, unspecified type [R07.9]       Precautions: aspiration  Time In: 1220  Time Out: 1235    DIET RECOMMENDATIONS: Easy to chew and thin liquids    SWALLOW SAFETY PRECAUTIONS:  aspiration and GERD precautions, meds as tolerated      ASSESSMENT :  Based on the objective data described below, the patient presents with oropharyngeal sw function WFL.  Pt's speech is non-dysarthric and pt is oriented x 4. Pt reports difficulty with word finding and though cohesion that is changed from baseline following prior CVA.     Pt seen for bedside sw evaluation, see details below.   Pt admitted for CVA workup c/b headache, blurred vision, confusion.   Hx includes CVA in 2015 with STM loss residual deficits; however, pt denies difficulty with speech/lang or sw function following previous CVA. Pt lives with her son.     Pt is edentulous and Cachil DeHe. Dentures and hearing aids are at home. Pt understands with increased amplitude provided. Pt is able to effectively communicate. Breathy vocal quality is noted and pt report this is a change from baseline.      Pt self feeds without difficulty.  Mildly prolonged mastication related to edentulousness. Pt tolerates all trials with min cough x 1 with sequential straw sips, otherwise, no overt s/s aspiration.     Pt does report difficulty with thought cohesion and word finding that is a change from baseline.  Recommend further cog-ling assessment in next level of care with SLP,  SLP if appropriate.     MRI brain results noted: \"Suspect small patchy acute versus subacute infarct along right PICA territory versus artifact. Clinical correlation advised.Chronic findings include small patchy infarct along posterior right MCA

## 2024-12-31 NOTE — PROGRESS NOTES
Hospitalist Progress Note               Daily Progress Note: 12/31/2024      Hospital Day: 2     Chief complaint:   Chief Complaint   Patient presents with    Headache    Eye Problem        Subjective:     Patient is seen today for follow-up.  Patient seen examined at bedside.  Denies any acute complaints.        Medications reviewed  Current Facility-Administered Medications   Medication Dose Route Frequency    albuterol sulfate HFA (PROVENTIL;VENTOLIN;PROAIR) 108 (90 Base) MCG/ACT inhaler 2 puff  2 puff Inhalation Q6H PRN    aspirin EC tablet 81 mg  81 mg Oral Daily    atorvastatin (LIPITOR) tablet 40 mg  40 mg Oral Daily    vitamin B-12 (CYANOCOBALAMIN) tablet 5,000 mcg  5,000 mcg Oral Daily    DULoxetine (CYMBALTA) extended release capsule 30 mg  30 mg Oral BID    cetirizine (ZYRTEC) tablet 5 mg  5 mg Oral Daily    ipratropium 0.5 mg-albuterol 2.5 mg (DUONEB) nebulizer solution 1 Dose  1 Dose Inhalation Q6H PRN    levothyroxine (SYNTHROID) tablet 75 mcg  75 mcg Oral QAM AC    pantoprazole (PROTONIX) tablet 40 mg  40 mg Oral QAM AC    rivaroxaban (XARELTO) tablet 10 mg  10 mg Oral Daily    0.9 % sodium chloride infusion   IntraVENous Continuous    sodium chloride flush 0.9 % injection 5-40 mL  5-40 mL IntraVENous 2 times per day    sodium chloride flush 0.9 % injection 5-40 mL  5-40 mL IntraVENous PRN    0.9 % sodium chloride infusion   IntraVENous PRN    potassium chloride (KLOR-CON M) extended release tablet 40 mEq  40 mEq Oral PRN    Or    potassium bicarb-citric acid (EFFER-K) effervescent tablet 40 mEq  40 mEq Oral PRN    Or    potassium chloride 10 mEq/100 mL IVPB (Peripheral Line)  10 mEq IntraVENous PRN    magnesium sulfate 2000 mg in 50 mL IVPB premix  2,000 mg IntraVENous PRN    ondansetron (ZOFRAN-ODT) disintegrating tablet 4 mg  4 mg Oral Q8H PRN    Or    ondansetron (ZOFRAN) injection 4 mg  4 mg IntraVENous Q6H PRN    acetaminophen (TYLENOL) tablet 650 mg  650 mg Oral Q6H PRN    Or

## 2024-12-31 NOTE — CARE COORDINATION
12/31/24 1507   Service Assessment   Patient Orientation Alert and Oriented   Cognition Alert   History Provided By Patient   Primary Caregiver Self   Accompanied By/Relationship Daughter and grandaughter at bedside   Support Systems Family Members   Patient's Healthcare Decision Maker is: Legal Next of Kin   PCP Verified by CM Yes  ([facility])   Last Visit to PCP Within last 6 months   Prior Functional Level Independent in ADLs/IADLs   Current Functional Level Independent in ADLs/IADLs   Can patient return to prior living arrangement Yes   Ability to make needs known: Good   Family able to assist with home care needs: Yes   Would you like for me to discuss the discharge plan with any other family members/significant others, and if so, who? Yes  (Daughter)   Financial Resources Medicaid;Medicare   Social/Functional History   Lives With Son   Type of Home House   Home Access Stairs to enter with rails   Entrance Stairs - Number of Steps 5   Occupation Retired   Discharge Planning   Current Services Prior To Admission Oxygen Therapy  ([facility])   Services At/After Discharge   Transition of Care Consult (CM Consult) Discharge Planning     CM met f/f with patient. Her daughter and grandaughter also present in room. Address on file confirmed. Patient lives with her son and is independent in ADL. Hx of HH and IRF. No SNF.  Chronic O2 @ 3 lpm ([facility])    PT/OT evals    Current DCP: Home    Advance Care Planning     General Advance Care Planning (ACP) Conversation    Date of Conversation: 12/31/2024  Conducted with: Patient with Decision Making Capacity  Other persons present: Daughter and granddaughter at bedside    Healthcare Decision Maker:   Primary Decision Maker: ShalondaariaSteven - Child - [phone]     Today we documented Decision Maker(s) consistent with Legal Next of Kin hierarchy.  Content/Action Overview:  Has NO ACP documents-Information provided  Reviewed DNR/DNI and patient elects Full

## 2024-12-31 NOTE — ED NOTES
Bedside neuro exam performed at this time with JOCELYNE Cao  
lb)    Height: 1.727 m (5' 8\")      Deterioration Index (DI): Deterioration Index: 32.2  Deterioration Index (DI) Interventions Performed:    O2 Flow Rate: O2 Flow Rate (L/min): 3 L/min (Simultaneous filing. User may not have seen previous data.)  O2 Device: O2 Device: Nasal cannula (Simultaneous filing. User may not have seen previous data.)  Cardiac Rhythm: Cardiac Rhythm: Normal sinus rhythm  Critical Lab Results: [unfilled]  Cultures: Cultures:  NIH Score: NIH NIH Stroke Scale  Interval: Reassessment  Level of Consciousness (1a): Alert  LOC Questions (1b): Answers both correctly  LOC Commands (1c): Performs both tasks correctly  Best Gaze (2): Normal  Visual (3): No visual loss  Facial Palsy (4): Normal symmetrical movement  Motor Arm, Left (5a): No drift  Motor Arm, Right (5b): No drift  Motor Leg, Left (6a): No drift  Motor Leg, Right (6b): No drift  Limb Ataxia (7): Absent  Sensory (8): Normal  Best Language (9): No aphasia  Dysarthria (10): Normal  Extinction and Inattention (11): No abnormality  Total: 0   Active LDA's:   Peripheral IV 12/30/24 Right Forearm (Active)   Site Assessment Clean, dry & intact 12/30/24 2238   Line Status Blood return noted 12/30/24 2238   Phlebitis Assessment No symptoms 12/30/24 2238   Infiltration Assessment 0 12/30/24 2238   Dressing Status New dressing applied 12/30/24 2238   Dressing Intervention New 12/30/24 2238     Active Central Lines:                          Active Wounds:    Active Danielle's:    Active Feeding Tubes:      Administered Medications:   Medications   acetaminophen (TYLENOL) tablet 650 mg (650 mg Oral Given 12/30/24 2246)     Last documented pain medication administration: 2246  Pertinent or High Risk Medications/Drips: no   If Yes, please provide details:   Blood Product Administration: no  If Yes, please provide details:   Process Protocols/Bundles:     Recommendation  Incomplete STAT orders: none  Overdue Medications: none  Patient Belongings:    Additional

## 2024-12-31 NOTE — PROGRESS NOTES
Received Order for Telemetry     Maritza Padron   1955   [id number]   Altered mental status, unspecified altered mental status type [R41.82]  Acute cerebrovascular accident (CVA) ([facility]) [I63.9]  Chest pain, unspecified type [R07.9]   Maryellen Chacon MD     Tele Box # [id number] placed on patient at  0231 am  ER Room # 7  Admitting to Room 584  Verified with Primary Nurse MARYJANE at  0231 am

## 2024-12-31 NOTE — CONSULTS
Impression/Recommendations:   Status Migrainosus: a migraine lasting more than 24 hours may take more than home regimens can manage to stop it.    The patient may do well with a 24-hour regimen to arrest this migraine attack: IV hydration with a “cocktail” of Depacon 500 mg + Vistaril 25 mg + Toradol, given simultaneously IV Q8 hours x3 (and then may repeat prn).  Other analgesics can be used for breakthrough pain, and a sleep agent may be used at night.  Keep the room quiet and dark - no TV or radio - and limit visitors to two at a time.    If she has more of these attacks, she should talk to the Neurologist she is going to see about problem #2.     2. Question of early memory changes: This question must be assessed outpatient when the patient is medically stable. Discussed at length with the patient and her daughter that the sooner this is assessed, the better. If minimal cognitive impairment is determined to be present, then measures can be taken to delay progression, sometimes for years. She should have an appointment set up with a Neurologist before she is discharged. Please have Case management help make arrangements.     Discussed with the patient, patient's family and Dr. Cao.  Thank you. Will follow  Total time:     HPI:   70 yo WF with prior history of stroke, breast cancer s/p radiation, presented to the ER with 4-5 days of blurred vision and headache. Family noted confusion and word-finding difficulty like she had with her stroke in 2015.   She and her family say that her memory has had increasing memory difficulties.   She describes a headache of bilateral pressure accompanied by photophobia, phonophobia and nausea.     Neuro ROS  No headache, vertigo or vision change.  No difficulties with speech or swallowing.  No numbness, weakness or trouble with gait.      PMH:   Past Medical History:   Diagnosis Date    Breast cancer (HCC) 2015    Right    Chronic obstructive pulmonary disease (HCC)     Diabetes

## 2024-12-31 NOTE — ED TRIAGE NOTES
Pt CC is bilateral blurry vision and headache on both sides of head. Pt has hx of stoke in 2018. Has short term memory loss from that stroke. Has had headache and confusion for a couple days. NIH 1

## 2024-12-31 NOTE — H&P
17 g, 17 g, Oral, Daily PRN, Maryellen Chacon MD    Current Outpatient Medications:     acetaminophen (TYLENOL) 500 MG tablet, Take 1 tablet by mouth as needed for Pain, Disp: 120 tablet, Rfl: 3    clonazePAM (KLONOPIN) 0.5 MG tablet, Take 1 tablet by mouth nightly as needed for Anxiety for up to 5 days. Max Daily Amount: 0.5 mg, Disp: 5 tablet, Rfl: 0    furosemide (LASIX) 20 MG tablet, Take 1 tablet by mouth 2 times daily, Disp: 60 tablet, Rfl: 0    albuterol sulfate HFA (PROVENTIL;VENTOLIN;PROAIR) 108 (90 Base) MCG/ACT inhaler, Inhale 2 puffs into the lungs every 6 hours as needed, Disp: , Rfl:     aspirin 81 MG EC tablet, Take 1 tablet by mouth daily, Disp: , Rfl:     atorvastatin (LIPITOR) 40 MG tablet, Take 1 tablet by mouth daily, Disp: , Rfl:     cyanocobalamin 1000 MCG tablet, Take 5 tablets by mouth daily, Disp: , Rfl:     DULoxetine (CYMBALTA) 30 MG extended release capsule, Take 1 capsule by mouth 2 times daily, Disp: , Rfl:     fexofenadine (ALLEGRA) 180 MG tablet, Take 1 tablet by mouth daily, Disp: , Rfl:     ipratropium-albuterol (DUONEB) 0.5-2.5 (3) MG/3ML SOLN nebulizer solution, Inhale 3 mLs into the lungs every 6 hours as needed, Disp: , Rfl:     levothyroxine (SYNTHROID) 75 MCG tablet, Take 1 tablet by mouth every morning (before breakfast), Disp: , Rfl:     omeprazole (PRILOSEC OTC) 20 MG tablet, Take 1 tablet by mouth daily, Disp: , Rfl:     pramipexole (MIRAPEX) 0.25 MG tablet, Take 1 tablet by mouth, Disp: , Rfl:     rivaroxaban (XARELTO) 10 MG TABS tablet, Take 1 tablet by mouth daily, Disp: , Rfl:                                Medical Decision Making:   I personally reviewed labs: CBC CMP  I personally reviewed imaging: CT scan of the head  Toxic drug monitoring: Drug screening  Discussed case with: ED provider. After discussion I am in agreement that acuity of patient's medical condition necessitates hospital stay.      Code Status: Full code  DVT Prophylaxis: On Xarelto  GI

## 2025-01-01 VITALS
DIASTOLIC BLOOD PRESSURE: 55 MMHG | RESPIRATION RATE: 18 BRPM | SYSTOLIC BLOOD PRESSURE: 132 MMHG | BODY MASS INDEX: 37.36 KG/M2 | WEIGHT: 246.47 LBS | TEMPERATURE: 97.5 F | HEIGHT: 68 IN | OXYGEN SATURATION: 95 % | HEART RATE: 71 BPM

## 2025-01-01 LAB
BASOPHILS # BLD: 0 K/UL (ref 0–0.1)
BASOPHILS NFR BLD: 0 % (ref 0–1)
CHOLEST SERPL-MCNC: 108 MG/DL
DIFFERENTIAL METHOD BLD: ABNORMAL
ECHO BSA: 2.33 M2
EOSINOPHIL # BLD: 0.2 K/UL (ref 0–0.4)
EOSINOPHIL NFR BLD: 2 % (ref 0–7)
ERYTHROCYTE [DISTWIDTH] IN BLOOD BY AUTOMATED COUNT: 14.1 % (ref 11.5–14.5)
EST. AVERAGE GLUCOSE BLD GHB EST-MCNC: 166 MG/DL
HBA1C MFR BLD: 7.4 % (ref 4–5.6)
HCT VFR BLD AUTO: 37.9 % (ref 35–47)
HDLC SERPL-MCNC: 45 MG/DL
HDLC SERPL: 2.4 (ref 0–5)
HGB BLD-MCNC: 12.3 G/DL (ref 11.5–16)
IMM GRANULOCYTES # BLD AUTO: 0 K/UL (ref 0–0.04)
IMM GRANULOCYTES NFR BLD AUTO: 0 % (ref 0–0.5)
LDLC SERPL CALC-MCNC: 39.6 MG/DL (ref 0–100)
LIPID PANEL: NORMAL
LYMPHOCYTES # BLD: 2.2 K/UL (ref 0.8–3.5)
LYMPHOCYTES NFR BLD: 32 % (ref 12–49)
MCH RBC QN AUTO: 28.9 PG (ref 26–34)
MCHC RBC AUTO-ENTMCNC: 32.5 G/DL (ref 30–36.5)
MCV RBC AUTO: 89.2 FL (ref 80–99)
MONOCYTES # BLD: 0.7 K/UL (ref 0–1)
MONOCYTES NFR BLD: 11 % (ref 5–13)
NEUTS SEG # BLD: 3.7 K/UL (ref 1.8–8)
NEUTS SEG NFR BLD: 55 % (ref 32–75)
NRBC # BLD: 0 K/UL (ref 0–0.01)
NRBC BLD-RTO: 0 PER 100 WBC
PLATELET # BLD AUTO: 133 K/UL (ref 150–400)
PMV BLD AUTO: 12.1 FL (ref 8.9–12.9)
RBC # BLD AUTO: 4.25 M/UL (ref 3.8–5.2)
TRIGL SERPL-MCNC: 117 MG/DL
VAS LEFT ARM BP DIA: 73 MMHG
VAS LEFT ARM BP: 131 MMHG
VAS LEFT CCA DIST EDV: 9.5 CM/S
VAS LEFT CCA DIST PSV: 65.2 CM/S
VAS LEFT CCA PROX EDV: 14.8 CM/S
VAS LEFT CCA PROX PSV: 125 CM/S
VAS LEFT ECA EDV: 6.79 CM/S
VAS LEFT ECA PSV: 86.3 CM/S
VAS LEFT ICA DIST EDV: 25.8 CM/S
VAS LEFT ICA DIST PSV: 89.7 CM/S
VAS LEFT ICA PROX EDV: 18.2 CM/S
VAS LEFT ICA PROX PSV: 33.6 CM/S
VAS LEFT ICA/CCA PSV: 0.52
VAS LEFT SUBCLAVIAN PROX EDV: 0 CM/S
VAS LEFT SUBCLAVIAN PROX PSV: 146 CM/S
VAS LEFT VERTEBRAL EDV: 8.43 CM/S
VAS LEFT VERTEBRAL PSV: 36.8 CM/S
VAS RIGHT ARM BP DIA: 70 MMHG
VAS RIGHT ARM BP: 132 MMHG
VAS RIGHT CCA DIST EDV: 11.4 CM/S
VAS RIGHT CCA DIST PSV: 70.8 CM/S
VAS RIGHT CCA PROX EDV: 6 CM/S
VAS RIGHT CCA PROX PSV: 90.9 CM/S
VAS RIGHT ECA EDV: 12.9 CM/S
VAS RIGHT ECA PSV: 117 CM/S
VAS RIGHT ICA DIST EDV: 20.4 CM/S
VAS RIGHT ICA DIST PSV: 79.2 CM/S
VAS RIGHT ICA PROX EDV: 11 CM/S
VAS RIGHT ICA PROX PSV: 44.2 CM/S
VAS RIGHT ICA/CCA PSV: 0.62
VAS RIGHT SUBCLAVIAN PROX EDV: 0 CM/S
VAS RIGHT SUBCLAVIAN PROX PSV: 101 CM/S
VAS RIGHT VERTEBRAL EDV: 18.6 CM/S
VAS RIGHT VERTEBRAL PSV: 63 CM/S
VLDLC SERPL CALC-MCNC: 23.4 MG/DL
WBC # BLD AUTO: 6.8 K/UL (ref 3.6–11)

## 2025-01-01 PROCEDURE — 99232 SBSQ HOSP IP/OBS MODERATE 35: CPT | Performed by: PSYCHIATRY & NEUROLOGY

## 2025-01-01 PROCEDURE — 6370000000 HC RX 637 (ALT 250 FOR IP): Performed by: FAMILY MEDICINE

## 2025-01-01 PROCEDURE — 80061 LIPID PANEL: CPT

## 2025-01-01 PROCEDURE — 36415 COLL VENOUS BLD VENIPUNCTURE: CPT

## 2025-01-01 PROCEDURE — 6360000002 HC RX W HCPCS: Performed by: PSYCHIATRY & NEUROLOGY

## 2025-01-01 PROCEDURE — 2500000003 HC RX 250 WO HCPCS: Performed by: PSYCHIATRY & NEUROLOGY

## 2025-01-01 PROCEDURE — 2580000003 HC RX 258: Performed by: PSYCHIATRY & NEUROLOGY

## 2025-01-01 PROCEDURE — 85025 COMPLETE CBC W/AUTO DIFF WBC: CPT

## 2025-01-01 PROCEDURE — 83036 HEMOGLOBIN GLYCOSYLATED A1C: CPT

## 2025-01-01 RX ORDER — KETOROLAC TROMETHAMINE 15 MG/ML
15 INJECTION, SOLUTION INTRAMUSCULAR; INTRAVENOUS ONCE
Qty: 1 ML | Refills: 0 | Status: SHIPPED
Start: 2025-01-01 | End: 2025-01-01

## 2025-01-01 RX ORDER — HYDROXYZINE PAMOATE 25 MG/1
25 CAPSULE ORAL 3 TIMES DAILY PRN
Qty: 60 CAPSULE | Refills: 0 | Status: SHIPPED | OUTPATIENT
Start: 2025-01-01 | End: 2025-01-21

## 2025-01-01 RX ORDER — PROMETHAZINE HYDROCHLORIDE 25 MG/1
25 TABLET ORAL 4 TIMES DAILY PRN
Qty: 20 TABLET | Refills: 0 | Status: SHIPPED | OUTPATIENT
Start: 2025-01-01 | End: 2025-01-08

## 2025-01-01 RX ORDER — ASPIRIN 81 MG/1
81 TABLET ORAL DAILY
Qty: 30 TABLET | Refills: 3 | Status: SHIPPED | OUTPATIENT
Start: 2025-01-02

## 2025-01-01 RX ADMIN — PROCHLORPERAZINE EDISYLATE 10 MG: 5 INJECTION INTRAMUSCULAR; INTRAVENOUS at 04:32

## 2025-01-01 RX ADMIN — PANTOPRAZOLE SODIUM 40 MG: 40 TABLET, DELAYED RELEASE ORAL at 07:26

## 2025-01-01 RX ADMIN — FUROSEMIDE 20 MG: 40 TABLET ORAL at 11:23

## 2025-01-01 RX ADMIN — CYANOCOBALAMIN TAB 1000 MCG 5000 MCG: 1000 TAB at 16:30

## 2025-01-01 RX ADMIN — SODIUM CHLORIDE 500 MG: 9 INJECTION, SOLUTION INTRAVENOUS at 04:38

## 2025-01-01 RX ADMIN — ATORVASTATIN CALCIUM 40 MG: 40 TABLET, FILM COATED ORAL at 11:23

## 2025-01-01 RX ADMIN — CETIRIZINE HYDROCHLORIDE 5 MG: 10 TABLET, FILM COATED ORAL at 11:25

## 2025-01-01 RX ADMIN — RIVAROXABAN 10 MG: 10 TABLET, FILM COATED ORAL at 11:23

## 2025-01-01 RX ADMIN — LEVOTHYROXINE SODIUM 75 MCG: 0.03 TABLET ORAL at 07:25

## 2025-01-01 RX ADMIN — KETOROLAC TROMETHAMINE 15 MG: 15 INJECTION, SOLUTION INTRAMUSCULAR; INTRAVENOUS at 04:33

## 2025-01-01 RX ADMIN — DULOXETINE HYDROCHLORIDE 30 MG: 30 CAPSULE, DELAYED RELEASE ORAL at 11:23

## 2025-01-01 ASSESSMENT — PAIN SCALES - GENERAL
PAINLEVEL_OUTOF10: 2
PAINLEVEL_OUTOF10: 3

## 2025-01-01 ASSESSMENT — PAIN DESCRIPTION - LOCATION: LOCATION: GENERALIZED

## 2025-01-01 NOTE — DISCHARGE SUMMARY
Physician Discharge Summary     Patient ID:    Maritza Padron  [id number]  69 y.o.  1955    Admit date: 12/30/2024    Discharge date : 1/1/2025      Final Diagnoses:   Altered mental status, unspecified altered mental status type [R41.82]  Acute cerebrovascular accident (CVA) (HCC) [I63.9]  Chest pain, unspecified type [R07.9]      Reason for Hospitalization:    Headache      Hospital Course:     70 yo WF with prior history of stroke, breast cancer s/p radiation, presented to the ER with 4-5 days of blurred vision and headache. Family noted confusion and word-finding difficulty like she had with her stroke in 2015.   She and her family say that her memory has had increasing memory difficulties.   She describes a headache of bilateral pressure accompanied by photophobia, phonophobia and nausea.   Teleneurology was consulted per teleneurology patient was diagnosed with a status migrainosus.  Status Migrainosus: a migraine lasting more than 24 hours may take more than home regimens can manage to stop it.    The patient may do well with a 24-hour regimen to arrest this migraine attack: IV hydration with a “cocktail” of Depacon 500 mg + Vistaril 25 mg + Toradol, given simultaneously IV Q8 hours x3 (and then may repeat prn).  Other analgesics can be used for breakthrough pain, and a sleep agent may be used at night.  Keep the room quiet and dark - no TV or radio - and limit visitors to two at a time.    If she has more of these attacks, she should talk to the Neurologist she is going to see about problem     >> unable to order IV infusion of Depcon for discharge, patient to follow up with neurology outpatient.       Discharge Medications:      Medication List        START taking these medications      hydrOXYzine pamoate 25 MG capsule  Commonly known as: Vistaril  Take 1 capsule by mouth 3 times daily as needed for Itching     ketorolac 15 MG/ML injection  Commonly known as: TORADOL  Inject 1

## 2025-01-01 NOTE — CARE COORDINATION
CM noted discharge order.     DCP is home with family.    Chronic O2 @ 3 lpm ([facility])     Transition of Care Plan:    RUR: 13  Prior Level of Functioning: INDEP  Disposition: Home  MATT: 1/1/25  If SNF or IPR: Date FOC offered: N/a  Date FOC received: N/a  Accepting facility: N/a  Date authorization started with reference number: N/a  Date authorization received and expires: N/a  Follow up appointments: Per MD  DME needed: N/a  Transportation at discharge: Patient arranged  IM/IMM Medicare/ letter given: 12/31/24  Is patient a [place] and connected with [facility]? N/a   If yes, was  transfer form completed and [facility] notified?   Caregiver Contact: Patient notified  Discharge Caregiver contacted prior to discharge? N/a  Care Conference needed? No  Barriers to discharge: None

## 2025-01-01 NOTE — PROGRESS NOTES
4 Eyes Skin Assessment     NAME:  Maritza Padron  YOB: 1955  MEDICAL RECORD NUMBER:  [id number]    The patient is being assessed for  Shift Handoff    I agree that at least one RN has performed a thorough Head to Toe Skin Assessment on the patient. ALL assessment sites listed below have been assessed.      Areas assessed by both nurses:    Head, Face, Ears, Shoulders, Back, Chest, Arms, Elbows, Hands, Sacrum. Buttock, Coccyx, Ischium, Legs. Feet and Heels, Under Medical Devices        Does the Patient have a Wound? No noted wound(s)       Edson Prevention initiated by RN: No  Wound Care Orders initiated by RN: No    Pressure Injury (Stage 3,4, Unstageable, DTI, NWPT, and Complex wounds) if present, place Wound referral order by RN under : No    New Ostomies, if present place, Ostomy referral order under : No     Nurse 1 eSignature: Electronically signed by KITA DHALIWAL RN on 1/1/25 at 6:54 AM EST    **SHARE this note so that the co-signing nurse can place an eSignature**    Nurse 2 eSignature: Electronically signed by Cathy Springer RN on 1/1/25 at 8:17 AM EST

## 2025-01-01 NOTE — PROGRESS NOTES
NEUROLOGY PROGRESS NOTE  Impression/Recommendations:   68 yo lady admitted with status migrainosus and family mentioned their observations of early memory problems.   Prescribed a Depacon-Toradol-Compazine cocktail for the headache.    Migraine: She can go home. Advised that she keep Excedrin Migraine available for attacks, combining that with pushing fluids. She should have a prescription for Phenergan to control nausea - in the case of migraine, this is preferable to Reglan of Zofran simply because of the dopamine-blocking action of the drug. It can help shorten an attack by blocking certain brainstem reactions that perpetuate an attack. Side effects include sleepiness and at a high enough dose, transient myotonic movements. She should follow with her PCP for more focused treatment options that her PCP can follow with her. If attacks become frequent, then she should see a Neurologist for them.  Memory impairment: Advised that she address this with  a Neurologist. Explained that the earlier a dementia process is treated, the slower it progresses.     SUBJECTIVE  Headache has resolved.   There was an episode overnight when she woke and became anxious and distressed. She is back to her baseline now.       OBJECTIVE   Neuro ROS  No headache, vertigo or vision change.  No difficulties with speech or swallowing.  No numbness, weakness or trouble with gait.      Exam:   Visit Vitals  /67   Pulse 64   Temp 97.3 °F (36.3 °C) (Oral)   Resp 18   Ht 1.727 m (5' 8\")   Wt 111.8 kg (246 lb 7.6 oz)   SpO2 98%   BMI 37.48 kg/m²       Assisted by REVA Pickett RN. Family member at bedside.     Patient was A & O x three. Affect was attentive. Speech was fluent and articulate. Mental status exam was grossly within normal limits.     Cranial nerves:  Pupils were equally reactive. EOMI, no nystagmus. There was no facial numbness or weakness. Hearing was impaired. There was a good shrug. Tongue protruded on the midline.

## 2025-01-01 NOTE — PLAN OF CARE
Problem: Chronic Conditions and Co-morbidities  Goal: Patient's chronic conditions and co-morbidity symptoms are monitored and maintained or improved  Outcome: Progressing     Problem: Discharge Planning  Goal: Discharge to home or other facility with appropriate resources  Outcome: Progressing     Problem: Pain  Goal: Verbalizes/displays adequate comfort level or baseline comfort level  Outcome: Progressing     
  Problem: Discharge Planning  Goal: Discharge to home or other facility with appropriate resources  Outcome: Progressing     
distress in bed, Call bell within reach, Bed/ chair alarm activated, and Caregiver / family present and nsg updated.    COMMUNICATION/EDUCATION:   The patient’s plan of care was discussed with: Registered nurse and Case management    Patient Education  Education Given To: Patient;Family  Education Provided: Role of Therapy;Plan of Care  Education Provided Comments: BEFAST  Education Method: Verbal;Teach Back  Barriers to Learning: None  Education Outcome: Verbalized understanding;Continued education needed        Thank you for this referral.  Starr Dennis, PT  Minutes: 20

## 2025-01-01 NOTE — DISCHARGE INSTRUCTIONS
Status Migrainosus: a migraine lasting more than 24 hours may take more than home regimens can manage to stop it.    The patient may do well with a 24-hour regimen to arrest this migraine attack: IV hydration with a “cocktail” of Depacon 500 mg + Vistaril 25 mg + Toradol, given simultaneously IV Q8 hours x3 (and then may repeat prn).  Other analgesics can be used for breakthrough pain, and a sleep agent may be used at night.  Keep the room quiet and dark - no TV or radio - and limit visitors to two at a time.    If she has more of these attacks, she should talk to the Neurologist she is going to see about problem

## 2025-01-04 RX ORDER — KETOROLAC TROMETHAMINE 10 MG/1
10 TABLET, FILM COATED ORAL EVERY 6 HOURS PRN
Qty: 20 TABLET | Refills: 0 | Status: SHIPPED | OUTPATIENT
Start: 2025-01-04 | End: 2025-01-09

## 2025-01-27 ENCOUNTER — TRANSCRIBE ORDERS (OUTPATIENT)
Facility: HOSPITAL | Age: 70
End: 2025-01-27

## 2025-01-27 DIAGNOSIS — M80.08XA AGE-RELATED OSTEOPOROSIS WITH CURRENT PATHOLOGICAL FRACTURE OF VERTEBRA, INITIAL ENCOUNTER (HCC): Primary | ICD-10-CM

## 2025-02-07 ENCOUNTER — HOSPITAL ENCOUNTER (OUTPATIENT)
Facility: HOSPITAL | Age: 70
Discharge: HOME | End: 2025-02-10
Payer: MEDICARE

## 2025-02-07 DIAGNOSIS — M80.08XA AGE-RELATED OSTEOPOROSIS WITH CURRENT PATHOLOGICAL FRACTURE OF VERTEBRA, INITIAL ENCOUNTER (HCC): ICD-10-CM

## 2025-02-07 PROCEDURE — 77080 DXA BONE DENSITY AXIAL: CPT

## 2025-05-29 ENCOUNTER — HOSPITAL ENCOUNTER (EMERGENCY)
Facility: HOSPITAL | Age: 70
Discharge: HOME | End: 2025-05-29
Attending: EMERGENCY MEDICINE
Payer: MEDICARE

## 2025-05-29 ENCOUNTER — APPOINTMENT (OUTPATIENT)
Facility: HOSPITAL | Age: 70
End: 2025-05-29
Payer: MEDICARE

## 2025-05-29 VITALS
HEART RATE: 59 BPM | OXYGEN SATURATION: 96 % | RESPIRATION RATE: 16 BRPM | HEIGHT: 68 IN | TEMPERATURE: 98.7 F | DIASTOLIC BLOOD PRESSURE: 56 MMHG | BODY MASS INDEX: 35.16 KG/M2 | WEIGHT: 232 LBS | SYSTOLIC BLOOD PRESSURE: 136 MMHG

## 2025-05-29 DIAGNOSIS — J44.9 CHRONIC OBSTRUCTIVE PULMONARY DISEASE WITH HYPOXIA (HCC): Primary | ICD-10-CM

## 2025-05-29 LAB
ALBUMIN SERPL-MCNC: 3.3 G/DL (ref 3.5–5)
ALBUMIN/GLOB SERPL: 1.1 (ref 1.1–2.2)
ALP SERPL-CCNC: 57 U/L (ref 45–117)
ALT SERPL-CCNC: 35 U/L (ref 12–78)
ANION GAP SERPL CALC-SCNC: 7 MMOL/L (ref 2–12)
AST SERPL W P-5'-P-CCNC: 40 U/L (ref 15–37)
BASOPHILS # BLD: 0.04 K/UL (ref 0–0.1)
BASOPHILS NFR BLD: 0.6 % (ref 0–1)
BILIRUB SERPL-MCNC: 0.5 MG/DL (ref 0.2–1)
BNP SERPL-MCNC: 323 PG/ML
BUN SERPL-MCNC: 22 MG/DL (ref 6–20)
BUN/CREAT SERPL: 32 (ref 12–20)
CA-I BLD-MCNC: 9.5 MG/DL (ref 8.5–10.1)
CHLORIDE SERPL-SCNC: 107 MMOL/L (ref 97–108)
CO2 SERPL-SCNC: 27 MMOL/L (ref 21–32)
CREAT SERPL-MCNC: 0.68 MG/DL (ref 0.55–1.02)
DIFFERENTIAL METHOD BLD: NORMAL
EOSINOPHIL # BLD: 0.11 K/UL (ref 0–0.4)
EOSINOPHIL NFR BLD: 1.6 % (ref 0–7)
ERYTHROCYTE [DISTWIDTH] IN BLOOD BY AUTOMATED COUNT: 14.3 % (ref 11.5–14.5)
GLOBULIN SER CALC-MCNC: 3 G/DL (ref 2–4)
GLUCOSE SERPL-MCNC: 92 MG/DL (ref 65–100)
HCT VFR BLD AUTO: 37.8 % (ref 35–47)
HGB BLD-MCNC: 12.6 G/DL (ref 11.5–16)
IMM GRANULOCYTES # BLD AUTO: 0.02 K/UL (ref 0–0.04)
IMM GRANULOCYTES NFR BLD AUTO: 0.3 % (ref 0–0.5)
LYMPHOCYTES # BLD: 1.92 K/UL (ref 0.8–3.5)
LYMPHOCYTES NFR BLD: 27.1 % (ref 12–49)
MAGNESIUM SERPL-MCNC: 1.9 MG/DL (ref 1.6–2.4)
MCH RBC QN AUTO: 29.1 PG (ref 26–34)
MCHC RBC AUTO-ENTMCNC: 33.3 G/DL (ref 30–36.5)
MCV RBC AUTO: 87.3 FL (ref 80–99)
MONOCYTES # BLD: 0.77 K/UL (ref 0–1)
MONOCYTES NFR BLD: 10.9 % (ref 5–13)
NEUTS SEG # BLD: 4.23 K/UL (ref 1.8–8)
NEUTS SEG NFR BLD: 59.5 % (ref 32–75)
NRBC # BLD: 0 K/UL (ref 0–0.01)
NRBC BLD-RTO: 0 PER 100 WBC
PLATELET # BLD AUTO: 170 K/UL (ref 150–400)
PMV BLD AUTO: 11.6 FL (ref 8.9–12.9)
POTASSIUM SERPL-SCNC: 4 MMOL/L (ref 3.5–5.1)
PROT SERPL-MCNC: 6.3 G/DL (ref 6.4–8.2)
RBC # BLD AUTO: 4.33 M/UL (ref 3.8–5.2)
SODIUM SERPL-SCNC: 141 MMOL/L (ref 136–145)
TROPONIN I SERPL HS-MCNC: 18 NG/L (ref 0–51)
WBC # BLD AUTO: 7.1 K/UL (ref 3.6–11)

## 2025-05-29 PROCEDURE — 85025 COMPLETE CBC W/AUTO DIFF WBC: CPT

## 2025-05-29 PROCEDURE — 83880 ASSAY OF NATRIURETIC PEPTIDE: CPT

## 2025-05-29 PROCEDURE — 84484 ASSAY OF TROPONIN QUANT: CPT

## 2025-05-29 PROCEDURE — 93005 ELECTROCARDIOGRAM TRACING: CPT | Performed by: EMERGENCY MEDICINE

## 2025-05-29 PROCEDURE — 80053 COMPREHEN METABOLIC PANEL: CPT

## 2025-05-29 PROCEDURE — 83735 ASSAY OF MAGNESIUM: CPT

## 2025-05-29 PROCEDURE — 36415 COLL VENOUS BLD VENIPUNCTURE: CPT

## 2025-05-29 PROCEDURE — 99285 EMERGENCY DEPT VISIT HI MDM: CPT

## 2025-05-29 PROCEDURE — 71045 X-RAY EXAM CHEST 1 VIEW: CPT

## 2025-05-29 NOTE — ED TRIAGE NOTES
Pt reports being sent to ED by pulmonologist for shortness of breath and low O2 sats. States she went to her pcp this morning and was told to go home and elevate her legs for swelling and to call lung doc if breathing got worse. Pt reports wearing 3L O2 at home, pcp bumped her to 4L today for symptoms.

## 2025-05-29 NOTE — ED PROVIDER NOTES
[facility] EMERGENCY DEPT  EMERGENCY DEPARTMENT HISTORY AND PHYSICAL EXAM      Date of evaluation: 2025  Patient Name: Maritza Padron  Birthdate 1955  MRN: [id number]  ED Provider: Steven Langley MD   Note Started: 2:35 PM EDT 25    HISTORY OF PRESENT ILLNESS     Chief Complaint   Patient presents with    Shortness of Breath       History Provided By: Patient,  sister      HPI: Maritza Padron is a 69 y.o. female presents from primary care doctor's office.  She states that she has become short of breath on exertion lately.  She wears 3 L nasal cannula all times and CPAP at home for COPD.  She takes Xarelto and is compliant.  Brother just passed away from CHF she and her sister at bedside tell me.  She denies any shortness of breath currently cough fevers chest pain.  She is on 4 L currently and oxygen between 9395%.  She has been testing her oxygen with her home meter which I placed on her finger and while she is 93% here the home meter reads tween 86 and 93 and fluctuates.    PAST MEDICAL HISTORY   Past Medical History:  Past Medical History:   Diagnosis Date    Breast cancer (HCC) 2015    Right    Chronic obstructive pulmonary disease (HCC)     Diabetes mellitus (HCC)     Stroke (cerebrum) (HCC)        Past Surgical History:  Past Surgical History:   Procedure Laterality Date    BREAST LUMPECTOMY Right 2015    CARDIAC CATHETERIZATION         Family History:  Family History   Problem Relation Age of Onset    Breast Cancer Sister        Social History:  Social History     Tobacco Use    Smoking status: Former     Current packs/day: 0.00     Types: Cigarettes     Quit date: 2016     Years since quittin.4    Smokeless tobacco: Never   Substance Use Topics    Alcohol use: Not Currently    Drug use: Never       Allergies:  Allergies   Allergen Reactions    Codeine      Other reaction(s): Unknown (comments)       PCP: Judah Salgado DO    Current Meds:   No current

## 2025-05-29 NOTE — DISCHARGE INSTRUCTIONS
.    Thank you for choosing our Emergency Department for your care.  It is our privilege to care for you in your time of need.  In the next several days, you may receive a survey via email or mailed to your home about your experience with our team.  We would greatly appreciate you taking a few minutes to complete the survey, as we use this information to learn what we have done well and what we could be doing better. Thank you for trusting us with your care!    Below you will find a list of your tests from today's visit.   Labs and Radiology Studies  Recent Results (from the past 12 hours)   CBC with Auto Differential    Collection Time: 05/29/25  3:07 PM   Result Value Ref Range    WBC 7.1 3.6 - 11.0 K/uL    RBC 4.33 3.80 - 5.20 M/uL    Hemoglobin 12.6 11.5 - 16.0 g/dL    Hematocrit 37.8 35.0 - 47.0 %    MCV 87.3 80.0 - 99.0 FL    MCH 29.1 26.0 - 34.0 PG    MCHC 33.3 30.0 - 36.5 g/dL    RDW 14.3 11.5 - 14.5 %    Platelets 170 150 - 400 K/uL    MPV 11.6 8.9 - 12.9 FL    Nucleated RBCs 0.0 0.0  WBC    nRBC 0.00 0.00 - 0.01 K/uL    Neutrophils % 59.5 32.0 - 75.0 %    Lymphocytes % 27.1 12.0 - 49.0 %    Monocytes % 10.9 5.0 - 13.0 %    Eosinophils % 1.6 0.0 - 7.0 %    Basophils % 0.6 0.0 - 1.0 %    Immature Granulocytes % 0.3 0 - 0.5 %    Neutrophils Absolute 4.23 1.80 - 8.00 K/UL    Lymphocytes Absolute 1.92 0.80 - 3.50 K/UL    Monocytes Absolute 0.77 0.00 - 1.00 K/UL    Eosinophils Absolute 0.11 0.00 - 0.40 K/UL    Basophils Absolute 0.04 0.00 - 0.10 K/UL    Immature Granulocytes Absolute 0.02 0.00 - 0.04 K/UL    Differential Type AUTOMATED     Comprehensive Metabolic Panel    Collection Time: 05/29/25  3:07 PM   Result Value Ref Range    Sodium 141 136 - 145 mmol/L    Potassium 4.0 3.5 - 5.1 mmol/L    Chloride 107 97 - 108 mmol/L    CO2 27 21 - 32 mmol/L    Anion Gap 7 2 - 12 mmol/L    Glucose 92 65 - 100 mg/dL    BUN 22 (H) 6 - 20 mg/dL    Creatinine 0.68 0.55 - 1.02 mg/dL    BUN/Creatinine Ratio 32 (H) 12 -

## 2025-05-30 LAB
EKG ATRIAL RATE: 63 BPM
EKG DIAGNOSIS: NORMAL
EKG P AXIS: 21 DEGREES
EKG P-R INTERVAL: 164 MS
EKG Q-T INTERVAL: 462 MS
EKG QRS DURATION: 142 MS
EKG QTC CALCULATION (BAZETT): 472 MS
EKG R AXIS: -58 DEGREES
EKG T AXIS: 3 DEGREES
EKG VENTRICULAR RATE: 63 BPM